# Patient Record
Sex: MALE | Race: WHITE | NOT HISPANIC OR LATINO
[De-identification: names, ages, dates, MRNs, and addresses within clinical notes are randomized per-mention and may not be internally consistent; named-entity substitution may affect disease eponyms.]

---

## 2017-01-11 ENCOUNTER — APPOINTMENT (OUTPATIENT)
Dept: BARIATRICS/WEIGHT MGMT | Facility: CLINIC | Age: 72
End: 2017-01-11

## 2017-01-11 VITALS
DIASTOLIC BLOOD PRESSURE: 70 MMHG | HEIGHT: 69 IN | SYSTOLIC BLOOD PRESSURE: 142 MMHG | BODY MASS INDEX: 33.51 KG/M2 | WEIGHT: 226.25 LBS

## 2017-01-11 VITALS — WEIGHT: 226.25 LBS | BODY MASS INDEX: 33.41 KG/M2

## 2017-01-11 RX ORDER — MECLIZINE HYDROCHLORIDE 25 MG/1
25 TABLET ORAL
Qty: 15 | Refills: 0 | Status: DISCONTINUED | COMMUNITY
Start: 2016-08-26

## 2017-01-11 RX ORDER — DEXAMETHASONE 4 MG/1
4 TABLET ORAL
Qty: 4 | Refills: 0 | Status: DISCONTINUED | COMMUNITY
Start: 2016-12-07

## 2017-01-11 RX ORDER — AMOXICILLIN 250 MG/1
250 CAPSULE ORAL
Qty: 40 | Refills: 0 | Status: DISCONTINUED | COMMUNITY
Start: 2016-12-07

## 2017-01-11 RX ORDER — CHLORHEXIDINE GLUCONATE, 0.12% ORAL RINSE 1.2 MG/ML
0.12 SOLUTION DENTAL
Qty: 473 | Refills: 0 | Status: DISCONTINUED | COMMUNITY
Start: 2016-12-07

## 2017-01-11 RX ORDER — COLCHICINE 0.6 MG/1
0.6 TABLET ORAL
Qty: 10 | Refills: 0 | Status: DISCONTINUED | COMMUNITY
Start: 2017-01-10

## 2017-01-11 RX ORDER — CLINDAMYCIN HYDROCHLORIDE 300 MG/1
300 CAPSULE ORAL
Qty: 14 | Refills: 0 | Status: DISCONTINUED | COMMUNITY
Start: 2016-12-12

## 2017-02-13 ENCOUNTER — APPOINTMENT (OUTPATIENT)
Dept: BARIATRICS/WEIGHT MGMT | Facility: CLINIC | Age: 72
End: 2017-02-13

## 2017-02-13 VITALS
HEIGHT: 69 IN | SYSTOLIC BLOOD PRESSURE: 117 MMHG | WEIGHT: 223.06 LBS | HEART RATE: 75 BPM | BODY MASS INDEX: 33.04 KG/M2 | DIASTOLIC BLOOD PRESSURE: 65 MMHG

## 2017-02-13 DIAGNOSIS — R26.9 UNSPECIFIED ABNORMALITIES OF GAIT AND MOBILITY: ICD-10-CM

## 2017-02-13 DIAGNOSIS — E55.9 VITAMIN D DEFICIENCY, UNSPECIFIED: ICD-10-CM

## 2017-04-02 LAB
25(OH)D3 SERPL-MCNC: 46.8 NG/ML
ALBUMIN SERPL ELPH-MCNC: 4.5 G/DL
ALP BLD-CCNC: 64 U/L
ALT SERPL-CCNC: 21 U/L
ANION GAP SERPL CALC-SCNC: 14 MMOL/L
AST SERPL-CCNC: 22 U/L
BASOPHILS # BLD AUTO: 0.02 K/UL
BASOPHILS NFR BLD AUTO: 0.3 %
BILIRUB SERPL-MCNC: 0.5 MG/DL
BUN SERPL-MCNC: 31 MG/DL
CALCIUM SERPL-MCNC: 9.9 MG/DL
CHLORIDE SERPL-SCNC: 104 MMOL/L
CHOLEST SERPL-MCNC: 131 MG/DL
CHOLEST/HDLC SERPL: 2.6 RATIO
CO2 SERPL-SCNC: 23 MMOL/L
CREAT SERPL-MCNC: 1.32 MG/DL
CRP SERPL HS-MCNC: 5.9 MG/L
EOSINOPHIL # BLD AUTO: 0.11 K/UL
EOSINOPHIL NFR BLD AUTO: 1.4 %
GLUCOSE SERPL-MCNC: 90 MG/DL
HBA1C MFR BLD HPLC: 5.6 %
HCT VFR BLD CALC: 39.6 %
HDLC SERPL-MCNC: 50 MG/DL
HGB BLD-MCNC: 12.8 G/DL
IMM GRANULOCYTES NFR BLD AUTO: 0.1 %
INSULIN SERPL-MCNC: 10.9 UU/ML
LDLC SERPL CALC-MCNC: 53 MG/DL
LYMPHOCYTES # BLD AUTO: 2.18 K/UL
LYMPHOCYTES NFR BLD AUTO: 28.7 %
MAN DIFF?: NORMAL
MCHC RBC-ENTMCNC: 28.8 PG
MCHC RBC-ENTMCNC: 32.3 GM/DL
MCV RBC AUTO: 89 FL
MONOCYTES # BLD AUTO: 0.71 K/UL
MONOCYTES NFR BLD AUTO: 9.4 %
NEUTROPHILS # BLD AUTO: 4.56 K/UL
NEUTROPHILS NFR BLD AUTO: 60.1 %
PLATELET # BLD AUTO: 200 K/UL
POTASSIUM SERPL-SCNC: 4.7 MMOL/L
PROT SERPL-MCNC: 7.3 G/DL
RBC # BLD: 4.45 M/UL
RBC # FLD: 13.7 %
SODIUM SERPL-SCNC: 141 MMOL/L
T3FREE SERPL-MCNC: 2.52 PG/ML
T4 FREE SERPL-MCNC: 1.1 NG/DL
TRIGL SERPL-MCNC: 140 MG/DL
TSH SERPL-ACNC: 3.39 UIU/ML
WBC # FLD AUTO: 7.59 K/UL

## 2017-04-10 ENCOUNTER — APPOINTMENT (OUTPATIENT)
Dept: BARIATRICS/WEIGHT MGMT | Facility: CLINIC | Age: 72
End: 2017-04-10

## 2017-04-10 VITALS
BODY MASS INDEX: 33.56 KG/M2 | SYSTOLIC BLOOD PRESSURE: 137 MMHG | WEIGHT: 226.6 LBS | DIASTOLIC BLOOD PRESSURE: 66 MMHG | HEIGHT: 69 IN | HEART RATE: 83 BPM

## 2017-05-22 ENCOUNTER — APPOINTMENT (OUTPATIENT)
Dept: BARIATRICS/WEIGHT MGMT | Facility: CLINIC | Age: 72
End: 2017-05-22

## 2017-05-22 VITALS
WEIGHT: 229 LBS | HEIGHT: 69 IN | HEART RATE: 92 BPM | SYSTOLIC BLOOD PRESSURE: 162 MMHG | DIASTOLIC BLOOD PRESSURE: 76 MMHG | BODY MASS INDEX: 33.92 KG/M2

## 2017-08-28 ENCOUNTER — APPOINTMENT (OUTPATIENT)
Dept: BARIATRICS/WEIGHT MGMT | Facility: CLINIC | Age: 72
End: 2017-08-28

## 2018-03-12 ENCOUNTER — APPOINTMENT (OUTPATIENT)
Dept: BARIATRICS/WEIGHT MGMT | Facility: CLINIC | Age: 73
End: 2018-03-12
Payer: MEDICARE

## 2018-03-12 VITALS — SYSTOLIC BLOOD PRESSURE: 164 MMHG | DIASTOLIC BLOOD PRESSURE: 84 MMHG

## 2018-03-12 VITALS
WEIGHT: 257 LBS | BODY MASS INDEX: 38.06 KG/M2 | OXYGEN SATURATION: 96 % | DIASTOLIC BLOOD PRESSURE: 78 MMHG | SYSTOLIC BLOOD PRESSURE: 156 MMHG | HEART RATE: 81 BPM | HEIGHT: 69 IN

## 2018-03-12 PROCEDURE — 99214 OFFICE O/P EST MOD 30 MIN: CPT

## 2018-03-12 RX ORDER — GABAPENTIN 100 MG/1
100 CAPSULE ORAL
Qty: 120 | Refills: 0 | Status: DISCONTINUED | COMMUNITY
Start: 2016-12-14 | End: 2018-03-12

## 2018-04-16 ENCOUNTER — APPOINTMENT (OUTPATIENT)
Dept: BARIATRICS/WEIGHT MGMT | Facility: CLINIC | Age: 73
End: 2018-04-16
Payer: MEDICARE

## 2018-04-16 VITALS
BODY MASS INDEX: 37.77 KG/M2 | DIASTOLIC BLOOD PRESSURE: 78 MMHG | SYSTOLIC BLOOD PRESSURE: 150 MMHG | WEIGHT: 255 LBS | HEIGHT: 69 IN

## 2018-04-16 PROCEDURE — 99214 OFFICE O/P EST MOD 30 MIN: CPT

## 2018-06-04 ENCOUNTER — APPOINTMENT (OUTPATIENT)
Dept: BARIATRICS/WEIGHT MGMT | Facility: CLINIC | Age: 73
End: 2018-06-04
Payer: MEDICARE

## 2018-06-04 VITALS
WEIGHT: 254 LBS | BODY MASS INDEX: 37.62 KG/M2 | HEIGHT: 69 IN | SYSTOLIC BLOOD PRESSURE: 146 MMHG | HEART RATE: 91 BPM | DIASTOLIC BLOOD PRESSURE: 88 MMHG | OXYGEN SATURATION: 96 %

## 2018-06-04 PROCEDURE — 99214 OFFICE O/P EST MOD 30 MIN: CPT

## 2018-07-09 ENCOUNTER — APPOINTMENT (OUTPATIENT)
Dept: BARIATRICS/WEIGHT MGMT | Facility: CLINIC | Age: 73
End: 2018-07-09
Payer: MEDICARE

## 2018-07-09 VITALS
SYSTOLIC BLOOD PRESSURE: 130 MMHG | DIASTOLIC BLOOD PRESSURE: 80 MMHG | WEIGHT: 247 LBS | BODY MASS INDEX: 36.58 KG/M2 | HEIGHT: 69 IN

## 2018-07-09 PROCEDURE — 99214 OFFICE O/P EST MOD 30 MIN: CPT

## 2018-07-09 RX ORDER — MULTIVITAMIN,THERAPEUTIC
TABLET ORAL
Refills: 0 | Status: ACTIVE | COMMUNITY

## 2018-08-20 ENCOUNTER — APPOINTMENT (OUTPATIENT)
Dept: BARIATRICS/WEIGHT MGMT | Facility: CLINIC | Age: 73
End: 2018-08-20
Payer: MEDICARE

## 2018-08-20 VITALS
DIASTOLIC BLOOD PRESSURE: 78 MMHG | BODY MASS INDEX: 37.38 KG/M2 | SYSTOLIC BLOOD PRESSURE: 148 MMHG | HEIGHT: 69 IN | WEIGHT: 252.4 LBS | HEART RATE: 95 BPM | OXYGEN SATURATION: 96 %

## 2018-08-20 PROCEDURE — 99214 OFFICE O/P EST MOD 30 MIN: CPT

## 2018-09-17 ENCOUNTER — APPOINTMENT (OUTPATIENT)
Dept: BARIATRICS/WEIGHT MGMT | Facility: CLINIC | Age: 73
End: 2018-09-17
Payer: MEDICARE

## 2018-09-17 VITALS
HEART RATE: 81 BPM | BODY MASS INDEX: 36.79 KG/M2 | WEIGHT: 248.38 LBS | OXYGEN SATURATION: 97 % | HEIGHT: 69 IN | DIASTOLIC BLOOD PRESSURE: 80 MMHG | SYSTOLIC BLOOD PRESSURE: 150 MMHG

## 2018-09-17 PROCEDURE — 99214 OFFICE O/P EST MOD 30 MIN: CPT

## 2018-10-17 ENCOUNTER — APPOINTMENT (OUTPATIENT)
Dept: BARIATRICS/WEIGHT MGMT | Facility: CLINIC | Age: 73
End: 2018-10-17

## 2018-11-19 ENCOUNTER — APPOINTMENT (OUTPATIENT)
Dept: BARIATRICS/WEIGHT MGMT | Facility: CLINIC | Age: 73
End: 2018-11-19
Payer: MEDICARE

## 2018-11-19 VITALS
HEART RATE: 102 BPM | DIASTOLIC BLOOD PRESSURE: 86 MMHG | BODY MASS INDEX: 37.89 KG/M2 | OXYGEN SATURATION: 97 % | HEIGHT: 69 IN | SYSTOLIC BLOOD PRESSURE: 156 MMHG | WEIGHT: 255.8 LBS

## 2018-11-19 PROCEDURE — 99214 OFFICE O/P EST MOD 30 MIN: CPT

## 2018-11-26 ENCOUNTER — APPOINTMENT (OUTPATIENT)
Dept: ORTHOPEDIC SURGERY | Facility: CLINIC | Age: 73
End: 2018-11-26
Payer: MEDICARE

## 2018-11-26 VITALS
DIASTOLIC BLOOD PRESSURE: 91 MMHG | HEART RATE: 76 BPM | SYSTOLIC BLOOD PRESSURE: 185 MMHG | WEIGHT: 255 LBS | BODY MASS INDEX: 37.77 KG/M2 | HEIGHT: 69 IN

## 2018-11-26 DIAGNOSIS — Z78.9 OTHER SPECIFIED HEALTH STATUS: ICD-10-CM

## 2018-11-26 DIAGNOSIS — M47.814 SPONDYLOSIS W/OUT MYELOPATHY OR RADICULOPATHY, THORACIC REGION: ICD-10-CM

## 2018-11-26 PROCEDURE — 72070 X-RAY EXAM THORAC SPINE 2VWS: CPT

## 2018-11-26 PROCEDURE — 72100 X-RAY EXAM L-S SPINE 2/3 VWS: CPT

## 2018-11-26 PROCEDURE — 99204 OFFICE O/P NEW MOD 45 MIN: CPT

## 2018-11-26 RX ORDER — CHOLECALCIFEROL (VITAMIN D3) 25 MCG
TABLET ORAL
Refills: 0 | Status: ACTIVE | COMMUNITY

## 2019-01-14 ENCOUNTER — APPOINTMENT (OUTPATIENT)
Dept: BARIATRICS/WEIGHT MGMT | Facility: CLINIC | Age: 74
End: 2019-01-14
Payer: MEDICARE

## 2019-01-14 VITALS
WEIGHT: 256 LBS | BODY MASS INDEX: 37.92 KG/M2 | DIASTOLIC BLOOD PRESSURE: 64 MMHG | SYSTOLIC BLOOD PRESSURE: 130 MMHG | HEIGHT: 69 IN

## 2019-01-14 PROCEDURE — 99214 OFFICE O/P EST MOD 30 MIN: CPT

## 2019-01-14 NOTE — ASSESSMENT
[FreeTextEntry1] : 72 yo M presents for weight management f/u.  No weight loss over numerous visits.  Pt is a candidate for pharmacotherapy.\par \par Plan:\par Initiate GLP-1 agonist pending insurance coverage\par Avoid all after dinner snacking\par Activity as tolerated having chronic pain.\par Discussed need for pt to be mindful of meal choices during upcoming vacations\par Will need to discuss other options for sleep apnea management as pt prefers to avoid CPAP at this time\par \par RTC 4wks

## 2019-01-14 NOTE — PHYSICAL EXAM
[Obese] : obese [Normal] : RRR, normal S1S2, no murmurs, rubs, gallops [de-identified] : large neck circumference [de-identified] : central adiposity

## 2019-01-14 NOTE — HISTORY OF PRESENT ILLNESS
[FreeTextEntry1] : 72 yo M presents for weight management f/u.\par \par Weight up an additional pound from his last visit, 33lb from his lowest weight.\par \par Pt w/ inconsistencies sticking w/ meal plan.  He normally has eggs w/ coffee for breakfast, skips lunch and fish w/ soup for dinner.  He's attempting to limit bread, occasional pretzels as an after dinner snack.  12 hr meal window.\par No activity due to pain.  He attempted topical CBD which provided some relief for walking.\par Pt has sleep apnea, refusing CPAP.  Gets about 8hr sleep nightly.

## 2019-02-20 ENCOUNTER — APPOINTMENT (OUTPATIENT)
Dept: BARIATRICS/WEIGHT MGMT | Facility: CLINIC | Age: 74
End: 2019-02-20
Payer: MEDICARE

## 2019-02-20 VITALS
SYSTOLIC BLOOD PRESSURE: 120 MMHG | OXYGEN SATURATION: 94 % | HEIGHT: 69 IN | WEIGHT: 259 LBS | DIASTOLIC BLOOD PRESSURE: 80 MMHG | HEART RATE: 82 BPM | BODY MASS INDEX: 38.36 KG/M2

## 2019-02-20 PROCEDURE — 99214 OFFICE O/P EST MOD 30 MIN: CPT

## 2019-02-20 RX ORDER — TRAMADOL HYDROCHLORIDE 50 MG/1
50 TABLET, COATED ORAL
Refills: 0 | Status: COMPLETED | COMMUNITY
End: 2019-02-20

## 2019-02-20 RX ORDER — PREDNISONE 10 MG/1
10 TABLET ORAL
Qty: 39 | Refills: 0 | Status: COMPLETED | COMMUNITY
Start: 2018-11-26 | End: 2019-02-20

## 2019-02-20 NOTE — REASON FOR VISIT
[Follow-Up Visit] : a follow-up visit for [Obesity] : obesity [Metabolic Syndrome] : metabolic syndrome

## 2019-02-23 ENCOUNTER — RX RENEWAL (OUTPATIENT)
Age: 74
End: 2019-02-23

## 2019-02-25 ENCOUNTER — APPOINTMENT (OUTPATIENT)
Dept: INFECTIOUS DISEASE | Facility: CLINIC | Age: 74
End: 2019-02-25

## 2019-02-26 NOTE — HISTORY OF PRESENT ILLNESS
[FreeTextEntry1] : 74 yo M presents for weight management f/u.\par \par 3lb increase since last visit, up 36lb from his lowest weight.\par Recently returned from vacation, scheduled for another vacation in 2wks.  He ate more meals, pasta and higher fat meals when away.\par Unable to contact pt to start Trulicity, will discuss today.\par No activity due to pain.  He attempted topical CBD which provided some relief for walking.\par Pt has sleep apnea, refusing CPAP.

## 2019-02-26 NOTE — ASSESSMENT
[FreeTextEntry1] : 72 yo M presents for weight management f/u.  No weight loss over numerous visits.  Pt is a candidate for pharmacotherapy.\par \par Plan:\par Will start Trulicity.  Discussed side effects and storage requirements.\par Discussed need for pt to be mindful of meal choices during upcoming vacations\par Activity as tolerated having chronic pain.\par Will need to discuss other options for sleep apnea management as pt prefers to avoid CPAP\par \par RTC 4wks

## 2019-02-26 NOTE — PHYSICAL EXAM
[Obese] : obese [Normal] : RRR, normal S1S2, no murmurs, rubs, gallops [de-identified] : large neck circumference [de-identified] : central adiposity

## 2019-03-07 ENCOUNTER — MEDICATION RENEWAL (OUTPATIENT)
Age: 74
End: 2019-03-07

## 2019-04-10 ENCOUNTER — APPOINTMENT (OUTPATIENT)
Dept: BARIATRICS/WEIGHT MGMT | Facility: CLINIC | Age: 74
End: 2019-04-10
Payer: MEDICARE

## 2019-04-10 VITALS
SYSTOLIC BLOOD PRESSURE: 170 MMHG | BODY MASS INDEX: 37.59 KG/M2 | WEIGHT: 248 LBS | DIASTOLIC BLOOD PRESSURE: 80 MMHG | HEART RATE: 90 BPM | HEIGHT: 68 IN | OXYGEN SATURATION: 95 %

## 2019-04-10 PROCEDURE — 99214 OFFICE O/P EST MOD 30 MIN: CPT

## 2019-04-19 DIAGNOSIS — Z87.09 PERSONAL HISTORY OF OTHER DISEASES OF THE RESPIRATORY SYSTEM: ICD-10-CM

## 2019-04-19 NOTE — HISTORY OF PRESENT ILLNESS
[FreeTextEntry1] : 74 yo M presents for weight management f/u.\par Pt w/ 11lb weight loss since last visit.\par \par Pt tolerating Trulicity 0.75mg denying reflux.  Slight decrease in appetite.\par Pt also attempts to eat a small snack like pretzels prior to meals to decrease appetite \par He also did more walking as tolerated when away on vacation, limited by pain.\par Pt has sleep apnea, refusing CPAP.

## 2019-04-19 NOTE — ASSESSMENT
[FreeTextEntry1] : 74 yo M presents for weight management f/u.\par Pt w/ 11lb weight loss since last visit.\par \par Plan:\par Suggest pt focus on plant based options high in fiber options for meals avoiding refined carbohydrates for snacks ie pretzels\par Encouraged low impact activities ie stationary bike, water aerobics\par Increase dose of Trulicity 0.75-1.5mg\par \par \par RTC 4wks

## 2019-04-19 NOTE — PHYSICAL EXAM
[Obese] : obese [Normal] : affect appropriate [de-identified] : central adiposity [de-identified] : walks w/ limp, chronic [de-identified] : large neck circumference

## 2019-04-29 ENCOUNTER — FORM ENCOUNTER (OUTPATIENT)
Age: 74
End: 2019-04-29

## 2019-04-30 ENCOUNTER — APPOINTMENT (OUTPATIENT)
Dept: FAMILY MEDICINE | Facility: CLINIC | Age: 74
End: 2019-04-30
Payer: MEDICARE

## 2019-04-30 ENCOUNTER — OUTPATIENT (OUTPATIENT)
Dept: OUTPATIENT SERVICES | Facility: HOSPITAL | Age: 74
LOS: 1 days | End: 2019-04-30
Payer: MEDICARE

## 2019-04-30 ENCOUNTER — APPOINTMENT (OUTPATIENT)
Dept: RADIOLOGY | Facility: HOSPITAL | Age: 74
End: 2019-04-30

## 2019-04-30 VITALS
OXYGEN SATURATION: 95 % | DIASTOLIC BLOOD PRESSURE: 78 MMHG | HEIGHT: 68 IN | RESPIRATION RATE: 14 BRPM | TEMPERATURE: 98.4 F | HEART RATE: 97 BPM | SYSTOLIC BLOOD PRESSURE: 142 MMHG | BODY MASS INDEX: 38.8 KG/M2 | WEIGHT: 256 LBS

## 2019-04-30 DIAGNOSIS — R05 COUGH: ICD-10-CM

## 2019-04-30 DIAGNOSIS — M48.00 SPINAL STENOSIS, SITE UNSPECIFIED: ICD-10-CM

## 2019-04-30 PROCEDURE — 71046 X-RAY EXAM CHEST 2 VIEWS: CPT | Mod: 26

## 2019-04-30 PROCEDURE — 71046 X-RAY EXAM CHEST 2 VIEWS: CPT

## 2019-04-30 PROCEDURE — 99203 OFFICE O/P NEW LOW 30 MIN: CPT

## 2019-04-30 NOTE — HEALTH RISK ASSESSMENT
[] : Yes [No falls in past year] : Patient reported no falls in the past year [0] : 2) Feeling down, depressed, or hopeless: Not at all (0) [de-identified] : social

## 2019-04-30 NOTE — PHYSICAL EXAM
[No Acute Distress] : no acute distress [Well Nourished] : well nourished [Normal Sclera/Conjunctiva] : normal sclera/conjunctiva [Normal Outer Ear/Nose] : the outer ears and nose were normal in appearance [Normal Oropharynx] : the oropharynx was normal [No JVD] : no jugular venous distention [Supple] : supple [No Lymphadenopathy] : no lymphadenopathy [Thyroid Normal, No Nodules] : the thyroid was normal and there were no nodules present [No Respiratory Distress] : no respiratory distress  [Clear to Auscultation] : lungs were clear to auscultation bilaterally [No Accessory Muscle Use] : no accessory muscle use [Normal Rate] : normal rate  [Regular Rhythm] : with a regular rhythm [Normal S1, S2] : normal S1 and S2 [No Murmur] : no murmur heard [No Edema] : there was no peripheral edema [Soft] : abdomen soft [Non Tender] : non-tender [Non-distended] : non-distended [No HSM] : no HSM [Normal Bowel Sounds] : normal bowel sounds [Normal Supraclavicular Nodes] : no supraclavicular lymphadenopathy [Normal Posterior Cervical Nodes] : no posterior cervical lymphadenopathy [Normal Anterior Cervical Nodes] : no anterior cervical lymphadenopathy [No CVA Tenderness] : no CVA  tenderness [de-identified] : morbid obesity

## 2019-04-30 NOTE — REVIEW OF SYSTEMS
[Fever] : no fever [Chills] : no chills [Night Sweats] : no night sweats [Shortness Of Breath] : no shortness of breath [Wheezing] : no wheezing [Cough] : cough [Dyspnea on Exertion] : not dyspnea on exertion [Joint Pain] : joint pain [Joint Stiffness] : joint stiffness [Back Pain] : back pain [Negative] : Genitourinary

## 2019-05-01 RX ORDER — CELECOXIB 50 MG/1
CAPSULE ORAL
Refills: 0 | Status: DISCONTINUED | COMMUNITY
End: 2019-05-01

## 2019-05-01 RX ORDER — DULAGLUTIDE 0.75 MG/.5ML
0.75 INJECTION, SOLUTION SUBCUTANEOUS
Qty: 4 | Refills: 3 | Status: DISCONTINUED | COMMUNITY
Start: 2019-02-20 | End: 2019-05-01

## 2019-05-01 RX ORDER — TRAMADOL HYDROCHLORIDE 50 MG/1
50 TABLET, COATED ORAL
Qty: 30 | Refills: 0 | Status: DISCONTINUED | COMMUNITY
Start: 2018-11-09 | End: 2019-05-01

## 2019-05-01 RX ORDER — POTASSIUM CHLORIDE 10 MEQ
CAPSULE, EXTENDED RELEASE ORAL
Refills: 0 | Status: DISCONTINUED | COMMUNITY
End: 2019-05-01

## 2019-05-01 RX ORDER — CELECOXIB 200 MG/1
200 CAPSULE ORAL TWICE DAILY
Qty: 60 | Refills: 2 | Status: DISCONTINUED | COMMUNITY
Start: 2018-11-26 | End: 2019-05-01

## 2019-05-22 ENCOUNTER — APPOINTMENT (OUTPATIENT)
Dept: BARIATRICS/WEIGHT MGMT | Facility: CLINIC | Age: 74
End: 2019-05-22
Payer: MEDICARE

## 2019-05-22 VITALS
OXYGEN SATURATION: 96 % | HEART RATE: 94 BPM | HEIGHT: 68 IN | DIASTOLIC BLOOD PRESSURE: 80 MMHG | WEIGHT: 246 LBS | BODY MASS INDEX: 37.28 KG/M2 | SYSTOLIC BLOOD PRESSURE: 162 MMHG

## 2019-05-22 PROCEDURE — 99214 OFFICE O/P EST MOD 30 MIN: CPT

## 2019-05-23 ENCOUNTER — EMERGENCY (EMERGENCY)
Facility: HOSPITAL | Age: 74
LOS: 0 days | Discharge: HOME | End: 2019-05-23
Attending: EMERGENCY MEDICINE | Admitting: EMERGENCY MEDICINE
Payer: MEDICARE

## 2019-05-23 VITALS
TEMPERATURE: 98 F | RESPIRATION RATE: 18 BRPM | DIASTOLIC BLOOD PRESSURE: 66 MMHG | HEART RATE: 75 BPM | SYSTOLIC BLOOD PRESSURE: 156 MMHG | OXYGEN SATURATION: 96 %

## 2019-05-23 VITALS — HEIGHT: 68 IN | WEIGHT: 246.04 LBS

## 2019-05-23 DIAGNOSIS — J02.9 ACUTE PHARYNGITIS, UNSPECIFIED: ICD-10-CM

## 2019-05-23 DIAGNOSIS — R05 COUGH: ICD-10-CM

## 2019-05-23 DIAGNOSIS — Z87.442 PERSONAL HISTORY OF URINARY CALCULI: ICD-10-CM

## 2019-05-23 DIAGNOSIS — E78.5 HYPERLIPIDEMIA, UNSPECIFIED: ICD-10-CM

## 2019-05-23 DIAGNOSIS — J98.11 ATELECTASIS: ICD-10-CM

## 2019-05-23 DIAGNOSIS — I10 ESSENTIAL (PRIMARY) HYPERTENSION: ICD-10-CM

## 2019-05-23 DIAGNOSIS — R61 GENERALIZED HYPERHIDROSIS: ICD-10-CM

## 2019-05-23 DIAGNOSIS — Z85.51 PERSONAL HISTORY OF MALIGNANT NEOPLASM OF BLADDER: ICD-10-CM

## 2019-05-23 DIAGNOSIS — R06.02 SHORTNESS OF BREATH: ICD-10-CM

## 2019-05-23 LAB
ALBUMIN SERPL ELPH-MCNC: 3.6 G/DL — SIGNIFICANT CHANGE UP (ref 3.3–5)
ALP SERPL-CCNC: 67 U/L — SIGNIFICANT CHANGE UP (ref 40–120)
ALT FLD-CCNC: 29 U/L — SIGNIFICANT CHANGE UP (ref 12–78)
ANION GAP SERPL CALC-SCNC: 7 MMOL/L — SIGNIFICANT CHANGE UP (ref 5–17)
APTT BLD: 29.1 SEC — SIGNIFICANT CHANGE UP (ref 27.5–36.3)
AST SERPL-CCNC: 21 U/L — SIGNIFICANT CHANGE UP (ref 15–37)
BASOPHILS # BLD AUTO: 0.02 K/UL — SIGNIFICANT CHANGE UP (ref 0–0.2)
BASOPHILS NFR BLD AUTO: 0.3 % — SIGNIFICANT CHANGE UP (ref 0–2)
BILIRUB SERPL-MCNC: 0.4 MG/DL — SIGNIFICANT CHANGE UP (ref 0.2–1.2)
BLD GP AB SCN SERPL QL: SIGNIFICANT CHANGE UP
BUN SERPL-MCNC: 20 MG/DL — SIGNIFICANT CHANGE UP (ref 7–23)
CALCIUM SERPL-MCNC: 9.2 MG/DL — SIGNIFICANT CHANGE UP (ref 8.5–10.1)
CHLORIDE SERPL-SCNC: 109 MMOL/L — HIGH (ref 96–108)
CK SERPL-CCNC: 158 U/L — SIGNIFICANT CHANGE UP (ref 26–308)
CO2 SERPL-SCNC: 25 MMOL/L — SIGNIFICANT CHANGE UP (ref 22–31)
CREAT SERPL-MCNC: 1.35 MG/DL — HIGH (ref 0.5–1.3)
EOSINOPHIL # BLD AUTO: 0.24 K/UL — SIGNIFICANT CHANGE UP (ref 0–0.5)
EOSINOPHIL NFR BLD AUTO: 3.6 % — SIGNIFICANT CHANGE UP (ref 0–6)
GLUCOSE SERPL-MCNC: 86 MG/DL — SIGNIFICANT CHANGE UP (ref 70–99)
HCT VFR BLD CALC: 37.3 % — LOW (ref 39–50)
HGB BLD-MCNC: 12.1 G/DL — LOW (ref 13–17)
IMM GRANULOCYTES NFR BLD AUTO: 0.3 % — SIGNIFICANT CHANGE UP (ref 0–1.5)
INR BLD: 0.99 RATIO — SIGNIFICANT CHANGE UP (ref 0.88–1.16)
LACTATE SERPL-SCNC: 1.3 MMOL/L — SIGNIFICANT CHANGE UP (ref 0.7–2)
LYMPHOCYTES # BLD AUTO: 1.59 K/UL — SIGNIFICANT CHANGE UP (ref 1–3.3)
LYMPHOCYTES # BLD AUTO: 23.9 % — SIGNIFICANT CHANGE UP (ref 13–44)
MAGNESIUM SERPL-MCNC: 2.2 MG/DL — SIGNIFICANT CHANGE UP (ref 1.6–2.6)
MCHC RBC-ENTMCNC: 29.5 PG — SIGNIFICANT CHANGE UP (ref 27–34)
MCHC RBC-ENTMCNC: 32.4 GM/DL — SIGNIFICANT CHANGE UP (ref 32–36)
MCV RBC AUTO: 91 FL — SIGNIFICANT CHANGE UP (ref 80–100)
MONOCYTES # BLD AUTO: 0.86 K/UL — SIGNIFICANT CHANGE UP (ref 0–0.9)
MONOCYTES NFR BLD AUTO: 12.9 % — SIGNIFICANT CHANGE UP (ref 2–14)
NEUTROPHILS # BLD AUTO: 3.92 K/UL — SIGNIFICANT CHANGE UP (ref 1.8–7.4)
NEUTROPHILS NFR BLD AUTO: 59 % — SIGNIFICANT CHANGE UP (ref 43–77)
NT-PROBNP SERPL-SCNC: 340 PG/ML — HIGH (ref 0–125)
PLATELET # BLD AUTO: 168 K/UL — SIGNIFICANT CHANGE UP (ref 150–400)
POTASSIUM SERPL-MCNC: 4.4 MMOL/L — SIGNIFICANT CHANGE UP (ref 3.5–5.3)
POTASSIUM SERPL-SCNC: 4.4 MMOL/L — SIGNIFICANT CHANGE UP (ref 3.5–5.3)
PROT SERPL-MCNC: 7.5 GM/DL — SIGNIFICANT CHANGE UP (ref 6–8.3)
PROTHROM AB SERPL-ACNC: 11 SEC — SIGNIFICANT CHANGE UP (ref 10–12.9)
RBC # BLD: 4.1 M/UL — LOW (ref 4.2–5.8)
RBC # FLD: 14.5 % — SIGNIFICANT CHANGE UP (ref 10.3–14.5)
SODIUM SERPL-SCNC: 141 MMOL/L — SIGNIFICANT CHANGE UP (ref 135–145)
TROPONIN I SERPL-MCNC: <0.015 NG/ML — SIGNIFICANT CHANGE UP (ref 0.01–0.04)
TYPE + AB SCN PNL BLD: SIGNIFICANT CHANGE UP
WBC # BLD: 6.65 K/UL — SIGNIFICANT CHANGE UP (ref 3.8–10.5)
WBC # FLD AUTO: 6.65 K/UL — SIGNIFICANT CHANGE UP (ref 3.8–10.5)

## 2019-05-23 PROCEDURE — 71275 CT ANGIOGRAPHY CHEST: CPT | Mod: 26

## 2019-05-23 PROCEDURE — 71045 X-RAY EXAM CHEST 1 VIEW: CPT | Mod: 26

## 2019-05-23 PROCEDURE — 99285 EMERGENCY DEPT VISIT HI MDM: CPT | Mod: 25

## 2019-05-23 PROCEDURE — 93010 ELECTROCARDIOGRAM REPORT: CPT

## 2019-05-23 NOTE — ED PROVIDER NOTE - OBJECTIVE STATEMENT
74 y/o male with a PMHx of HTN, HLD on Lipitor, kidney stones, Bladder CA in remission for the past 5-6 years, Neuropathy s/p past back surgery presents to the ED sent in by Dr. Murillo for r/o PNA c/o intermittent cough, sore throat for the past weeks. 8 weeks ago pt visit [place] and [place]. Pt states for the past few weeks he has had a cough. States when he coughs he has SOB. Cough originating from the chest. States 2 days ago his cough worsened, sometimes coughs for an hour straight. Now pt has SOB at rest. Cough nonproductive. +diaphoresis, when having a "coughing attack". Stress reportedly normal, done last week. Denies fever, chest pain, LE edema, loss of appetite. NKDA.

## 2019-05-23 NOTE — ED PROVIDER NOTE - PROGRESS NOTE DETAILS
Dr. Mazariegos:  Case d/w pt's MD friend who referred him to ED, Dr. Mariano, incl. CXR & labs results.  CTA chest pending. Dr. Mazariegos:  CTA chest negative.  Reevaluated patient at bedside.  Patient feeling well, no SOB.  /66, R/A P.O. 98%.   Discussed the results of all diagnostic testing in ED and copies of all reports given.   An opportunity to ask questions was given.  Discussed the importance of prompt, close medical follow-up.  Patient will return with any changes, concerns or persistent / worsening symptoms.  Understanding of all instructions verbalized.

## 2019-05-23 NOTE — ED PROVIDER NOTE - NSFOLLOWUPINSTRUCTIONS_ED_ALL_ED_FT
Rest.  Continue your regular medications as per routine.  Take medication as prescribed.  Follow up with your own doctor: call office tomorrow.      ED evaluation and management discussed with the patient and family (if available) in detail.  Close PMD follow up encouraged.  Strict ED return instructions discussed in detail and patient given the opportunity to ask any questions about their discharge diagnosis and instructions. Patient verbalized understanding.      Cough    Coughing is a reflex that clears your throat and your airways. Coughing helps to heal and protect your lungs. It is normal to cough occasionally, but a cough that happens with other symptoms or lasts a long time may be a sign of a condition that needs treatment. Coughing may be caused by infections, asthma or COPD, smoking, postnasal drip, gastroesophageal reflux, as well as other medical conditions. Take medicines only as instructed by your health care provider. Avoid environments or triggers that causes you to cough at work or at home.    SEEK IMMEDIATE MEDICAL CARE IF YOU HAVE ANY OF THE FOLLOWING SYMPTOMS: coughing up blood, shortness of breath, rapid heart rate, chest pain, unexplained weight loss or night sweats. Rest.  Continue your regular medications as per routine.  Take medication as prescribed.  Follow up with your own doctor: call office tomorrow.  10 deep breaths, 10 cycles per day for next 1 week.      ED evaluation and management discussed with the patient and family (if available) in detail.  Close PMD follow up encouraged.  Strict ED return instructions discussed in detail and patient given the opportunity to ask any questions about their discharge diagnosis and instructions. Patient verbalized understanding.      Cough    Coughing is a reflex that clears your throat and your airways. Coughing helps to heal and protect your lungs. It is normal to cough occasionally, but a cough that happens with other symptoms or lasts a long time may be a sign of a condition that needs treatment. Coughing may be caused by infections, asthma or COPD, smoking, postnasal drip, gastroesophageal reflux, as well as other medical conditions. Take medicines only as instructed by your health care provider. Avoid environments or triggers that causes you to cough at work or at home.    SEEK IMMEDIATE MEDICAL CARE IF YOU HAVE ANY OF THE FOLLOWING SYMPTOMS: coughing up blood, shortness of breath, rapid heart rate, chest pain, unexplained weight loss or night sweats.        Atelectasis, Adult  Image   Atelectasis is a collapse of air sacs in the lungs (alveoli). The condition causes all or part of a lung to collapse. Atelectasis is a common problem after surgery. Its severity depends on the size of lung tissue area involved and the underlying cause. When severe, it can lead to shortness of breath and heart problems.    Atelectasis can develop suddenly or over a long period of time. Atelectasis that develops over a long period of time (chronic atelectasis) often leads to infection, scarring, and other problems.    What are the causes?  This condition may be caused by:  Shallow breathing.  Medicines that make breathing more shallow.  A blockage in an airway. Blockages can result from:  A buildup of mucus.  A tumor.  An inhaled object (foreign body).  Enlarged lymph nodes.  Fluid in the lungs (pleural effusion).  A blood clot in the lungs.  Outside pressure on the lung. Pressure can be due to:  A tumor.  Fluid in the lungs (pleural effusion).  Air leaking between the lung and rib cage (pneumothorax).  Enlarged lymph nodes.  Improper expansion of the lungs. This may occur in newborns because of:  Prematurity.  Low oxygen levels.  Secretions at birth that block the airway.  Amniotic fluid that goes into the lungs (aspiration).  What increases the risk?  This condition is more likely to develop in people who:  Have an injury or health problem that makes taking deep breaths difficult or painful.  Have certain infections or diseases, such as pneumonia or cystic fibrosis.  Have had surgery on the chest or abdomen.  Have broken ribs.  Have a tight bandage around their chest.  Have a collapsed lung due to pneumothorax.  Take medicines that decrease the rate of their breathing or how deeply they breathe, like sedatives.  Lie flat for long periods of time.  What are the signs or symptoms?  Often, there are no symptoms for this condition. When symptoms do appear, they may include:  Shortness of breath.  Bluish color to the nails, lips, or mouth (cyanosis).  A cough.  How is this diagnosed?  This condition may be diagnosed based on:  Symptoms.  A physical exam.  A chest X-ray.  Sometimes specialized imaging tests are needed to diagnose the condition.    How is this treated?  Treatment for this condition depends on what caused the condition. Treatment may involve:  Coughing. Coughing helps loosen mucus in the airway.  Chest physiotherapy. This is a treatment to help loosen and clear mucus from the airways. It is done by clapping the chest.  Postural drainage techniques. This treatment involves positioning your body so your head is lower than your chest. It helps mucus drain from your airways.  An incentive spirometer. This is a device that is used to help with taking deeper breaths.  Positive pressure breathing. This is a form of breathing assistance in which air is forced into the lungs when you breathe in (inhale). You may have this treatment if your condition is severe.  Treatment of the underlying condition.  Follow these instructions at home:  Take over-the-counter and prescription medicines only as told by your health care provider.  Practice taking relaxed and deep breaths when you are sitting. A good time to practice is when you are watching TV. Take a few deep breaths during each commercial break.  Make sure to lie on your unaffected side when you are lying down. For example, if you have atelectasis in your left lung, lie on your right side. This will help mucus drain from your airway.  Cough several times a day as told by your health care provider.  Perform chest physiotherapy or postural drainage techniques as told by your health care provider. If necessary, have someone help you.  If you were given a device to help with breathing, use it as told by your health care provider.  Stay as active as possible.  Get help right away if:  Your breathing problems get worse.  You have severe chest pain.  You develop severe coughing.  You cough up blood.  You have a fever.  You have persistent symptoms for more than 2–3 days.  Your symptoms suddenly get worse.  This information is not intended to replace advice given to you by your health care provider. Make sure you discuss any questions you have with your health care provider.

## 2019-05-23 NOTE — ED STATDOCS - PROGRESS NOTE DETAILS
[place] [facility] for attending Dr. Suárez: 72 y/o male with a PMHx of presents to the ED sent in by Dr Murillo for r/o PNA. pt c/o cough x 2days, slight tightness in head. no respiratory distress or SOB. Pt finished Z-pack about 3-4 days ago as well. pt visited [place] and [place] 6wks, denies being sick there. Denies LE edema.  Dr. Murillo called ahead and would like to be called back when results are in. Will send pt to main ED for further evaluation.

## 2019-05-23 NOTE — ED ADULT NURSE NOTE - NSIMPLEMENTINTERV_GEN_ALL_ED
Implemented All Universal Safety Interventions:  [place] to call system. Call bell, personal items and telephone within reach. Instruct patient to call for assistance. Room bathroom lighting operational. Non-slip footwear when patient is off stretcher. Physically safe environment: no spills, clutter or unnecessary equipment. Stretcher in lowest position, wheels locked, appropriate side rails in place.

## 2019-05-23 NOTE — ED PROVIDER NOTE - CARE PLAN
Principal Discharge DX:	Cough Principal Discharge DX:	Cough  Secondary Diagnosis:	Atelectasis pulmonary

## 2019-05-23 NOTE — ED ADULT NURSE NOTE - OBJECTIVE STATEMENT
pt presents to ED with complaints of cough and SOB x 2 days. pt states he started to feel ill and went to PMD who sent him to r/o pneumonia. denies chest pain. 20 g placed to right AC. labs sent. EKG performed. Tele monitoring initiated.

## 2019-05-23 NOTE — ED PROVIDER NOTE - CONSTITUTIONAL, MLM
normal... obese white male, alert, frequent dry cough, no sentence shortening, not in respiratory distress, nontoxic.

## 2019-05-23 NOTE — ED PROVIDER NOTE - ENMT, MLM
Airway patent, Nasal mucosa clear. MM moist. Throat has no vesicles, no oropharyngeal exudates and uvula is midline.

## 2019-05-23 NOTE — ED ADULT TRIAGE NOTE - CHIEF COMPLAINT QUOTE
pt sent in by Dr Murillo for r/o PNA. pt c/o cough x 2days, slight tightness in head. no resp distress or sob. pt visited [place] and [place] 6wks, denies being sick there.

## 2019-05-23 NOTE — ED PROVIDER NOTE - CLINICAL SUMMARY MEDICAL DECISION MAKING FREE TEXT BOX
72 y/o male with PMHx of HTN, HLD, +obesity, 8 weeks plane trip to [place] ambulatory to ED c/o recent worsening of 2 weeks cough/SOB. No relief by PO [place]. Pt referred to ED by PMD for r/o PNA.   Plan: EKG, CXR, labs including blood cultures, serum lactate, BNP, serial troponin, coags, IV, monitor, observe reassess.  CXR GIRISH, WBC, BNP, normal. Ordering CT angio chest for r/o PE given recent plane trip.

## 2019-05-23 NOTE — ED PROVIDER NOTE - MUSCULOSKELETAL, MLM
Spine appears normal, range of motion is not limited, no muscle or joint tenderness. Chest wall nontender stable. MAEx4. no focal swelling tenderness.

## 2019-05-28 NOTE — HISTORY OF PRESENT ILLNESS
[FreeTextEntry1] : This is a 73 year old male present in office today for weight loss management \par \par Patient lost 2 pounds since last visit. He continues Trulicity 1.5mg, tolerating well. Reports improvement in appetite with medication. He typically only eats 1 meal, which is dinner. He and his wife eat out often, although he eats less in one sitting. Most of his foods are high fat, refined carbohydrates. \par He is unable to exercise due to chronic pain. His pool opens next week, and he plans on doing aerobics in the pool

## 2019-05-28 NOTE — ASSESSMENT
[FreeTextEntry1] : Plan: \par \par Discussed improving quality of food. Ideally eat earlier in the day, limit the amount of oil/fat in his food\par Continue Trulicity 1.5mg \par Start swimming \par \par RTO 3-4 weeks

## 2019-05-29 LAB
CULTURE RESULTS: SIGNIFICANT CHANGE UP
CULTURE RESULTS: SIGNIFICANT CHANGE UP
SPECIMEN SOURCE: SIGNIFICANT CHANGE UP
SPECIMEN SOURCE: SIGNIFICANT CHANGE UP

## 2019-09-04 ENCOUNTER — APPOINTMENT (OUTPATIENT)
Dept: FAMILY MEDICINE | Facility: CLINIC | Age: 74
End: 2019-09-04
Payer: MEDICARE

## 2019-09-04 VITALS
HEIGHT: 68 IN | RESPIRATION RATE: 16 BRPM | WEIGHT: 266.38 LBS | HEART RATE: 75 BPM | BODY MASS INDEX: 40.37 KG/M2 | OXYGEN SATURATION: 98 % | SYSTOLIC BLOOD PRESSURE: 132 MMHG | DIASTOLIC BLOOD PRESSURE: 70 MMHG

## 2019-09-04 PROBLEM — E78.5 HYPERLIPIDEMIA, UNSPECIFIED: Chronic | Status: ACTIVE | Noted: 2019-05-23

## 2019-09-04 PROBLEM — I10 ESSENTIAL (PRIMARY) HYPERTENSION: Chronic | Status: ACTIVE | Noted: 2019-05-23

## 2019-09-04 PROBLEM — N20.0 CALCULUS OF KIDNEY: Chronic | Status: ACTIVE | Noted: 2019-05-23

## 2019-09-04 PROCEDURE — 99213 OFFICE O/P EST LOW 20 MIN: CPT

## 2019-09-04 RX ORDER — CODEINE PHOSPHATE AND GUAIFENESIN 10; 100 MG/5ML; MG/5ML
100-10 LIQUID ORAL
Qty: 1 | Refills: 0 | Status: DISCONTINUED | COMMUNITY
Start: 2019-04-28 | End: 2019-09-04

## 2019-09-04 RX ORDER — LOSARTAN POTASSIUM 100 MG/1
TABLET, FILM COATED ORAL
Refills: 0 | Status: DISCONTINUED | COMMUNITY
End: 2019-09-04

## 2019-09-04 RX ORDER — BENZONATATE 200 MG/1
200 CAPSULE ORAL 3 TIMES DAILY
Qty: 30 | Refills: 0 | Status: DISCONTINUED | COMMUNITY
Start: 2019-05-28 | End: 2019-09-04

## 2019-09-04 NOTE — HISTORY OF PRESENT ILLNESS
[FreeTextEntry1] : hptn [de-identified] : The patient has had recent onset of the back pain which had been a severe problem in the past he has undergone several surgeries on the lumbar spine while at the orthopedist office his blood pressure was noted to be 190 systolic the PA there apparently prescribed him at that time celecoxib and tramadol no chest pain headache blurred vision shortness of breath etc.

## 2019-09-04 NOTE — PHYSICAL EXAM
[No Acute Distress] : no acute distress [Well Nourished] : well nourished [Well Developed] : well developed [Normal Outer Ear/Nose] : the outer ears and nose were normal in appearance [Normal Sclera/Conjunctiva] : normal sclera/conjunctiva [Normal Oropharynx] : the oropharynx was normal [No Lymphadenopathy] : no lymphadenopathy [No Accessory Muscle Use] : no accessory muscle use [Normal Rate] : normal rate  [Regular Rhythm] : with a regular rhythm [Soft] : abdomen soft [No Murmur] : no murmur heard [No HSM] : no HSM [de-identified] : Obese

## 2019-09-04 NOTE — ASSESSMENT
[FreeTextEntry1] : Patient currently has controlled blood pressure he is on multiple medications physical exam is unremarkable his main concern is his ongoing back pain. He will continue on his current medications we will have him keep a diary of blood pressures over the next week to 10 days he'll be sent for routine laboratory work. He has been advised that currently he may take celecoxib for a few days at a time but should his pressure become elevated he should stop it immediately followup telephonically and several days

## 2019-09-05 ENCOUNTER — APPOINTMENT (OUTPATIENT)
Dept: ULTRASOUND IMAGING | Facility: CLINIC | Age: 74
End: 2019-09-05

## 2019-09-05 LAB
25(OH)D3 SERPL-MCNC: 32 NG/ML
ALBUMIN SERPL ELPH-MCNC: 4.3 G/DL
ALP BLD-CCNC: 60 U/L
ALT SERPL-CCNC: 22 U/L
ANION GAP SERPL CALC-SCNC: 13 MMOL/L
AST SERPL-CCNC: 23 U/L
BASOPHILS # BLD AUTO: 0.03 K/UL
BASOPHILS NFR BLD AUTO: 0.4 %
BILIRUB SERPL-MCNC: 0.3 MG/DL
BUN SERPL-MCNC: 35 MG/DL
CALCIUM SERPL-MCNC: 9.4 MG/DL
CHLORIDE SERPL-SCNC: 104 MMOL/L
CO2 SERPL-SCNC: 26 MMOL/L
CREAT SERPL-MCNC: 1.6 MG/DL
EOSINOPHIL # BLD AUTO: 0.16 K/UL
EOSINOPHIL NFR BLD AUTO: 1.9 %
ERYTHROCYTE [SEDIMENTATION RATE] IN BLOOD BY WESTERGREN METHOD: 33 MM/HR
ESTIMATED AVERAGE GLUCOSE: 114 MG/DL
FERRITIN SERPL-MCNC: 202 NG/ML
GLUCOSE SERPL-MCNC: 113 MG/DL
HBA1C MFR BLD HPLC: 5.6 %
HCT VFR BLD CALC: 37.3 %
HGB BLD-MCNC: 12 G/DL
IMM GRANULOCYTES NFR BLD AUTO: 0.5 %
LYMPHOCYTES # BLD AUTO: 2.11 K/UL
LYMPHOCYTES NFR BLD AUTO: 24.8 %
MAN DIFF?: NORMAL
MCHC RBC-ENTMCNC: 29.3 PG
MCHC RBC-ENTMCNC: 32.2 GM/DL
MCV RBC AUTO: 91 FL
MONOCYTES # BLD AUTO: 0.83 K/UL
MONOCYTES NFR BLD AUTO: 9.7 %
NEUTROPHILS # BLD AUTO: 5.35 K/UL
NEUTROPHILS NFR BLD AUTO: 62.7 %
PLATELET # BLD AUTO: 186 K/UL
POTASSIUM SERPL-SCNC: 4.7 MMOL/L
PROT SERPL-MCNC: 7 G/DL
RBC # BLD: 4.1 M/UL
RBC # FLD: 14.2 %
SODIUM SERPL-SCNC: 143 MMOL/L
WBC # FLD AUTO: 8.52 K/UL

## 2019-09-09 LAB
TESTOST BND SERPL-MCNC: 3.5 PG/ML
TESTOST SERPL-MCNC: 400.3 NG/DL

## 2019-09-10 ENCOUNTER — FORM ENCOUNTER (OUTPATIENT)
Age: 74
End: 2019-09-10

## 2019-09-11 ENCOUNTER — APPOINTMENT (OUTPATIENT)
Dept: ULTRASOUND IMAGING | Facility: CLINIC | Age: 74
End: 2019-09-11
Payer: MEDICARE

## 2019-09-11 ENCOUNTER — OUTPATIENT (OUTPATIENT)
Dept: OUTPATIENT SERVICES | Facility: HOSPITAL | Age: 74
LOS: 1 days | End: 2019-09-11
Payer: MEDICARE

## 2019-09-11 DIAGNOSIS — R79.89 OTHER SPECIFIED ABNORMAL FINDINGS OF BLOOD CHEMISTRY: ICD-10-CM

## 2019-09-11 DIAGNOSIS — M10.9 GOUT, UNSPECIFIED: ICD-10-CM

## 2019-09-11 DIAGNOSIS — Z00.8 ENCOUNTER FOR OTHER GENERAL EXAMINATION: ICD-10-CM

## 2019-09-11 PROCEDURE — 76775 US EXAM ABDO BACK WALL LIM: CPT | Mod: 26

## 2019-09-11 PROCEDURE — 76775 US EXAM ABDO BACK WALL LIM: CPT

## 2019-09-23 ENCOUNTER — RX RENEWAL (OUTPATIENT)
Age: 74
End: 2019-09-23

## 2019-12-02 ENCOUNTER — NON-APPOINTMENT (OUTPATIENT)
Age: 74
End: 2019-12-02

## 2019-12-02 ENCOUNTER — INPATIENT (INPATIENT)
Facility: HOSPITAL | Age: 74
LOS: 0 days | Discharge: HOME | DRG: 313 | End: 2019-12-03
Attending: HOSPITALIST | Admitting: HOSPITALIST
Payer: MEDICARE

## 2019-12-02 ENCOUNTER — APPOINTMENT (OUTPATIENT)
Dept: FAMILY MEDICINE | Facility: CLINIC | Age: 74
End: 2019-12-02
Payer: MEDICARE

## 2019-12-02 VITALS
SYSTOLIC BLOOD PRESSURE: 150 MMHG | DIASTOLIC BLOOD PRESSURE: 72 MMHG | BODY MASS INDEX: 38.8 KG/M2 | HEART RATE: 95 BPM | RESPIRATION RATE: 17 BRPM | TEMPERATURE: 98 F | OXYGEN SATURATION: 97 % | WEIGHT: 256 LBS | HEIGHT: 68 IN

## 2019-12-02 VITALS — WEIGHT: 255.96 LBS

## 2019-12-02 DIAGNOSIS — R06.09 OTHER FORMS OF DYSPNEA: ICD-10-CM

## 2019-12-02 LAB
ALBUMIN SERPL ELPH-MCNC: 4.1 G/DL — SIGNIFICANT CHANGE UP (ref 3.3–5)
ALP SERPL-CCNC: 72 U/L — SIGNIFICANT CHANGE UP (ref 40–120)
ALT FLD-CCNC: 30 U/L — SIGNIFICANT CHANGE UP (ref 12–78)
ANION GAP SERPL CALC-SCNC: 11 MMOL/L — SIGNIFICANT CHANGE UP (ref 5–17)
APTT BLD: 31.4 SEC — SIGNIFICANT CHANGE UP (ref 27.5–36.3)
AST SERPL-CCNC: 20 U/L — SIGNIFICANT CHANGE UP (ref 15–37)
BASOPHILS # BLD AUTO: 0.05 K/UL — SIGNIFICANT CHANGE UP (ref 0–0.2)
BASOPHILS NFR BLD AUTO: 0.5 % — SIGNIFICANT CHANGE UP (ref 0–2)
BILIRUB SERPL-MCNC: 0.5 MG/DL — SIGNIFICANT CHANGE UP (ref 0.2–1.2)
BUN SERPL-MCNC: 25 MG/DL — HIGH (ref 7–23)
CALCIUM SERPL-MCNC: 10.3 MG/DL — HIGH (ref 8.5–10.1)
CHLORIDE SERPL-SCNC: 108 MMOL/L — SIGNIFICANT CHANGE UP (ref 96–108)
CO2 SERPL-SCNC: 22 MMOL/L — SIGNIFICANT CHANGE UP (ref 22–31)
CREAT SERPL-MCNC: 1.44 MG/DL — HIGH (ref 0.5–1.3)
EOSINOPHIL # BLD AUTO: 0.19 K/UL — SIGNIFICANT CHANGE UP (ref 0–0.5)
EOSINOPHIL NFR BLD AUTO: 1.9 % — SIGNIFICANT CHANGE UP (ref 0–6)
GLUCOSE SERPL-MCNC: 89 MG/DL — SIGNIFICANT CHANGE UP (ref 70–99)
HCT VFR BLD CALC: 35.5 % — LOW (ref 39–50)
HGB BLD-MCNC: 11.3 G/DL — LOW (ref 13–17)
IMM GRANULOCYTES NFR BLD AUTO: 0.6 % — SIGNIFICANT CHANGE UP (ref 0–1.5)
INR BLD: 1.06 RATIO — SIGNIFICANT CHANGE UP (ref 0.88–1.16)
LYMPHOCYTES # BLD AUTO: 2.23 K/UL — SIGNIFICANT CHANGE UP (ref 1–3.3)
LYMPHOCYTES # BLD AUTO: 22.6 % — SIGNIFICANT CHANGE UP (ref 13–44)
MCHC RBC-ENTMCNC: 28.9 PG — SIGNIFICANT CHANGE UP (ref 27–34)
MCHC RBC-ENTMCNC: 31.8 GM/DL — LOW (ref 32–36)
MCV RBC AUTO: 90.8 FL — SIGNIFICANT CHANGE UP (ref 80–100)
MONOCYTES # BLD AUTO: 0.84 K/UL — SIGNIFICANT CHANGE UP (ref 0–0.9)
MONOCYTES NFR BLD AUTO: 8.5 % — SIGNIFICANT CHANGE UP (ref 2–14)
NEUTROPHILS # BLD AUTO: 6.5 K/UL — SIGNIFICANT CHANGE UP (ref 1.8–7.4)
NEUTROPHILS NFR BLD AUTO: 65.9 % — SIGNIFICANT CHANGE UP (ref 43–77)
NT-PROBNP SERPL-SCNC: 247 PG/ML — HIGH (ref 0–125)
PLATELET # BLD AUTO: 214 K/UL — SIGNIFICANT CHANGE UP (ref 150–400)
POTASSIUM SERPL-MCNC: 4.6 MMOL/L — SIGNIFICANT CHANGE UP (ref 3.5–5.3)
POTASSIUM SERPL-SCNC: 4.6 MMOL/L — SIGNIFICANT CHANGE UP (ref 3.5–5.3)
PROT SERPL-MCNC: 8.2 GM/DL — SIGNIFICANT CHANGE UP (ref 6–8.3)
PROTHROM AB SERPL-ACNC: 11.8 SEC — SIGNIFICANT CHANGE UP (ref 10–12.9)
RBC # BLD: 3.91 M/UL — LOW (ref 4.2–5.8)
RBC # FLD: 14.8 % — HIGH (ref 10.3–14.5)
SODIUM SERPL-SCNC: 141 MMOL/L — SIGNIFICANT CHANGE UP (ref 135–145)
TROPONIN I SERPL-MCNC: <0.015 NG/ML — SIGNIFICANT CHANGE UP (ref 0.01–0.04)
TROPONIN I SERPL-MCNC: <0.015 NG/ML — SIGNIFICANT CHANGE UP (ref 0.01–0.04)
WBC # BLD: 9.87 K/UL — SIGNIFICANT CHANGE UP (ref 3.8–10.5)
WBC # FLD AUTO: 9.87 K/UL — SIGNIFICANT CHANGE UP (ref 3.8–10.5)

## 2019-12-02 PROCEDURE — 83735 ASSAY OF MAGNESIUM: CPT

## 2019-12-02 PROCEDURE — 80061 LIPID PANEL: CPT

## 2019-12-02 PROCEDURE — 93306 TTE W/DOPPLER COMPLETE: CPT

## 2019-12-02 PROCEDURE — 84443 ASSAY THYROID STIM HORMONE: CPT

## 2019-12-02 PROCEDURE — 93010 ELECTROCARDIOGRAM REPORT: CPT

## 2019-12-02 PROCEDURE — 84100 ASSAY OF PHOSPHORUS: CPT

## 2019-12-02 PROCEDURE — 71046 X-RAY EXAM CHEST 2 VIEWS: CPT | Mod: 26

## 2019-12-02 PROCEDURE — 36415 COLL VENOUS BLD VENIPUNCTURE: CPT

## 2019-12-02 PROCEDURE — 85730 THROMBOPLASTIN TIME PARTIAL: CPT

## 2019-12-02 PROCEDURE — 80053 COMPREHEN METABOLIC PANEL: CPT

## 2019-12-02 PROCEDURE — 99214 OFFICE O/P EST MOD 30 MIN: CPT | Mod: 25

## 2019-12-02 PROCEDURE — 85610 PROTHROMBIN TIME: CPT

## 2019-12-02 PROCEDURE — 85025 COMPLETE CBC W/AUTO DIFF WBC: CPT

## 2019-12-02 PROCEDURE — 86803 HEPATITIS C AB TEST: CPT

## 2019-12-02 PROCEDURE — 84484 ASSAY OF TROPONIN QUANT: CPT

## 2019-12-02 PROCEDURE — 93000 ELECTROCARDIOGRAM COMPLETE: CPT

## 2019-12-02 RX ORDER — FAMOTIDINE 10 MG/ML
20 INJECTION INTRAVENOUS ONCE
Refills: 0 | Status: COMPLETED | OUTPATIENT
Start: 2019-12-02 | End: 2019-12-02

## 2019-12-02 RX ORDER — ASPIRIN/CALCIUM CARB/MAGNESIUM 324 MG
325 TABLET ORAL ONCE
Refills: 0 | Status: COMPLETED | OUTPATIENT
Start: 2019-12-02 | End: 2019-12-02

## 2019-12-02 RX ADMIN — Medication 325 MILLIGRAM(S): at 16:51

## 2019-12-02 RX ADMIN — FAMOTIDINE 20 MILLIGRAM(S): 10 INJECTION INTRAVENOUS at 16:51

## 2019-12-02 NOTE — ED STATDOCS - OBJECTIVE STATEMENT
73 y/o m with PMHx of bladder CA, HLD, HTN presenting to the ED c/o CP x2 days. Pt states he has chronic SOB for years but 2 days ago began feeling burning sensation throughout chest and epigastric area. Also reports shooting pains going down legs s/p injection received for back pain last week. Hx of multiple cardiac stress tests in the past, last was x2 years ago, found to be normal. Former smoker, last smoked 30 years ago. Denies fever, chills, any other acute c/o. PCP: Dr. Sunny Mariano.

## 2019-12-02 NOTE — ED STATDOCS - PROGRESS NOTE DETAILS
73 y/o M with PMH of bladder CA, HLD, HTN presents with 2 days of substernal CP. Describes CP as burning sensation which he's never had before. Not relieved with Tums. Reports chronic SOB, worse over past 2 days as well. Admits "walking from the triage room to have my blood drawn made me short of breath". Pt's wife also reports pt is SOB with use of 2 stairs at home. Pt reports last stress test was 2 years ago which was negative. Former smoker. Denies fever, chills, vomiting, palpitations. PE: Well appearing. Cardiac: s1s2, RRR> Lungs: CTAB. Abdomen: NBS x4, soft, nontender. PV: No LE swelling or calf tenderness. A/P: R/o ACS. HEART score: 6. Plan for EKG, CXR, labs, likely admission. - Steffen Smith PA-C

## 2019-12-02 NOTE — PHYSICAL EXAM
[No Acute Distress] : no acute distress [Well Nourished] : well nourished [Normal Sclera/Conjunctiva] : normal sclera/conjunctiva [PERRL] : pupils equal round and reactive to light [Normal Outer Ear/Nose] : the outer ears and nose were normal in appearance [Normal Oropharynx] : the oropharynx was normal [No JVD] : no jugular venous distention [No Lymphadenopathy] : no lymphadenopathy [No Accessory Muscle Use] : no accessory muscle use [No Respiratory Distress] : no respiratory distress  [Regular Rhythm] : with a regular rhythm [Normal Rate] : normal rate  [No Murmur] : no murmur heard [No Edema] : there was no peripheral edema [No Palpable Aorta] : no palpable aorta [Soft] : abdomen soft [Non Tender] : non-tender [Non-distended] : non-distended [No HSM] : no HSM [Normal Bowel Sounds] : normal bowel sounds [Normal Supraclavicular Nodes] : no supraclavicular lymphadenopathy [Normal Posterior Cervical Nodes] : no posterior cervical lymphadenopathy [Normal Anterior Cervical Nodes] : no anterior cervical lymphadenopathy [No CVA Tenderness] : no CVA  tenderness [No Spinal Tenderness] : no spinal tenderness [No Joint Swelling] : no joint swelling [No Rash] : no rash [de-identified] : Morbid obesity

## 2019-12-02 NOTE — ED ADULT NURSE NOTE - NSIMPLEMENTINTERV_GEN_ALL_ED
Implemented All Universal Safety Interventions:  [place] to call system. Call bell, personal items and telephone within reach. Instruct patient to call for assistance. Room bathroom lighting operational. Non-slip footwear when patient is off stretcher. Physically safe environment: no spills, clutter or unnecessary equipment. Stretcher in lowest position, wheels locked, appropriate side rails in place.

## 2019-12-02 NOTE — ASSESSMENT
[FreeTextEntry1] : The patient will be sent to the [facility] emergency room for further evaluation he is stable at this point differential diagnosis includes ischemic heart disease anxiety and side effects to that she was sitting or other new pathology

## 2019-12-02 NOTE — HISTORY OF PRESENT ILLNESS
[FreeTextEntry8] : He is here today due to the increasing exertional dyspnea over the last several days this has gotten to the point where he finds it difficult to climb the stairs going into his office this is associated with a second sensation sensation of the what he calls "aggita" attending for a nonspecific dyspepsia type symptoms he points to his sternum when he has this sensation the patient is recently on Unasyn he had COPD one antagonist is known of course to cause GI side effects including pancreatitis he also has felt anxious of late and was started on Zoloft approximately one week ago at a low dose. He has a past medical history including obesity hypertension and the cancer of the bladder along with the sleep apnea. He had a stress test approximately a year ago but he said he states was normal

## 2019-12-02 NOTE — ED STATDOCS - CARE PLAN
Principal Discharge DX:	Dyspnea on exertion Principal Discharge DX:	Dyspnea on exertion  Secondary Diagnosis:	Chest pain

## 2019-12-02 NOTE — ED STATDOCS - CLINICAL SUMMARY MEDICAL DECISION MAKING FREE TEXT BOX
Patient presents with worsening SOB on exertion. HEART score 6. ED evaluation unremarkable. Plan for admission.

## 2019-12-02 NOTE — REVIEW OF SYSTEMS
[Night Sweats] : no night sweats [Chills] : no chills [Fever] : no fever [Vision Problems] : no vision problems [Sore Throat] : no sore throat [Chest Pain] : no chest pain [Lower Ext Edema] : no lower extremity edema [Palpitations] : no palpitations [Orthopena] : no orthopnea [Paroxysmal Nocturnal Dyspnea] : no paroxysmal nocturnal dyspnea [Wheezing] : no wheezing [Shortness Of Breath] : shortness of breath [Cough] : no cough [Dyspnea on Exertion] : dyspnea on exertion [Abdominal Pain] : abdominal pain [Nausea] : nausea [Constipation] : no constipation [Diarrhea] : no diarrhea [Vomiting] : no vomiting [Heartburn] : no heartburn [Melena] : no melena [Dysuria] : no dysuria [Incontinence] : no incontinence [Joint Stiffness] : no joint stiffness [Hematuria] : no hematuria [Skin Rash] : no skin rash [Back Pain] : no back pain [Headache] : no headache [Dizziness] : no dizziness [Unsteady Walk] : no ataxia [Anxiety] : anxiety

## 2019-12-03 ENCOUNTER — TRANSCRIPTION ENCOUNTER (OUTPATIENT)
Age: 74
End: 2019-12-03

## 2019-12-03 VITALS
TEMPERATURE: 98 F | DIASTOLIC BLOOD PRESSURE: 57 MMHG | HEART RATE: 78 BPM | OXYGEN SATURATION: 96 % | RESPIRATION RATE: 17 BRPM | SYSTOLIC BLOOD PRESSURE: 125 MMHG

## 2019-12-03 LAB
ALBUMIN SERPL ELPH-MCNC: 3.5 G/DL — SIGNIFICANT CHANGE UP (ref 3.3–5)
ALP SERPL-CCNC: 64 U/L — SIGNIFICANT CHANGE UP (ref 40–120)
ALT FLD-CCNC: 27 U/L — SIGNIFICANT CHANGE UP (ref 12–78)
ANION GAP SERPL CALC-SCNC: 8 MMOL/L — SIGNIFICANT CHANGE UP (ref 5–17)
APTT BLD: 29.5 SEC — SIGNIFICANT CHANGE UP (ref 27.5–36.3)
AST SERPL-CCNC: 21 U/L — SIGNIFICANT CHANGE UP (ref 15–37)
BASOPHILS # BLD AUTO: 0.04 K/UL — SIGNIFICANT CHANGE UP (ref 0–0.2)
BASOPHILS NFR BLD AUTO: 0.5 % — SIGNIFICANT CHANGE UP (ref 0–2)
BILIRUB SERPL-MCNC: 0.6 MG/DL — SIGNIFICANT CHANGE UP (ref 0.2–1.2)
BUN SERPL-MCNC: 24 MG/DL — HIGH (ref 7–23)
CALCIUM SERPL-MCNC: 9.5 MG/DL — SIGNIFICANT CHANGE UP (ref 8.5–10.1)
CHLORIDE SERPL-SCNC: 110 MMOL/L — HIGH (ref 96–108)
CHOLEST SERPL-MCNC: 176 MG/DL — SIGNIFICANT CHANGE UP (ref 10–199)
CO2 SERPL-SCNC: 24 MMOL/L — SIGNIFICANT CHANGE UP (ref 22–31)
CREAT SERPL-MCNC: 1.33 MG/DL — HIGH (ref 0.5–1.3)
EOSINOPHIL # BLD AUTO: 0.24 K/UL — SIGNIFICANT CHANGE UP (ref 0–0.5)
EOSINOPHIL NFR BLD AUTO: 3.2 % — SIGNIFICANT CHANGE UP (ref 0–6)
GLUCOSE SERPL-MCNC: 90 MG/DL — SIGNIFICANT CHANGE UP (ref 70–99)
HCT VFR BLD CALC: 31.3 % — LOW (ref 39–50)
HCV AB S/CO SERPL IA: 0.16 S/CO — SIGNIFICANT CHANGE UP (ref 0–0.99)
HCV AB SERPL-IMP: SIGNIFICANT CHANGE UP
HDLC SERPL-MCNC: 43 MG/DL — SIGNIFICANT CHANGE UP
HGB BLD-MCNC: 10.5 G/DL — LOW (ref 13–17)
IMM GRANULOCYTES NFR BLD AUTO: 0.5 % — SIGNIFICANT CHANGE UP (ref 0–1.5)
INR BLD: 1.1 RATIO — SIGNIFICANT CHANGE UP (ref 0.88–1.16)
LIPID PNL WITH DIRECT LDL SERPL: 92 MG/DL — SIGNIFICANT CHANGE UP
LYMPHOCYTES # BLD AUTO: 1.41 K/UL — SIGNIFICANT CHANGE UP (ref 1–3.3)
LYMPHOCYTES # BLD AUTO: 18.8 % — SIGNIFICANT CHANGE UP (ref 13–44)
MAGNESIUM SERPL-MCNC: 2.1 MG/DL — SIGNIFICANT CHANGE UP (ref 1.6–2.6)
MCHC RBC-ENTMCNC: 30 PG — SIGNIFICANT CHANGE UP (ref 27–34)
MCHC RBC-ENTMCNC: 33.5 GM/DL — SIGNIFICANT CHANGE UP (ref 32–36)
MCV RBC AUTO: 89.4 FL — SIGNIFICANT CHANGE UP (ref 80–100)
MONOCYTES # BLD AUTO: 0.75 K/UL — SIGNIFICANT CHANGE UP (ref 0–0.9)
MONOCYTES NFR BLD AUTO: 10 % — SIGNIFICANT CHANGE UP (ref 2–14)
NEUTROPHILS # BLD AUTO: 5.04 K/UL — SIGNIFICANT CHANGE UP (ref 1.8–7.4)
NEUTROPHILS NFR BLD AUTO: 67 % — SIGNIFICANT CHANGE UP (ref 43–77)
PHOSPHATE SERPL-MCNC: 4.3 MG/DL — SIGNIFICANT CHANGE UP (ref 2.5–4.5)
PLATELET # BLD AUTO: 189 K/UL — SIGNIFICANT CHANGE UP (ref 150–400)
POTASSIUM SERPL-MCNC: 4.5 MMOL/L — SIGNIFICANT CHANGE UP (ref 3.5–5.3)
POTASSIUM SERPL-SCNC: 4.5 MMOL/L — SIGNIFICANT CHANGE UP (ref 3.5–5.3)
PROT SERPL-MCNC: 7.2 GM/DL — SIGNIFICANT CHANGE UP (ref 6–8.3)
PROTHROM AB SERPL-ACNC: 12.3 SEC — SIGNIFICANT CHANGE UP (ref 10–12.9)
RBC # BLD: 3.5 M/UL — LOW (ref 4.2–5.8)
RBC # FLD: 14.8 % — HIGH (ref 10.3–14.5)
SODIUM SERPL-SCNC: 142 MMOL/L — SIGNIFICANT CHANGE UP (ref 135–145)
TOTAL CHOLESTEROL/HDL RATIO MEASUREMENT: 4.1 RATIO — SIGNIFICANT CHANGE UP (ref 3.4–9.6)
TRIGL SERPL-MCNC: 203 MG/DL — HIGH (ref 10–149)
TROPONIN I SERPL-MCNC: <0.015 NG/ML — SIGNIFICANT CHANGE UP (ref 0.01–0.04)
TSH SERPL-MCNC: 2.71 UU/ML — SIGNIFICANT CHANGE UP (ref 0.34–4.82)
WBC # BLD: 7.52 K/UL — SIGNIFICANT CHANGE UP (ref 3.8–10.5)
WBC # FLD AUTO: 7.52 K/UL — SIGNIFICANT CHANGE UP (ref 3.8–10.5)

## 2019-12-03 PROCEDURE — 93306 TTE W/DOPPLER COMPLETE: CPT | Mod: 26

## 2019-12-03 RX ORDER — PANTOPRAZOLE SODIUM 20 MG/1
40 TABLET, DELAYED RELEASE ORAL
Refills: 0 | Status: DISCONTINUED | OUTPATIENT
Start: 2019-12-03 | End: 2019-12-03

## 2019-12-03 RX ORDER — PANTOPRAZOLE SODIUM 20 MG/1
1 TABLET, DELAYED RELEASE ORAL
Qty: 30 | Refills: 0
Start: 2019-12-03 | End: 2020-01-01

## 2019-12-03 RX ORDER — ACETAMINOPHEN 500 MG
650 TABLET ORAL EVERY 6 HOURS
Refills: 0 | Status: DISCONTINUED | OUTPATIENT
Start: 2019-12-03 | End: 2019-12-03

## 2019-12-03 RX ORDER — METOPROLOL TARTRATE 50 MG
50 TABLET ORAL DAILY
Refills: 0 | Status: DISCONTINUED | OUTPATIENT
Start: 2019-12-03 | End: 2019-12-03

## 2019-12-03 RX ORDER — SERTRALINE 25 MG/1
25 TABLET, FILM COATED ORAL DAILY
Refills: 0 | Status: DISCONTINUED | OUTPATIENT
Start: 2019-12-03 | End: 2019-12-03

## 2019-12-03 RX ORDER — ATORVASTATIN CALCIUM 80 MG/1
40 TABLET, FILM COATED ORAL AT BEDTIME
Refills: 0 | Status: DISCONTINUED | OUTPATIENT
Start: 2019-12-03 | End: 2019-12-03

## 2019-12-03 RX ORDER — HEPARIN SODIUM 5000 [USP'U]/ML
5000 INJECTION INTRAVENOUS; SUBCUTANEOUS EVERY 8 HOURS
Refills: 0 | Status: DISCONTINUED | OUTPATIENT
Start: 2019-12-03 | End: 2019-12-03

## 2019-12-03 RX ORDER — AMLODIPINE BESYLATE 2.5 MG/1
10 TABLET ORAL DAILY
Refills: 0 | Status: DISCONTINUED | OUTPATIENT
Start: 2019-12-03 | End: 2019-12-03

## 2019-12-03 RX ORDER — NITROGLYCERIN 6.5 MG
0.4 CAPSULE, EXTENDED RELEASE ORAL
Refills: 0 | Status: DISCONTINUED | OUTPATIENT
Start: 2019-12-03 | End: 2019-12-03

## 2019-12-03 RX ORDER — LOSARTAN POTASSIUM 100 MG/1
100 TABLET, FILM COATED ORAL DAILY
Refills: 0 | Status: DISCONTINUED | OUTPATIENT
Start: 2019-12-03 | End: 2019-12-03

## 2019-12-03 RX ORDER — ASPIRIN/CALCIUM CARB/MAGNESIUM 324 MG
81 TABLET ORAL DAILY
Refills: 0 | Status: DISCONTINUED | OUTPATIENT
Start: 2019-12-03 | End: 2019-12-03

## 2019-12-03 RX ORDER — TAMSULOSIN HYDROCHLORIDE 0.4 MG/1
0.4 CAPSULE ORAL AT BEDTIME
Refills: 0 | Status: DISCONTINUED | OUTPATIENT
Start: 2019-12-03 | End: 2019-12-03

## 2019-12-03 RX ORDER — ASPIRIN/CALCIUM CARB/MAGNESIUM 324 MG
1 TABLET ORAL
Qty: 0 | Refills: 0 | DISCHARGE
Start: 2019-12-03

## 2019-12-03 RX ADMIN — PANTOPRAZOLE SODIUM 40 MILLIGRAM(S): 20 TABLET, DELAYED RELEASE ORAL at 05:28

## 2019-12-03 RX ADMIN — LOSARTAN POTASSIUM 100 MILLIGRAM(S): 100 TABLET, FILM COATED ORAL at 05:28

## 2019-12-03 RX ADMIN — Medication 50 MILLIGRAM(S): at 05:27

## 2019-12-03 RX ADMIN — AMLODIPINE BESYLATE 10 MILLIGRAM(S): 2.5 TABLET ORAL at 05:27

## 2019-12-03 RX ADMIN — Medication 81 MILLIGRAM(S): at 11:56

## 2019-12-03 RX ADMIN — TAMSULOSIN HYDROCHLORIDE 0.4 MILLIGRAM(S): 0.4 CAPSULE ORAL at 06:24

## 2019-12-03 RX ADMIN — SERTRALINE 25 MILLIGRAM(S): 25 TABLET, FILM COATED ORAL at 11:56

## 2019-12-03 NOTE — PATIENT PROFILE ADULT - STATED REASON FOR ADMISSION
Burning sensation in esophagus. Also SOB, normal for pt but has gotten worse over few weeks. Went to PC who told pt to come to ED

## 2019-12-03 NOTE — DISCHARGE NOTE PROVIDER - HOSPITAL COURSE
Pt is a 75 yo obese male with a pmh/o YO, chronic back pain with neuropathy, bladder CA, HLD, HTN, who presents after being refered from PMD office due to senstation of burning in his substernal area that had no a/a factors and radiates from epigastrium upward towards chest and neck. Pt states he has had the pain for two days and it has not been alleviated by antacids in ED or at home. Pt states he follows with a cardiologist named Dr. PATRICIA and has had multiple 'normal stress tests and heart caths before'. Pt currently is chest pain free and denies any complaints.        1) Atypical chest pain concerning for ACS    EKG reviewed, no STT elevation    -cont metoprolol    -cont losartan    -cont atorvastatin/ezetimibe    - serial troponin, neg x 1    - echo pending     - lipid panel pending     - heparin sq for DVT prophylaxis    - cardio cx - recc outpatient ischemic eval. will f/u w/ MD Brand        2) Neuropathy, chronic back pain, due to spinal stenosis and thoracic spondylosis    -off gabapentin    -follows with pain management, recently started on lyrica however has not taken first dose, if pain recurrs/worsens consider starting lyrica while inpatient    -tylenol prn    -consider flexeril prn while inpatient if indicated    -cont to f/u with outpt provider for pain mngmt- takes oxycodone prn sparingly every few weeks        3) HLD    -cont statin    -lipid profile    -DASH/TLC/low fat diet        4) HTN    -cont amlodipine        5) Obesity    -on Trulicity not for pre/DMII but rather for weight loss    -pt follows with outpt [facility] obesity clinic    -chronic SOB likely due to component of obesity hypoventilation syndrome    -low fat diet        6) YO    -CPAP 4 ordered qhs and prn         Dispo: discharge to home in stable condition        Final diagnosis, treatment plan, and follow-up recommendations were discussed and explained to the patient. The patient was given an opportunity to ask questions concerning the diagnosis and treatment plan. The patient acknowledged understanding of the diagnosis, treatment, and follow-up recommendations. The patient was advised to seek urgent care upon discharge if worsening symptoms develop prior to scheduled follow-up. Time spent on discharge included time with the patient, and also coordinating discharge care as outlined below.        Total time spent: 50 min Pt is a 73 yo obese male with a pmh/o YO, chronic back pain with neuropathy, bladder CA, HLD, HTN, who presents after being refered from PMD office due to senstation of burning in his substernal area that had no a/a factors and radiates from epigastrium upward towards chest and neck. Pt states he has had the pain for two days and it has not been alleviated by antacids in ED or at home. Pt states he follows with a cardiologist named Dr. PATRICIA and has had multiple 'normal stress tests and heart caths before'. Pt currently is chest pain free and denies any complaints.   See my chart note from today.         PHYSICAL EXAM:        General: NAD, comfortable    Neuro: AAOx3, no focal deficits    HEENT: NCAT    Neck: Soft and supple    Respiratory: CTA b/l, no w/r/r    Cardiovascular: S1 and S2, RRR, no m/g/r    Gastrointestinal: +BS, soft, NTND, no rebound/guarding    Extremities: No c/c/e    Vascular: 2+ peripheral pulses        A&P        1) Atypical chest pain - resolved     unlikely ACS .  Trops negative and ekg no ischemia     -cont metoprolol    -cont losartan    -cont atorvastatin/ezetimibe    - serial troponin, neg x 1    - echo pending - will f/u in office    - lipid panel pending - will f/u in office     - heparin sq for DVT prophylaxis    - cardio cx - recc outpatient ischemic eval. will f/u w/ MD Brand    - continue aspirin        2) Neuropathy, chronic back pain, due to spinal stenosis and thoracic spondylosis    -off gabapentin    -follows with pain management, recently started on lyrica however has not taken first dose, if pain recurrs/worsens consider starting lyrica while inpatient    -tylenol prn    -consider flexeril prn while inpatient if indicated    -cont to f/u with outpt provider for pain mngmt- takes oxycodone prn sparingly every few weeks        3) HLD    -cont statin    -lipid profile    -DASH/TLC/low fat diet        4) HTN    -cont amlodipine        5) Obesity    -on Trulicity not for pre/DMII but rather for weight loss    -pt follows with outpt [facility] obesity clinic    -chronic SOB likely due to component of obesity hypoventilation syndrome    -low fat diet        6) YO    -CPAP 4 ordered qhs and prn         Dispo: discharge to home in stable condition        Final diagnosis, treatment plan, and follow-up recommendations were discussed and explained to the patient. The patient was given an opportunity to ask questions concerning the diagnosis and treatment plan. The patient acknowledged understanding of the diagnosis, treatment, and follow-up recommendations. The patient was advised to seek urgent care upon discharge if worsening symptoms develop prior to scheduled follow-up. Time spent on discharge included time with the patient, and also coordinating discharge care as outlined below.        Total time spent: 50 min        Stable for d/c.  Reviewed w/ pt, team, NP [place].

## 2019-12-03 NOTE — H&P ADULT - NSICDXPASTMEDICALHX_GEN_ALL_CORE_FT
PAST MEDICAL HISTORY:  Bladder cancer     HLD (hyperlipidemia)     HTN (hypertension)     Kidney stones     Neuropathy

## 2019-12-03 NOTE — H&P ADULT - ASSESSMENT
75 yo obese male with a pmh/o YO, chronic back pain with neuropathy, bladder CA, HLD, HTN, admitted for:    1) Atypical chest pain concerning for ACS  admit to telemetry  EKG reviewed, no STT elevation  EKG prn chest pain  NTG prn chest pain  -cont metoprolol  -cont losartan  -cont atorvastatin/ezetimibe  serial troponin, neg x 1  TTE ordered  HbA1c ordered  lipid panel ordered  cardiology consulted  fall precautions  K>4, Mg>2  f/u AM cbc, bmp, mg, phos, coags  intermittent pneumatic compression and heparin sq for DVT prophylaxis    2) Neuropathy, chronic, due to disc herniations  -off gabapentin  -follows with pain management, recently started on lyrica however has not taken first dose, if pain recurrs/worsens consider starting lyrica while inpatient  -tylenol prn  -consider flexeril prn while inpatient if indicated  -cont to f/u with outpt provider for pain mngmt- takes oxycodone prn sparingly every few weeks    3) HLD  -cont statin  -lipid profile  -DASH/TLC/low fat diet when made PO (in case of no intervention)    4) HTN  -cont amlodipine    5) Obesity  -on trulicity not for pre/DMII but rather for weight loss  -pt follows with outpt [facility] obesity clinic  -chronic SOB likely due to component of obesity hypoventilation syndrome  -low fat diet    6) YO  -CPAP 4 ordered qhs and prn 73 yo obese male with a pmh/o YO, chronic back pain with neuropathy, bladder CA, HLD, HTN, admitted for:    1) Atypical chest pain concerning for ACS  admit to telemetry  EKG reviewed, no STT elevation  EKG prn chest pain  NTG prn chest pain  -cont metoprolol  -cont losartan  -cont atorvastatin/ezetimibe  serial troponin, neg x 1  TTE ordered  HbA1c ordered  lipid panel ordered  cardiology consulted  fall precautions  K>4, Mg>2  f/u AM cbc, bmp, mg, phos, coags  intermittent pneumatic compression and heparin sq for DVT prophylaxis    2) Neuropathy, chronic back pain, due to spinal stenosis and thoracic spondylosis  -off gabapentin  -follows with pain management, recently started on lyrica however has not taken first dose, if pain recurrs/worsens consider starting lyrica while inpatient  -tylenol prn  -consider flexeril prn while inpatient if indicated  -cont to f/u with outpt provider for pain mngmt- takes oxycodone prn sparingly every few weeks    3) HLD  -cont statin  -lipid profile  -DASH/TLC/low fat diet when made PO (in case of no intervention)    4) HTN  -cont amlodipine    5) Obesity  -on trulicity not for pre/DMII but rather for weight loss  -pt follows with outpt [facility] obesity clinic  -chronic SOB likely due to component of obesity hypoventilation syndrome  -low fat diet    6) YO  -CPAP 4 ordered qhs and prn

## 2019-12-03 NOTE — H&P ADULT - HISTORY OF PRESENT ILLNESS
Pt is a 73 yo obese male with a pmh/o YO, chronic back pain with neuropathy, bladder CA, HLD, HTN, who presents after being refered from PMD office due to senstation of burning in his substernal area that had no a/a factors and radiates from epigastrium upward towards chest and neck. Pt states he has had the pain for two days and it has not been alleviated by antacids in ED or at home. Pt states he follows with a cardiologist named Dr. PATRICIA and has had multiple 'normal stress tests and heart caths before'. Pt currently is chest pain free and denies any complaints.    Denies n/v/d, HA, palpitations, diaphoresis, jaw pain, recent strenuous activity, dysuria, constipation, abdominal pain.

## 2019-12-03 NOTE — CHART NOTE - NSCHARTNOTEFT_GEN_A_CORE
cc: cp  hpi: 74y male admitted early this morning w/ chest pain.  Pt  ros-  Vital Signs Last 24 Hrs  T(C): 36.5 (03 Dec 2019 05:21), Max: 37.2 (02 Dec 2019 16:07)  T(F): 97.7 (03 Dec 2019 05:21), Max: 98.9 (02 Dec 2019 16:07)  HR: 92 (03 Dec 2019 05:21) (78 - 94)  BP: 179/74 (03 Dec 2019 05:21) (147/64 - 179/74)  BP(mean): --  RR: 18 (03 Dec 2019 05:21) (18 - 18)  SpO2: 95% (03 Dec 2019 05:21) (95% - 98%)      LABS: All Labs Reviewed:                        10.5   7.52  )-----------( 189      ( 03 Dec 2019 07:43 )             31.3     12-02    141  |  108  |  25<H>  ----------------------------<  89  4.6   |  22  |  1.44<H>    Ca    10.3<H>      02 Dec 2019 16:41    TPro  8.2  /  Alb  4.1  /  TBili  0.5  /  DBili  x   /  AST  20  /  ALT  30  /  AlkPhos  72  12-02    PT/INR - ( 02 Dec 2019 16:41 )   PT: 11.8 sec;   INR: 1.06 ratio         PTT - ( 02 Dec 2019 16:41 )  PTT:31.4 sec  CARDIAC MARKERS ( 02 Dec 2019 20:05 )  <0.015 ng/mL / x     / x     / x     / x      CARDIAC MARKERS ( 02 Dec 2019 16:41 )  <0.015 ng/mL / x     / x     / x     / x        MEDICATIONS  (STANDING):  amLODIPine   Tablet 10 milliGRAM(s) Oral daily  aspirin enteric coated 81 milliGRAM(s) Oral daily  atorvastatin 40 milliGRAM(s) Oral at bedtime  heparin  Injectable 5000 Unit(s) SubCutaneous every 8 hours  losartan 100 milliGRAM(s) Oral daily  metoprolol succinate ER 50 milliGRAM(s) Oral daily  pantoprazole    Tablet 40 milliGRAM(s) Oral before breakfast  sertraline 25 milliGRAM(s) Oral daily  tamsulosin 0.4 milliGRAM(s) Oral at bedtime    MEDICATIONS  (PRN):  acetaminophen   Tablet .. 650 milliGRAM(s) Oral every 6 hours PRN Temp greater or equal to 38C (100.4F), Mild Pain (1 - 3)  nitroglycerin     SubLingual 0.4 milliGRAM(s) SubLingual every 5 minutes PRN Chest Pain          Assessment and Plan:   74y male w/     1. chest pain  - trops neg x 2   - ekg-  - a1c and lipids pending  - aspirin, statin, BB cc: cp  hpi: 74y male admitted early this morning w/ chest pain.  Describes it as burning sensation in upper abd and radiated up- states it felt like heartburn, however he's never had heart burn in past.  His PMD advised him to come to ED.   No recurrent episodes here.  Also complains of sob worse w/ exertion , can't ambulate more than a few feet w/o symptoms and worsening over past month.  Last stress test > 1.5yrs ago normal.     ros- as per hpi above, other 10 point ros neg     Vital Signs Last 24 Hrs  T(C): 36.5 (03 Dec 2019 05:21), Max: 37.2 (02 Dec 2019 16:07)  T(F): 97.7 (03 Dec 2019 05:21), Max: 98.9 (02 Dec 2019 16:07)  HR: 92 (03 Dec 2019 05:21) (78 - 94)  BP: 179/74 (03 Dec 2019 05:21) (147/64 - 179/74)  BP(mean): --  RR: 18 (03 Dec 2019 05:21) (18 - 18)  SpO2: 95% (03 Dec 2019 05:21) (95% - 98%)      LABS: All Labs Reviewed:                        10.5   7.52  )-----------( 189      ( 03 Dec 2019 07:43 )             31.3     12-02    141  |  108  |  25<H>  ----------------------------<  89  4.6   |  22  |  1.44<H>    Ca    10.3<H>      02 Dec 2019 16:41    TPro  8.2  /  Alb  4.1  /  TBili  0.5  /  DBili  x   /  AST  20  /  ALT  30  /  AlkPhos  72  12-02    PT/INR - ( 02 Dec 2019 16:41 )   PT: 11.8 sec;   INR: 1.06 ratio         PTT - ( 02 Dec 2019 16:41 )  PTT:31.4 sec  CARDIAC MARKERS ( 02 Dec 2019 20:05 )  <0.015 ng/mL / x     / x     / x     / x      CARDIAC MARKERS ( 02 Dec 2019 16:41 )  <0.015 ng/mL / x     / x     / x     / x        MEDICATIONS  (STANDING):  amLODIPine   Tablet 10 milliGRAM(s) Oral daily  aspirin enteric coated 81 milliGRAM(s) Oral daily  atorvastatin 40 milliGRAM(s) Oral at bedtime  heparin  Injectable 5000 Unit(s) SubCutaneous every 8 hours  losartan 100 milliGRAM(s) Oral daily  metoprolol succinate ER 50 milliGRAM(s) Oral daily  pantoprazole    Tablet 40 milliGRAM(s) Oral before breakfast  sertraline 25 milliGRAM(s) Oral daily  tamsulosin 0.4 milliGRAM(s) Oral at bedtime    MEDICATIONS  (PRN):  acetaminophen   Tablet .. 650 milliGRAM(s) Oral every 6 hours PRN Temp greater or equal to 38C (100.4F), Mild Pain (1 - 3)  nitroglycerin     SubLingual 0.4 milliGRAM(s) SubLingual every 5 minutes PRN Chest Pain          Assessment and Plan:   74y male w/     1. chest pain  - trops neg x 2   - ekg -  - a1c and lipids pending  - aspirin, statin, BB  - tele  - Cardio eval  - possible stress test tomorrow    2. dvt px

## 2019-12-03 NOTE — CONSULT NOTE ADULT - SUBJECTIVE AND OBJECTIVE BOX
Patient is a 74y old  Male who presents with a chief complaint of atypical chest pain concerning for ACS.      HPI:  Pt is a 75 yo obese male with a pmh/o YO, chronic back pain with neuropathy, bladder CA, HLD, HTN, who presents after being refered from PMD office due to senstation of burning in his substernal area that had no a/a factors and radiates from epigastrium upward towards chest and neck. Pt states he has had the pain for two days and it has not been alleviated by antacids in ED or at home. Pt states he follows with a cardiologist named Dr. PATRICIA and has had stress test about 1.5yr ago that was reportedly normal in [place].  he had a left heart cath many yrs ago.    he states that the burning sensation resolved and did not recur with ambulation to rest room.  pt states that lately with his severe back pain he does not move much and he mostly gets from chair to car and back.  pt denies any dizziness or palpitations.           PAST MEDICAL & SURGICAL HISTORY:  Neuropathy  Kidney stones  Bladder cancer  HLD (hyperlipidemia)  HTN (hypertension)  No significant past surgical history      MEDICATIONS  (STANDING):  amLODIPine   Tablet 10 milliGRAM(s) Oral daily  aspirin enteric coated 81 milliGRAM(s) Oral daily  atorvastatin 40 milliGRAM(s) Oral at bedtime  heparin  Injectable 5000 Unit(s) SubCutaneous every 8 hours  losartan 100 milliGRAM(s) Oral daily  metoprolol succinate ER 50 milliGRAM(s) Oral daily  pantoprazole    Tablet 40 milliGRAM(s) Oral before breakfast  sertraline 25 milliGRAM(s) Oral daily  tamsulosin 0.4 milliGRAM(s) Oral at bedtime    MEDICATIONS  (PRN):  acetaminophen   Tablet .. 650 milliGRAM(s) Oral every 6 hours PRN Temp greater or equal to 38C (100.4F), Mild Pain (1 - 3)  nitroglycerin     SubLingual 0.4 milliGRAM(s) SubLingual every 5 minutes PRN Chest Pain      FAMILY HISTORY:  No pertinent family history in first degree relatives      SOCIAL HISTORY:  ex smoker     REVIEW OF SYSTEMS:  CONSTITUTIONAL:    No fatigue, malaise, lethargy.  No fever or chills.  RESPIRATORY:  No cough.  No wheeze.  No hemoptysis.  c/o shortness of breath.  CARDIOVASCULAR:  c/o chest pains.  No palpitations. c/o shortness of breath, No orthopnea or PND.  GASTROINTESTINAL:  No abdominal pain.  No nausea or vomiting.    GENITOURINARY:    No hematuria.    MUSCULOSKELETAL:  severe back and thigh pain   NEUROLOGICAL:  No tingling or numbness or weakness.  PSYCHIATRIC:  No confusion          Vital Signs Last 24 Hrs  T(C): 36.6 (03 Dec 2019 11:42), Max: 37.2 (02 Dec 2019 16:07)  T(F): 97.9 (03 Dec 2019 11:42), Max: 98.9 (02 Dec 2019 16:07)  HR: 78 (03 Dec 2019 11:42) (78 - 94)  BP: 125/57 (03 Dec 2019 11:42) (125/57 - 179/74)  BP(mean): --  RR: 17 (03 Dec 2019 11:42) (17 - 18)  SpO2: 96% (03 Dec 2019 11:42) (95% - 98%)    PHYSICAL EXAM-    Constitutional: obese male in no acute distress     Head: Head is normocephalic and atraumatic.      Neck:  No JVD.     Cardiovascular: Regular rate and rhythm without S3, S4. No murmurs or rubs are appreciated.      Respiratory: Breathsounds are normal. No rales. No wheezing.    Abdomen: Soft, nontender, nondistended with positive bowel sounds.      Extremity: No tenderness. No  pitting edema     Neurologic: The patient is alert and oriented.      Skin: No rash, no obvious lesions noted.      Psychiatric: The patient appears to be emotionally stable.      INTERPRETATION OF TELEMETRY: sinus rythm, pause of 2 sec    ECG: Sinus rythm ,L axis, no st t changes.     I&O's Detail      LABS:                        10.5   7.52  )-----------( 189      ( 03 Dec 2019 07:43 )             31.3     12-03    142  |  110<H>  |  24<H>  ----------------------------<  90  4.5   |  24  |  1.33<H>    Ca    9.5      03 Dec 2019 07:43  Phos  4.3     12-03  Mg     2.1     12-03    TPro  7.2  /  Alb  3.5  /  TBili  0.6  /  DBili  x   /  AST  21  /  ALT  27  /  AlkPhos  64  12-03    CARDIAC MARKERS ( 03 Dec 2019 07:43 )  <0.015 ng/mL / x     / x     / x     / x      CARDIAC MARKERS ( 02 Dec 2019 20:05 )  <0.015 ng/mL / x     / x     / x     / x      CARDIAC MARKERS ( 02 Dec 2019 16:41 )  <0.015 ng/mL / x     / x     / x     / x          PT/INR - ( 03 Dec 2019 07:43 )   PT: 12.3 sec;   INR: 1.10 ratio         PTT - ( 03 Dec 2019 07:43 )  PTT:29.5 sec    I&O's Summary    BNPSerum Pro-Brain Natriuretic Peptide: 247 pg/mL (12-02 @ 16:41)    RADIOLOGY & ADDITIONAL STUDIES:  < from: Xray Chest 2 Views PA/Lat (12.02.19 @ 17:02) >    EXAM:  XR CHEST PA LAT 2V                            PROCEDURE DATE:  12/02/2019          INTERPRETATION:  AP and lateral chest on December 2, 2019 at 4:55 PM.   Patient has been short of breath for several days.    Heart size is within normal limits.    There is no sense of active lung or pleural finding.    Prominent epicardial fat pads noted and can be seen on CAT scan of May 23.    The chest is similar to May 23 of this year.    IMPRESSION: No acute finding or change.                TRINITY PRUITT   This document has been electronically signed. Dec  2 2019  5:06PM        < end of copied text >

## 2019-12-03 NOTE — DISCHARGE NOTE NURSING/CASE MANAGEMENT/SOCIAL WORK - PATIENT PORTAL LINK FT
You can access the FollowMyHealth Patient Portal offered by [facility] by registering at the following website: [email or web address]. By joining [facility]’s FollowMyHealth portal, you will also be able to view your health information using other applications (apps) compatible with our system.

## 2019-12-03 NOTE — H&P ADULT - NSICDXNOPASTSURGICALHX_GEN_ALL_CORE
Subjective:  Thelma singer a pleasant 37 year old female was seen in follow-up for right shoulder a.c. separation in her low back pain. She reports that she is doing very well. She has excellent range of motion but does have some pain right at the a.c. joint with certain maneuvers in physical therapy. She notes that the pain is worse with activity and it is better with rest. She is not had any rapid progression of pain, numbness, tingling or weakness the right upper extremity.    She reports her low back pain has improved. She slept funny last night and has a little low back pain but no other radiation of pain. She's had no paresthesias or dysesthesias. She reports that her pain is very mild. She denies any fevers, chills or malaise. She denies any shortness of breath.    Objective:  Thelma willams pleasant 37-year-old female is alert and oriented ×3. Exam of the right shoulder reveals negative sulcus sign. There is no anterior posterior instability. She has no pain palpation of the SC joint proximal mid or distal clavicle. There is no pain palpation a.c. joint or acromion. There is no pain palpation of soft tissue or bony landmarks of the right shoulder. She has 180° for flexion and abduction which compares with the left. She has good strength with abduction adduction internal next rotation both shoulders. She has no pain with Neer and Vega her crossarm impingement. She has good movement, sensation and circulation us right upper extremity.    She is able to flex at the waist to 90° and extended 20. She is able to walk on her toes or heels. She has good strength in resisted leg raise, abduction and adduction or hips, flexion extensor needs, dorsal flexion plantar flexion of her feet. She has sensation of soft touch intact. She has 2+ patella and Achilles tendon reflexes. She has no clonus.    I have reviewed the patient's medications and allergies, past medical, surgical, social and family history, updating these  as appropriate. See Histories section of the EMR for a display of this information.    Imaging:  Prior x-rays right shoulder and low back are reviewed with patient today.    Assessment:  #1 right shoulder a.c. separation stable. #2 low back pain improved.    Plan:  The plan is to have her continue her activity as tolerated. She is going to return to work that any restrictions. If she would have pain at work she should discontinue offending activity. She may then discontinue that offending activity and return to clinic otherwise she does not need follow-up. She understands this and is agreeable to this plan.    Orville Lang MD serves as my supervising physician and was available for questions regarding formulation of diagnosis and plan and review of patient care. If Orville Lang is unavailable, an alternate supervising physician will cover his responsibilities based on established yearly written agreement and daily availability of said physicians.     <-- Click to add NO significant Past Surgical History

## 2019-12-03 NOTE — DISCHARGE NOTE PROVIDER - NSDCMRMEDTOKEN_GEN_ALL_CORE_FT
amLODIPine 10 mg oral tablet: 1 tab(s) orally once a day  aspirin 81 mg oral delayed release tablet: 1 tab(s) orally once a day  atorvastatin 40 mg oral tablet: 1 tab(s) orally once a day  ezetimibe 10 mg oral tablet: 1 tab(s) orally once a day  losartan 100 mg oral tablet: 1 tab(s) orally once a day  Metoprolol Succinate ER 50 mg oral tablet, extended release: 1 tab(s) orally once a day  pantoprazole 40 mg oral delayed release tablet: 1 tab(s) orally once a day (before a meal)  potassium citrate 15 mEq oral tablet, extended release: 1 tab(s) orally 2 times a day  pregabalin 75 mg oral capsule: 1 cap(s) orally 2 times a day  ***New prescription, mercedes has not started taking yet***  sertraline 25 mg oral tablet: 1 tab(s) orally once a day  tamsulosin 0.4 mg oral capsule: 1 cap(s) orally once a day  Trulicity Pen 1.5 mg/0.5 mL subcutaneous solution: 1.5 milligram(s) subcutaneous once a week on Thursdays

## 2019-12-03 NOTE — DISCHARGE NOTE PROVIDER - NSDCCPCAREPLAN_GEN_ALL_CORE_FT
PRINCIPAL DISCHARGE DIAGNOSIS  Diagnosis: Dyspnea on exertion  Assessment and Plan of Treatment: Follow up with Dr. Brand for stress test and for echo/lab results.  Continue current meds.  In addition, take aspirin 81mg daily.

## 2019-12-03 NOTE — CONSULT NOTE ADULT - ASSESSMENT
1. Chest pain- Chest pain is atypical in nature. So far cardiac enzymes and EKG did not reveal any ischemia.   Outpt ischemic heart disease evaluation recommended.  f/u with cardiologist as outpt.  he states he moved to [place] and would like to followup with me as  outpt.    2. HTN- this am BP reading was before meds.  Now the BP was 123/85.  Continue home BP meds.    3. Hyperlipidemia- continue atorvastatin.  LFTS normal.    4. SOB and GARCIA- His cardiac BNP low and I reviewed his 2 D echo which did not reveal any volume overload.  Will perform ischemic heart disease evaluation as outpt.  He could have severe physical deconditioning as well secondary to poor mobility and musculoskeletal issues.    Other medical issues- Management per primary team.    Thank you for allowing me to participate in the care of this patient. Please feel free to contact me with any questions.

## 2019-12-03 NOTE — DISCHARGE NOTE PROVIDER - CARE PROVIDER_API CALL
Chantell Altman)  Cardiovascular Disease; Internal Medicine  [address] [place] [address]  Phone: [phone]  Fax: [phone]  Follow Up Time:     Sunny Mariano)  Family Medicine; Geriatric Medicine  [address] [place] [address]  Phone: [phone]  Fax: [phone]  Follow Up Time:

## 2019-12-03 NOTE — H&P ADULT - ATTENDING COMMENTS
Patient emergency contact is wife Latoya, who would make medical decisions on his behalf should he become unable as per patient. Pt at this time is full code. Patient emergency contact is wife Latoya, who would make medical decisions on his behalf should he become unable as per patient. Pt at this time is full code. 17 min spent discussing advanced care planning

## 2019-12-10 DIAGNOSIS — R06.02 SHORTNESS OF BREATH: ICD-10-CM

## 2019-12-10 DIAGNOSIS — G47.33 OBSTRUCTIVE SLEEP APNEA (ADULT) (PEDIATRIC): ICD-10-CM

## 2019-12-10 DIAGNOSIS — Z85.51 PERSONAL HISTORY OF MALIGNANT NEOPLASM OF BLADDER: ICD-10-CM

## 2019-12-10 DIAGNOSIS — M47.814 SPONDYLOSIS WITHOUT MYELOPATHY OR RADICULOPATHY, THORACIC REGION: ICD-10-CM

## 2019-12-10 DIAGNOSIS — E78.5 HYPERLIPIDEMIA, UNSPECIFIED: ICD-10-CM

## 2019-12-10 DIAGNOSIS — G62.9 POLYNEUROPATHY, UNSPECIFIED: ICD-10-CM

## 2019-12-10 DIAGNOSIS — M48.04 SPINAL STENOSIS, THORACIC REGION: ICD-10-CM

## 2019-12-10 DIAGNOSIS — R07.89 OTHER CHEST PAIN: ICD-10-CM

## 2019-12-10 DIAGNOSIS — E66.2 MORBID (SEVERE) OBESITY WITH ALVEOLAR HYPOVENTILATION: ICD-10-CM

## 2019-12-10 DIAGNOSIS — I10 ESSENTIAL (PRIMARY) HYPERTENSION: ICD-10-CM

## 2020-01-30 ENCOUNTER — FORM ENCOUNTER (OUTPATIENT)
Age: 75
End: 2020-01-30

## 2020-01-31 ENCOUNTER — APPOINTMENT (OUTPATIENT)
Dept: RADIOLOGY | Facility: CLINIC | Age: 75
End: 2020-01-31
Payer: MEDICARE

## 2020-01-31 ENCOUNTER — APPOINTMENT (OUTPATIENT)
Dept: FAMILY MEDICINE | Facility: CLINIC | Age: 75
End: 2020-01-31
Payer: MEDICARE

## 2020-01-31 ENCOUNTER — OUTPATIENT (OUTPATIENT)
Dept: OUTPATIENT SERVICES | Facility: HOSPITAL | Age: 75
LOS: 1 days | End: 2020-01-31
Payer: MEDICARE

## 2020-01-31 VITALS
WEIGHT: 270.25 LBS | DIASTOLIC BLOOD PRESSURE: 68 MMHG | SYSTOLIC BLOOD PRESSURE: 138 MMHG | BODY MASS INDEX: 40.96 KG/M2 | HEIGHT: 68 IN | HEART RATE: 73 BPM | OXYGEN SATURATION: 95 % | RESPIRATION RATE: 16 BRPM

## 2020-01-31 DIAGNOSIS — Z00.8 ENCOUNTER FOR OTHER GENERAL EXAMINATION: ICD-10-CM

## 2020-01-31 PROCEDURE — 71046 X-RAY EXAM CHEST 2 VIEWS: CPT

## 2020-01-31 PROCEDURE — 99214 OFFICE O/P EST MOD 30 MIN: CPT

## 2020-01-31 PROCEDURE — 71046 X-RAY EXAM CHEST 2 VIEWS: CPT | Mod: 26

## 2020-01-31 RX ORDER — CELECOXIB 200 MG/1
200 CAPSULE ORAL TWICE DAILY
Qty: 60 | Refills: 1 | Status: DISCONTINUED | COMMUNITY
Start: 2019-03-07 | End: 2020-01-31

## 2020-01-31 NOTE — ASSESSMENT
[FreeTextEntry1] : Source of the peripheral edema it is likely multifactorial patient is a morbidly obese he also is on amlodipine 10 mg daily and has been on vacation with them consuming essentially only restaurant food likely increasing his salt intake his blood pressure is under good control he is headache cardiac workup recently that has been normal including a nuclear stress test and echocardiogram. He has not had a pulmonary evaluation in any event for the time being we will discontinue his amlodipine 10 mg and hydrochlorothiazide 25 mg perform a chest x-ray and obtain new blood work followup in several days cardiology will be contacted

## 2020-01-31 NOTE — PHYSICAL EXAM
[No Acute Distress] : no acute distress [Well Developed] : well developed [Normal Sclera/Conjunctiva] : normal sclera/conjunctiva [PERRL] : pupils equal round and reactive to light [Normal Outer Ear/Nose] : the outer ears and nose were normal in appearance [Normal Oropharynx] : the oropharynx was normal [No JVD] : no jugular venous distention [No Lymphadenopathy] : no lymphadenopathy [No Respiratory Distress] : no respiratory distress  [Normal Rate] : normal rate  [Regular Rhythm] : with a regular rhythm [No Murmur] : no murmur heard [Soft] : abdomen soft [Non Tender] : non-tender [Non-distended] : non-distended [No Masses] : no abdominal mass palpated [No HSM] : no HSM [Normal Supraclavicular Nodes] : no supraclavicular lymphadenopathy [Normal Anterior Cervical Nodes] : no anterior cervical lymphadenopathy [No CVA Tenderness] : no CVA  tenderness [No Joint Swelling] : no joint swelling [Grossly Normal Strength/Tone] : grossly normal strength/tone [No Rash] : no rash [Coordination Grossly Intact] : coordination grossly intact [No Focal Deficits] : no focal deficits [Normal Gait] : normal gait [Deep Tendon Reflexes (DTR)] : deep tendon reflexes were 2+ and symmetric [de-identified] : Morbid obesity [de-identified] : Rales bilaterally at the bases [de-identified] : Pitting edema bilaterally at the ankles

## 2020-01-31 NOTE — HISTORY OF PRESENT ILLNESS
[FreeTextEntry8] : Patient here complaining of the swelling in his ankles and wrists. Patient is morbidly obese and has gone through a major cardiac evaluation with negative results recently and complains consistently of the increasing exertional dyspnea. While on vacation over the past several weeks he noted some wrist and typically ankle swelling that seemed to increase his weight he states is also increasing despite no change in his diet he is treated for hypertension multiple medications including amlodipine there has been no chest pain associated with this and he underwent a normal nuclear stress test not long ago

## 2020-01-31 NOTE — REVIEW OF SYSTEMS
[Fever] : no fever [Chills] : no chills [Night Sweats] : no night sweats [Vision Problems] : no vision problems [Sore Throat] : no sore throat [Chest Pain] : no chest pain [Palpitations] : no palpitations [Orthopena] : no orthopnea [Paroxysmal Nocturnal Dyspnea] : no paroxysmal nocturnal dyspnea [Shortness Of Breath] : shortness of breath [Wheezing] : no wheezing [Cough] : no cough [Dyspnea on Exertion] : dyspnea on exertion [Abdominal Pain] : no abdominal pain [Nausea] : no nausea [Constipation] : no constipation [Vomiting] : no vomiting [Heartburn] : no heartburn [Dysuria] : no dysuria [Incontinence] : no incontinence [Hematuria] : no hematuria [Frequency] : no frequency [Joint Pain] : no joint pain [Joint Stiffness] : no joint stiffness [Back Pain] : no back pain [Joint Swelling] : no joint swelling [Headache] : no headache [Dizziness] : no dizziness [Confusion] : confusion [Unsteady Walk] : no ataxia [Memory Loss] : no memory loss

## 2020-02-03 ENCOUNTER — APPOINTMENT (OUTPATIENT)
Dept: FAMILY MEDICINE | Facility: CLINIC | Age: 75
End: 2020-02-03
Payer: MEDICARE

## 2020-02-03 VITALS
HEIGHT: 68 IN | SYSTOLIC BLOOD PRESSURE: 140 MMHG | HEART RATE: 75 BPM | RESPIRATION RATE: 16 BRPM | OXYGEN SATURATION: 95 % | BODY MASS INDEX: 40.92 KG/M2 | WEIGHT: 270 LBS | DIASTOLIC BLOOD PRESSURE: 70 MMHG

## 2020-02-03 LAB
ALBUMIN SERPL ELPH-MCNC: 4.5 G/DL
ALP BLD-CCNC: 76 U/L
ALT SERPL-CCNC: 23 U/L
ANION GAP SERPL CALC-SCNC: 14 MMOL/L
APPEARANCE: CLEAR
AST SERPL-CCNC: 23 U/L
BASOPHILS # BLD AUTO: 0.03 K/UL
BASOPHILS NFR BLD AUTO: 0.4 %
BILIRUB SERPL-MCNC: 0.2 MG/DL
BILIRUBIN URINE: NEGATIVE
BLOOD URINE: NEGATIVE
BUN SERPL-MCNC: 34 MG/DL
CALCIUM SERPL-MCNC: 9.7 MG/DL
CHLORIDE SERPL-SCNC: 112 MMOL/L
CHOLEST SERPL-MCNC: 186 MG/DL
CHOLEST/HDLC SERPL: 3.7 RATIO
CO2 SERPL-SCNC: 19 MMOL/L
COLOR: YELLOW
CREAT SERPL-MCNC: 1.46 MG/DL
EOSINOPHIL # BLD AUTO: 0.14 K/UL
EOSINOPHIL NFR BLD AUTO: 1.8 %
ESTIMATED AVERAGE GLUCOSE: 103 MG/DL
GLUCOSE QUALITATIVE U: NEGATIVE
GLUCOSE SERPL-MCNC: 94 MG/DL
HBA1C MFR BLD HPLC: 5.2 %
HCT VFR BLD CALC: 35.9 %
HDLC SERPL-MCNC: 51 MG/DL
HGB BLD-MCNC: 11 G/DL
IMM GRANULOCYTES NFR BLD AUTO: 0.6 %
KETONES URINE: NEGATIVE
LDLC SERPL CALC-MCNC: 93 MG/DL
LEUKOCYTE ESTERASE URINE: NEGATIVE
LYMPHOCYTES # BLD AUTO: 1.94 K/UL
LYMPHOCYTES NFR BLD AUTO: 24.6 %
MAN DIFF?: NORMAL
MCHC RBC-ENTMCNC: 29.3 PG
MCHC RBC-ENTMCNC: 30.6 GM/DL
MCV RBC AUTO: 95.5 FL
MONOCYTES # BLD AUTO: 0.77 K/UL
MONOCYTES NFR BLD AUTO: 9.8 %
NEUTROPHILS # BLD AUTO: 4.96 K/UL
NEUTROPHILS NFR BLD AUTO: 62.8 %
NITRITE URINE: NEGATIVE
NT-PROBNP SERPL-MCNC: 210 PG/ML
PH URINE: 5.5
PLATELET # BLD AUTO: 196 K/UL
POTASSIUM SERPL-SCNC: 5.5 MMOL/L
PROT SERPL-MCNC: 6.8 G/DL
PROTEIN URINE: NORMAL
RBC # BLD: 3.76 M/UL
RBC # FLD: 14.5 %
SODIUM SERPL-SCNC: 146 MMOL/L
SPECIFIC GRAVITY URINE: 1.02
TRIGL SERPL-MCNC: 216 MG/DL
TSH SERPL-ACNC: 3.31 UIU/ML
URATE SERPL-MCNC: 8.6 MG/DL
UROBILINOGEN URINE: NORMAL
WBC # FLD AUTO: 7.89 K/UL

## 2020-02-03 PROCEDURE — 99214 OFFICE O/P EST MOD 30 MIN: CPT

## 2020-02-03 NOTE — ASSESSMENT
[FreeTextEntry1] : The patient has morbid obesity chronic kidney disease stage III and hypertension and impaired fasting glucose he has so far had no adverse affect from discontinuing amlodipine as his blood pressure was 140/80 today his lungs are actually clear . We have spoken with Dr. Thang Barbour regarding a nephrology evaluation and he will be seeing the patient on an expedited basis medical records hadn't prepared and transmitted to Dr. Barbour follow up here after visit with nephrology we had a long conversation with the patient regarding the nature of chronic kidney disease diabetes impaired fasting glucose hypertension and dyspnea of note is that he had a fairly thorough cardiac evaluation recently that revealed no ischemic or myopathic issues

## 2020-02-03 NOTE — HISTORY OF PRESENT ILLNESS
[FreeTextEntry1] : Exertional dyspnea , CKD [de-identified] : The patient is here for followup on his exertional dyspnea and recent laboratory work which showed her creatinine to be elevated and for his GFR to be in the last 3 a chronic kidney disease category on his last visit he was found to have edema of the ankles and also some rales at the bases of his lungs his amlodipine 10 mg was stopped and hydrochlorothiazide prescribed but then subsequently discontinued once his CK-MB was evident on his laboratory tests since that time he clinically feels unchanged chest x-ray was normal laboratory also revealed a anemia with a hemoglobin of 11 and a potassium of 5.5 we have contacted nephrology for consultation

## 2020-02-03 NOTE — PHYSICAL EXAM
[No Acute Distress] : no acute distress [Well Nourished] : well nourished [Well Developed] : well developed [Well-Appearing] : well-appearing [PERRL] : pupils equal round and reactive to light [Normal Sclera/Conjunctiva] : normal sclera/conjunctiva [Normal Outer Ear/Nose] : the outer ears and nose were normal in appearance [Normal Oropharynx] : the oropharynx was normal [No JVD] : no jugular venous distention [No Respiratory Distress] : no respiratory distress  [No Accessory Muscle Use] : no accessory muscle use [No Lymphadenopathy] : no lymphadenopathy [Normal Rate] : normal rate  [Clear to Auscultation] : lungs were clear to auscultation bilaterally [Regular Rhythm] : with a regular rhythm [No Murmur] : no murmur heard [Soft] : abdomen soft [No HSM] : no HSM [Non Tender] : non-tender [Normal Bowel Sounds] : normal bowel sounds [Normal Supraclavicular Nodes] : no supraclavicular lymphadenopathy [No Spinal Tenderness] : no spinal tenderness [de-identified] : Morbid obesity [de-identified] : 1+ edema about the ankles

## 2020-02-03 NOTE — REVIEW OF SYSTEMS
[Chest Pain] : no chest pain [Palpitations] : no palpitations [Lower Ext Edema] : lower extremity edema [Paroxysmal Nocturnal Dyspnea] : no paroxysmal nocturnal dyspnea [Orthopena] : no orthopnea [Shortness Of Breath] : shortness of breath [Dyspnea on Exertion] : dyspnea on exertion [Wheezing] : no wheezing [Cough] : no cough [Negative] : Musculoskeletal

## 2020-02-07 ENCOUNTER — APPOINTMENT (OUTPATIENT)
Dept: ULTRASOUND IMAGING | Facility: CLINIC | Age: 75
End: 2020-02-07
Payer: MEDICARE

## 2020-02-07 ENCOUNTER — APPOINTMENT (OUTPATIENT)
Dept: PULMONOLOGY | Facility: CLINIC | Age: 75
End: 2020-02-07
Payer: MEDICARE

## 2020-02-07 ENCOUNTER — OUTPATIENT (OUTPATIENT)
Dept: OUTPATIENT SERVICES | Facility: HOSPITAL | Age: 75
LOS: 1 days | End: 2020-02-07
Payer: MEDICARE

## 2020-02-07 VITALS
HEART RATE: 86 BPM | OXYGEN SATURATION: 95 % | RESPIRATION RATE: 18 BRPM | DIASTOLIC BLOOD PRESSURE: 70 MMHG | SYSTOLIC BLOOD PRESSURE: 128 MMHG | TEMPERATURE: 98.7 F | WEIGHT: 264 LBS | HEIGHT: 68 IN | BODY MASS INDEX: 40.01 KG/M2

## 2020-02-07 DIAGNOSIS — Z87.891 PERSONAL HISTORY OF NICOTINE DEPENDENCE: ICD-10-CM

## 2020-02-07 DIAGNOSIS — N18.3 CHRONIC KIDNEY DISEASE, STAGE 3 (MODERATE): ICD-10-CM

## 2020-02-07 LAB — DEPRECATED D DIMER PPP IA-ACNC: 266 NG/ML DDU

## 2020-02-07 PROCEDURE — 99213 OFFICE O/P EST LOW 20 MIN: CPT

## 2020-02-07 PROCEDURE — 76770 US EXAM ABDO BACK WALL COMP: CPT

## 2020-02-07 PROCEDURE — 76770 US EXAM ABDO BACK WALL COMP: CPT | Mod: 26

## 2020-02-07 NOTE — REVIEW OF SYSTEMS
[Dyspnea] : dyspnea [Chronic Pain] : chronic pain [TextBox_44] : hypertension [Negative] : Endocrine [TextBox_91] : chronic back pain

## 2020-02-07 NOTE — PHYSICAL EXAM
[Normal Oropharynx] : normal oropharynx [No Acute Distress] : no acute distress [No Neck Mass] : no neck mass [Normal Appearance] : normal appearance [Normal Rate/Rhythm] : normal rate/rhythm [No Murmurs] : no murmurs [Normal S1, S2] : normal s1, s2 [No Resp Distress] : no resp distress [Clear to Auscultation Bilaterally] : clear to auscultation bilaterally [No Abnormalities] : no abnormalities [Normal Gait] : normal gait [Benign] : benign [No Clubbing] : no clubbing [No Cyanosis] : no cyanosis [No Edema] : no edema [FROM] : FROM [Normal Color/ Pigmentation] : normal color/ pigmentation [No Focal Deficits] : no focal deficits [Oriented x3] : oriented x3 [Normal Affect] : normal affect

## 2020-02-07 NOTE — REASON FOR VISIT
[Initial] : an initial visit [Shortness of Breath] : shortness of breath [Sleep Apnea] : sleep apnea

## 2020-02-21 ENCOUNTER — OUTPATIENT (OUTPATIENT)
Dept: OUTPATIENT SERVICES | Facility: HOSPITAL | Age: 75
LOS: 1 days | End: 2020-02-21
Payer: MEDICARE

## 2020-02-21 DIAGNOSIS — R06.09 OTHER FORMS OF DYSPNEA: ICD-10-CM

## 2020-02-21 PROCEDURE — 94060 EVALUATION OF WHEEZING: CPT | Mod: 26

## 2020-02-21 PROCEDURE — 94726 PLETHYSMOGRAPHY LUNG VOLUMES: CPT

## 2020-02-21 PROCEDURE — 94729 DIFFUSING CAPACITY: CPT

## 2020-02-21 PROCEDURE — 94060 EVALUATION OF WHEEZING: CPT

## 2020-02-21 PROCEDURE — 94640 AIRWAY INHALATION TREATMENT: CPT

## 2020-02-21 PROCEDURE — 94726 PLETHYSMOGRAPHY LUNG VOLUMES: CPT | Mod: 26

## 2020-02-21 PROCEDURE — 94729 DIFFUSING CAPACITY: CPT | Mod: 26

## 2020-02-21 RX ORDER — ALBUTEROL 90 UG/1
2.5 AEROSOL, METERED ORAL ONCE
Refills: 0 | Status: COMPLETED | OUTPATIENT
Start: 2020-02-21 | End: 2020-02-21

## 2020-02-21 RX ADMIN — ALBUTEROL 2.5 MILLIGRAM(S): 90 AEROSOL, METERED ORAL at 09:04

## 2020-02-23 ENCOUNTER — RX RENEWAL (OUTPATIENT)
Age: 75
End: 2020-02-23

## 2020-02-25 ENCOUNTER — RX CHANGE (OUTPATIENT)
Age: 75
End: 2020-02-25

## 2020-02-26 LAB
BUN SERPL-MCNC: 27
BUN SERPL-MCNC: 29
CHLORIDE ?TM UR-SCNC: 105
CHLORIDE ?TM UR-SCNC: 108
CO2 SERPL-SCNC: 20
CREAT SPEC-SCNC: 1.54
CREAT UR-MCNC: 1.58
POTASSIUM FLD-SCNC: 4.7
SODIUM SERPL-SCNC: 139
URATE AMN-MCNC: NORMAL

## 2020-03-02 ENCOUNTER — APPOINTMENT (OUTPATIENT)
Age: 75
End: 2020-03-02
Payer: MEDICARE

## 2020-03-02 VITALS
HEIGHT: 68 IN | TEMPERATURE: 98.5 F | SYSTOLIC BLOOD PRESSURE: 117 MMHG | HEART RATE: 92 BPM | BODY MASS INDEX: 38.49 KG/M2 | DIASTOLIC BLOOD PRESSURE: 64 MMHG | WEIGHT: 254 LBS

## 2020-03-02 DIAGNOSIS — M16.11 UNILATERAL PRIMARY OSTEOARTHRITIS, RIGHT HIP: ICD-10-CM

## 2020-03-02 DIAGNOSIS — Z87.39 PERSONAL HISTORY OF OTHER DISEASES OF THE MUSCULOSKELETAL SYSTEM AND CONNECTIVE TISSUE: ICD-10-CM

## 2020-03-02 PROCEDURE — 73522 X-RAY EXAM HIPS BI 3-4 VIEWS: CPT

## 2020-03-02 PROCEDURE — 99214 OFFICE O/P EST MOD 30 MIN: CPT | Mod: 25

## 2020-03-02 PROCEDURE — 20610 DRAIN/INJ JOINT/BURSA W/O US: CPT | Mod: LT

## 2020-03-05 NOTE — ADDENDUM
[FreeTextEntry1] : This note was written by Andreas Cavanaugh on 03/03/2020, acting as a scribe for Sunny Carballo III, MD

## 2020-03-05 NOTE — PROCEDURE
[de-identified] : Consent: \par At this time, I have recommended an injection to the left hip.  The risks and benefits of the procedure were discussed with the patient in detail.  Upon verbal consent of the patient, we proceeded with the injection as noted below.  \par \par Procedure:  \par After a sterile prep, the patient underwent an injection of 9 cc of 1% Lidocaine without epinephrine and 1 cc of Kenalog into the left hip.  The patient tolerated the procedure well.  There were no complications.  \par \par

## 2020-03-05 NOTE — PHYSICAL EXAM
[de-identified] : Right Hip: Range of Motion in Degrees:\par 	                                  Claimant:	Normal:	\par Flexion (Active) 	                  120 	                120-degrees	\par Flexion (Passive)	                  120	                120-degrees	\par Extension (Active)	                  -30	                -30-degrees	\par Extension (Passive)	  -30	                -30-degrees	\par Abduction (Active)	                  45-50	                33-94-deeochj	\par Abduction (Passive)	  45-50	                66-96-zizdasu	\par Adduction (Active)	                  20-30	                34-68-frmirbh	\par Adduction (Passive)	  20-30	                43-27-vtrzxqc	\par Internal Rotation (Active) 	 10	                35-degrees	\par Internal Rotation (Passive)	 10	                35-degrees	\par External Rotation (Active)	 45	                45-degrees	\par External Rotation (Passive)	 45	                45-degrees	\par \par Tenderness into the groin with internal and external rotation and axial load.  Point tenderness over the greater trochanter with pain with resisted abduction.  Negative Trendelenburg.  No tenderness with resisted abduction.  No weakness to flexion, extension, abduction or adduction.  No evidence of instability.  No motor or sensory deficits.  2+ DP and PT pulses.  Skin is intact.  No scars, rashes or lesions.\par  \par Left Hip: Range of Motion in Degrees:\par 	                                  Claimant:	Normal:	\par Flexion (Active) 	                  120 	                120-degrees	\par Flexion (Passive)	                  120	                120-degrees	\par Extension (Active)	                  -30	                -30-degrees	\par Extension (Passive)	  -30	                -30-degrees	\par Abduction (Active)	                  45-50	                54-54-hmpubmv	\par Abduction (Passive)	  45-50	                42-23-nsblenb	\par Adduction (Active)	                  20-30	                48-78-ozdelcb	\par Adduction (Passive)	  20-30	                19-30-kvlgdqf	\par Internal Rotation (Active) 	 10	                35-degrees	\par Internal Rotation (Passive)	 10	                35-degrees	\par External Rotation (Active)	 45	                45-degrees	\par External Rotation (Passive)	 45	                45-degrees	\par \par Tenderness into the groin with internal and external rotation and axial load.  Point tenderness over the greater trochanter with pain with resisted abduction.  Negative Trendelenburg.  No tenderness with resisted abduction.  No weakness to flexion, extension, abduction or adduction.  No evidence of instability.  No motor or sensory deficits.  2+ DP and PT pulses.  Skin is intact.  No scars, rashes or lesions.\par   [de-identified] : Gait and Station:  Ambulating with a slightly antalgic to antalgic gait.  Normal Station.  [de-identified] : Appearance:  Well developed, well-nourished male in no acute distress.\par   [de-identified] : Radiographs, two to three views of the right hip and pelvis, show moderate degenerative changes.\par \par Radiographs, two to three views of the left hip and pelvis, show moderate degenerative changes.\par

## 2020-03-05 NOTE — HISTORY OF PRESENT ILLNESS
Patient will need to schedule an appointment to establish with me. Then I can be her doctor. I have not met her yet. [6] : a current pain level of 6/10 [Walking] : worsened by walking [Rest] : relieved by rest [de-identified] : The patient comes in today with complaints of pain to his bilateral hips.  He points to the lateral aspect as the source of the pain.  This injury is not work related or due to an automobile accident.  The patient states the pain is sharp.

## 2020-03-10 ENCOUNTER — APPOINTMENT (OUTPATIENT)
Dept: ORTHOPEDIC SURGERY | Facility: CLINIC | Age: 75
End: 2020-03-10
Payer: MEDICARE

## 2020-03-10 PROCEDURE — 20610 DRAIN/INJ JOINT/BURSA W/O US: CPT | Mod: RT

## 2020-03-10 PROCEDURE — 99213 OFFICE O/P EST LOW 20 MIN: CPT | Mod: 25

## 2020-03-11 ENCOUNTER — FORM ENCOUNTER (OUTPATIENT)
Age: 75
End: 2020-03-11

## 2020-03-11 ENCOUNTER — APPOINTMENT (OUTPATIENT)
Dept: PULMONOLOGY | Facility: CLINIC | Age: 75
End: 2020-03-11
Payer: MEDICARE

## 2020-03-11 VITALS
OXYGEN SATURATION: 96 % | DIASTOLIC BLOOD PRESSURE: 60 MMHG | BODY MASS INDEX: 38.95 KG/M2 | TEMPERATURE: 99 F | WEIGHT: 257 LBS | RESPIRATION RATE: 18 BRPM | HEART RATE: 81 BPM | HEIGHT: 68 IN | SYSTOLIC BLOOD PRESSURE: 160 MMHG

## 2020-03-11 PROCEDURE — 99213 OFFICE O/P EST LOW 20 MIN: CPT

## 2020-03-11 NOTE — CURRENT MEDS
[Takes medication as prescribed] : does not take [Lack of understanding] : lack of understanding [FreeTextEntry1] : reviewed with patient. He understands he now has to take it one puff once daily

## 2020-03-11 NOTE — REVIEW OF SYSTEMS
[Dyspnea] : dyspnea [Chronic Pain] : chronic pain [Negative] : Endocrine [TextBox_44] : hypertension [TextBox_91] : chronic back pain

## 2020-03-11 NOTE — DISCUSSION/SUMMARY
[de-identified] : The patient was given a cortisone injection for the right hip trochanteric bursitis, as noted above.  He was advised to ice it and return to the office in two weeks.

## 2020-03-11 NOTE — PROCEDURE
[de-identified] : Consent: \par At this time, I have recommended an injection to the right hip.  The risks and benefits of the procedure were discussed with the patient in detail.  Upon verbal consent of the patient, we proceeded with the injection as noted below.  \par \par Procedure:  \par After a sterile prep, the patient underwent an injection of 9 cc of 1% Lidocaine without epinephrine and 1 cc of Kenalog into the right hip.  The patient tolerated the procedure well.  There were no complications.

## 2020-03-11 NOTE — ADDENDUM
[FreeTextEntry1] : This note was written by Laurie Page on 03/11/2020 acting as scribe for Erlinda Mena, DAVID/L, PA

## 2020-03-11 NOTE — PHYSICAL EXAM
[de-identified] : Right Hip:\par Range of Motion in Degrees:\par 	                                 Claimant:	          Normal:	\par Flexion (Active) 	                 120 	          120-degrees	\par Flexion (Passive)	                 120	          120-degrees	\par Extension (Active)	                 -30	          -30-degrees	\par Extension (Passive)	 -30	          -30-degrees	\par Abduction (Active)	                45-50	          82-31-kpybfuz	\par Abduction (Passive)	45-50	          91-70-kbzhnvm	\par Adduction (Active)                	20-30	          82-80-bvzpgsl	\par Adduction (Passive)	20-30	          38-69-cfebspx	\par Internal Rotation (Active) 	35	          35-degrees	\par Internal Rotation (Passive)	35	          35-degrees	\par External Rotation (Active)	45	          45-degrees	\par External Rotation (Passive)	45	          45-degrees	\par \par No tenderness with internal or external rotation or axial load.  Point tenderness over the greater trochanter with pain with resisted abduction.  Negative Trendelenburg.  No weakness to flexion, extension, abduction or adduction.  No evidence of instability.  No motor or sensory deficits.  2+ DP and PT pulses.  Skin is intact.  No scars, rashes or lesions.  \par   [de-identified] : Ambulating with a slightly antalgic to antalgic gait.  Station:  Normal.  [de-identified] : General Appearance:  Well-developed, well-nourished male in no acute distress.

## 2020-03-12 ENCOUNTER — APPOINTMENT (OUTPATIENT)
Dept: CT IMAGING | Facility: CLINIC | Age: 75
End: 2020-03-12
Payer: MEDICARE

## 2020-03-12 ENCOUNTER — OUTPATIENT (OUTPATIENT)
Dept: OUTPATIENT SERVICES | Facility: HOSPITAL | Age: 75
LOS: 1 days | End: 2020-03-12
Payer: MEDICARE

## 2020-03-12 DIAGNOSIS — Z00.8 ENCOUNTER FOR OTHER GENERAL EXAMINATION: ICD-10-CM

## 2020-03-12 PROCEDURE — 71250 CT THORAX DX C-: CPT

## 2020-03-12 PROCEDURE — 71250 CT THORAX DX C-: CPT | Mod: 26

## 2020-04-15 ENCOUNTER — APPOINTMENT (OUTPATIENT)
Dept: PULMONOLOGY | Facility: CLINIC | Age: 75
End: 2020-04-15

## 2020-06-17 ENCOUNTER — APPOINTMENT (OUTPATIENT)
Dept: ORTHOPEDIC SURGERY | Facility: CLINIC | Age: 75
End: 2020-06-17
Payer: MEDICARE

## 2020-06-17 VITALS
HEIGHT: 68 IN | WEIGHT: 250 LBS | BODY MASS INDEX: 37.89 KG/M2 | SYSTOLIC BLOOD PRESSURE: 165 MMHG | DIASTOLIC BLOOD PRESSURE: 78 MMHG | TEMPERATURE: 99.1 F | HEART RATE: 79 BPM

## 2020-06-17 DIAGNOSIS — M16.12 UNILATERAL PRIMARY OSTEOARTHRITIS, LEFT HIP: ICD-10-CM

## 2020-06-17 PROCEDURE — 20610 DRAIN/INJ JOINT/BURSA W/O US: CPT | Mod: LT

## 2020-06-17 PROCEDURE — 73502 X-RAY EXAM HIP UNI 2-3 VIEWS: CPT | Mod: LT

## 2020-06-17 PROCEDURE — 99214 OFFICE O/P EST MOD 30 MIN: CPT | Mod: 25

## 2020-06-22 ENCOUNTER — RESULT REVIEW (OUTPATIENT)
Age: 75
End: 2020-06-22

## 2020-06-22 ENCOUNTER — APPOINTMENT (OUTPATIENT)
Dept: RADIOLOGY | Facility: CLINIC | Age: 75
End: 2020-06-22
Payer: MEDICARE

## 2020-06-22 ENCOUNTER — OUTPATIENT (OUTPATIENT)
Dept: OUTPATIENT SERVICES | Facility: HOSPITAL | Age: 75
LOS: 1 days | End: 2020-06-22
Payer: MEDICARE

## 2020-06-22 DIAGNOSIS — M16.12 UNILATERAL PRIMARY OSTEOARTHRITIS, LEFT HIP: ICD-10-CM

## 2020-06-22 PROCEDURE — 27093 INJECTION FOR HIP X-RAY: CPT | Mod: LT

## 2020-06-22 PROCEDURE — 27093 INJECTION FOR HIP X-RAY: CPT

## 2020-06-22 PROCEDURE — 73525 CONTRAST X-RAY OF HIP: CPT

## 2020-06-22 PROCEDURE — 73525 CONTRAST X-RAY OF HIP: CPT | Mod: 26,LT

## 2020-06-22 NOTE — HISTORY OF PRESENT ILLNESS
[de-identified] : The patient comes in today with some persistent complaints, more so to the outside of the left hip than into the groin.  \par \par

## 2020-06-22 NOTE — ADDENDUM
[FreeTextEntry1] : This note was written by Andreas Cavanaugh on 06/22/2020, acting as a scribe for Sunny Carballo III, MD

## 2020-06-22 NOTE — DISCUSSION/SUMMARY
[de-identified] : At this time, I recommended ice and elevation.  He will be reassessed in 3-4 weeks for the left hip arthritis and trochanteric bursitis.

## 2020-06-22 NOTE — PHYSICAL EXAM
[de-identified] : Left Hip: Range of Motion in Degrees:\par 	                                  Claimant:	Normal:	\par Flexion (Active) 	                  120 	                120-degrees	\par Flexion (Passive)	                  120	                120-degrees	\par Extension (Active)	                  -30	                -30-degrees	\par Extension (Passive)	  -30	                -30-degrees	\par Abduction (Active)	                  45-50	                13-74-vsdseto	\par Abduction (Passive)	  45-50	                16-97-wfktrpf	\par Adduction (Active)	                  20-30	                21-60-vvbenkr	\par Adduction (Passive)	  20-30	                29-57-xaunhpr	\par Internal Rotation (Active) 	 35	                35-degrees	\par Internal Rotation (Passive)	 35	                35-degrees	\par External Rotation (Active)	 45	                45-degrees	\par External Rotation (Passive)	 45	                45-degrees	\par \par Tenderness into the groin with internal and external rotation and axial load.  Point tenderness over the greater trochanter with pain with resisted abduction.  Negative Trendelenburg.  No tenderness with resisted abduction.  No weakness to flexion, extension, abduction or adduction.  No evidence of instability.  No motor or sensory deficits.  2+ DP and PT pulses.  Skin is intact.  No scars, rashes or lesions.\par   [de-identified] : Gait and Station:  Ambulating with a slightly antalgic to antalgic gait.  Normal Station.  [de-identified] : Appearance:  Well developed, well-nourished male in no acute distress.\par   [de-identified] : Radiographs, two to three views of the left hip and pelvis, show mild degenerative changes.\par

## 2020-06-22 NOTE — PROCEDURE
[de-identified] : Consent: \par At this time, I have recommended an injection to the left hip.  The risks and benefits of the procedure were discussed with the patient in detail.  Upon verbal consent of the patient, we proceeded with the injection as noted below.  \par \par Procedure:  \par After a sterile prep, the patient underwent an injection of 9 cc of 1% Lidocaine without epinephrine and 1 cc of Kenalog into the left hip.  The patient tolerated the procedure well.  There were no complications.  \par \par

## 2020-06-29 ENCOUNTER — APPOINTMENT (OUTPATIENT)
Dept: FAMILY MEDICINE | Facility: CLINIC | Age: 75
End: 2020-06-29
Payer: MEDICARE

## 2020-06-29 VITALS
TEMPERATURE: 98.4 F | SYSTOLIC BLOOD PRESSURE: 150 MMHG | BODY MASS INDEX: 40.62 KG/M2 | HEIGHT: 68 IN | OXYGEN SATURATION: 100 % | HEART RATE: 84 BPM | DIASTOLIC BLOOD PRESSURE: 60 MMHG | RESPIRATION RATE: 16 BRPM | WEIGHT: 268 LBS

## 2020-06-29 DIAGNOSIS — Z00.00 ENCOUNTER FOR GENERAL ADULT MEDICAL EXAMINATION W/OUT ABNORMAL FINDINGS: ICD-10-CM

## 2020-06-29 DIAGNOSIS — R60.9 EDEMA, UNSPECIFIED: ICD-10-CM

## 2020-06-29 PROCEDURE — G0439: CPT

## 2020-06-29 PROCEDURE — 99213 OFFICE O/P EST LOW 20 MIN: CPT

## 2020-06-29 RX ORDER — HYDROCHLOROTHIAZIDE 25 MG/1
25 TABLET ORAL DAILY
Qty: 30 | Refills: 0 | Status: DISCONTINUED | COMMUNITY
Start: 2020-01-31 | End: 2020-06-29

## 2020-06-29 RX ORDER — DULAGLUTIDE 1.5 MG/.5ML
1.5 INJECTION, SOLUTION SUBCUTANEOUS
Qty: 4 | Refills: 5 | Status: DISCONTINUED | COMMUNITY
Start: 2019-04-10 | End: 2020-06-29

## 2020-06-29 RX ORDER — FLUTICASONE FUROATE AND VILANTEROL TRIFENATATE 100; 25 UG/1; UG/1
100-25 POWDER RESPIRATORY (INHALATION)
Qty: 1 | Refills: 3 | Status: DISCONTINUED | COMMUNITY
Start: 2020-02-07 | End: 2020-06-29

## 2020-06-29 NOTE — HEALTH RISK ASSESSMENT
[Good] : ~his/her~  mood as  good [Yes] : Yes [1 or 2 (0 pts)] : 1 or 2 (0 points) [2 - 4 times a month (2 pts)] : 2-4 times a month (2 points) [No] : In the past 12 months have you used drugs other than those required for medical reasons? No [No falls in past year] : Patient reported no falls in the past year [0] : 1) Little interest or pleasure doing things: Not at all (0) [HIV test declined] : HIV test declined [Hepatitis C test declined] : Hepatitis C test declined [None] : None [With Significant Other] : lives with significant other [# of Members in Household ___] :  household currently consist of [unfilled] member(s) [Employed] : employed [Graduate School] : graduate school [] :  [# Of Children ___] : has [unfilled] children [Feels Safe at Home] : Feels safe at home [Sexually Active] : sexually active [Fully functional (bathing, dressing, toileting, transferring, walking, feeding)] : Fully functional (bathing, dressing, toileting, transferring, walking, feeding) [Fully functional (using the telephone, shopping, preparing meals, housekeeping, doing laundry, using] : Fully functional and needs no help or supervision to perform IADLs (using the telephone, shopping, preparing meals, housekeeping, doing laundry, using transportation, managing medications and managing finances) [Smoke Detector] : smoke detector [Carbon Monoxide Detector] : carbon monoxide detector [Seat Belt] :  uses seat belt [Guns at Home] : guns at home [With Patient/Caregiver] : With Patient/Caregiver [Name: ___] : Health Care Proxy's Name: [unfilled]  [Designated Healthcare Proxy] : Designated healthcare proxy [Relationship: ___] : Relationship: [unfilled] [I will adhere to the patient's wishes as expressed in the advance directive except as noted below.] : I will adhere to the patient's wishes as expressed in the advance directive except as noted below [] : No [FreeTextEntry1] : dyspnea, pain [de-identified] : nephrology, optalmology, cardiology, urology, pulmonary [de-identified] : limited [[id number]] : 0 [de-identified] : varied [Change in mental status noted] : No change in mental status noted [Language] : denies difficulty with language [Behavior] : denies difficulty with behavior [Handling Complex Tasks] : denies difficulty handling complex tasks [High Risk Behavior] : no high risk behavior [Reasoning] : denies difficulty with reasoning [Reports changes in hearing] : Reports no changes in hearing [Sunscreen] : does not use sunscreen [Reports changes in dental health] : Reports no changes in dental health [ColonoscopyDate] : 2018 [ColonoscopyComments] : polyp [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

## 2020-06-29 NOTE — PLAN
[FreeTextEntry1] : Cells noted that the patient had been complaining severely of exertional dyspnea and underwent a thorough cardiac and pulmonary evaluation which failed to reveal any meaningful abnormalities other than his sleep apnea this included stress testing in light function testing etc. much of this may also be due to his morbid obesity and deconditioning from his spinal issues

## 2020-06-29 NOTE — ASSESSMENT
[FreeTextEntry1] : Major health issues revolve around morbid obesity and multiple spinal operations along with metabolic syndrome and cancer of the bladder which is under active surveillance and treatment. Patient also has obstructive sleep apnea. He has seen the bariatric specialist and periodically lost small amounts of weight but no sustained weight loss. He did have an abnormal colonoscopy several years ago with the finding of polyps he had cataracts done last year he has been immunized for flu and pneumonia but he is not sure of the dates. He has chronic kidney disease for which she does see a nephrologist mental status is excellent will be encouraged to follow a diet to exercise as much as possible with his orthopedic limitations and to continue preventive care measures we'll obtain the dates of his immunizations and remind him to check with his endoscopist with regard to date for his next colonoscopy

## 2020-06-29 NOTE — REASON FOR VISIT
[Annual Wellness Visit] : an annual wellness visit [Medical Office: ([facility])___] : at the medical office located in  [Home] : at home, [unfilled] , at the time of the visit.

## 2020-06-29 NOTE — REVIEW OF SYSTEMS
[Fatigue] : fatigue [Shortness Of Breath] : shortness of breath [Dyspnea on Exertion] : dyspnea on exertion [Nocturia] : nocturia [Joint Pain] : joint pain [Joint Stiffness] : joint stiffness [Joint Swelling] : joint swelling [Fever] : no fever [Chills] : no chills [Night Sweats] : no night sweats [Vision Problems] : no vision problems [Earache] : no earache [Hearing Loss] : no hearing loss [Sore Throat] : no sore throat [Chest Pain] : no chest pain [Palpitations] : no palpitations [Claudication] : no  leg claudication [Lower Ext Edema] : no lower extremity edema [Orthopena] : no orthopnea [Paroxysmal Nocturnal Dyspnea] : no paroxysmal nocturnal dyspnea [Wheezing] : no wheezing [Cough] : no cough [Abdominal Pain] : no abdominal pain [Nausea] : no nausea [Constipation] : no constipation [Diarrhea] : no diarrhea [Vomiting] : no vomiting [Melena] : no melena [Dysuria] : no dysuria [Hesitancy] : no hesitancy [Hematuria] : no hematuria [Frequency] : no frequency [Dizziness] : no dizziness [Headache] : no headache [Confusion] : no confusion [Fainting] : no fainting [Unsteady Walk] : no ataxia [Insomnia] : no insomnia [Memory Loss] : no memory loss [Anxiety] : no anxiety [Easy Bleeding] : no easy bleeding [Depression] : no depression

## 2020-06-29 NOTE — PHYSICAL EXAM
[Normal Sclera/Conjunctiva] : normal sclera/conjunctiva [No Edema] : there was no peripheral edema [Soft] : abdomen soft [Non Tender] : non-tender [Normal Bowel Sounds] : normal bowel sounds [No Joint Swelling] : no joint swelling [Normal Gait] : normal gait [Normal] : affect was normal and insight and judgment were intact [Normal Mood] : the mood was normal

## 2020-07-08 LAB
ALBUMIN SERPL ELPH-MCNC: 4.5 G/DL
ALP BLD-CCNC: 58 U/L
ALT SERPL-CCNC: 21 U/L
ANION GAP SERPL CALC-SCNC: 15 MMOL/L
APPEARANCE: CLEAR
AST SERPL-CCNC: 20 U/L
BASOPHILS # BLD AUTO: 0.04 K/UL
BASOPHILS NFR BLD AUTO: 0.4 %
BILIRUB SERPL-MCNC: 0.3 MG/DL
BILIRUBIN URINE: NEGATIVE
BLOOD URINE: NEGATIVE
BUN SERPL-MCNC: 33 MG/DL
CALCIUM SERPL-MCNC: 9.4 MG/DL
CHLORIDE SERPL-SCNC: 104 MMOL/L
CHOLEST SERPL-MCNC: 206 MG/DL
CHOLEST/HDLC SERPL: 3.5 RATIO
CO2 SERPL-SCNC: 25 MMOL/L
COLOR: YELLOW
CREAT SERPL-MCNC: 1.5 MG/DL
EOSINOPHIL # BLD AUTO: 0.13 K/UL
EOSINOPHIL NFR BLD AUTO: 1.5 %
GLUCOSE QUALITATIVE U: NEGATIVE
GLUCOSE SERPL-MCNC: 99 MG/DL
HCT VFR BLD CALC: 38.7 %
HDLC SERPL-MCNC: 59 MG/DL
HGB BLD-MCNC: 11.7 G/DL
IMM GRANULOCYTES NFR BLD AUTO: 0.4 %
KETONES URINE: NEGATIVE
LDLC SERPL CALC-MCNC: 99 MG/DL
LEUKOCYTE ESTERASE URINE: NEGATIVE
LYMPHOCYTES # BLD AUTO: 2.13 K/UL
LYMPHOCYTES NFR BLD AUTO: 23.9 %
MAN DIFF?: NORMAL
MCHC RBC-ENTMCNC: 29.4 PG
MCHC RBC-ENTMCNC: 30.2 GM/DL
MCV RBC AUTO: 97.2 FL
MONOCYTES # BLD AUTO: 0.73 K/UL
MONOCYTES NFR BLD AUTO: 8.2 %
NEUTROPHILS # BLD AUTO: 5.84 K/UL
NEUTROPHILS NFR BLD AUTO: 65.6 %
NITRITE URINE: NEGATIVE
PH URINE: 5.5
PLATELET # BLD AUTO: 188 K/UL
POTASSIUM SERPL-SCNC: 5.2 MMOL/L
PROT SERPL-MCNC: 6.9 G/DL
PROTEIN URINE: NORMAL
PSA SERPL-MCNC: 0.63 NG/ML
RBC # BLD: 3.98 M/UL
RBC # FLD: 13.9 %
SODIUM SERPL-SCNC: 143 MMOL/L
SPECIFIC GRAVITY URINE: 1.02
TRIGL SERPL-MCNC: 240 MG/DL
URATE SERPL-MCNC: 8 MG/DL
UROBILINOGEN URINE: NORMAL
WBC # FLD AUTO: 8.91 K/UL

## 2020-07-09 ENCOUNTER — RX RENEWAL (OUTPATIENT)
Age: 75
End: 2020-07-09

## 2020-07-10 ENCOUNTER — RX RENEWAL (OUTPATIENT)
Age: 75
End: 2020-07-10

## 2020-07-15 LAB
ERYTHROCYTE [SEDIMENTATION RATE] IN BLOOD BY WESTERGREN METHOD: 70 MM/HR
ESTIMATED AVERAGE GLUCOSE: 111 MG/DL
HBA1C MFR BLD HPLC: 5.5 %

## 2020-08-18 ENCOUNTER — RX RENEWAL (OUTPATIENT)
Age: 75
End: 2020-08-18

## 2020-09-21 ENCOUNTER — RX RENEWAL (OUTPATIENT)
Age: 75
End: 2020-09-21

## 2020-09-24 ENCOUNTER — APPOINTMENT (OUTPATIENT)
Dept: FAMILY MEDICINE | Facility: CLINIC | Age: 75
End: 2020-09-24
Payer: MEDICARE

## 2020-09-24 PROCEDURE — G0008: CPT

## 2020-09-24 PROCEDURE — 90662 IIV NO PRSV INCREASED AG IM: CPT

## 2020-10-24 ENCOUNTER — RX RENEWAL (OUTPATIENT)
Age: 75
End: 2020-10-24

## 2020-11-18 ENCOUNTER — RX RENEWAL (OUTPATIENT)
Age: 75
End: 2020-11-18

## 2020-11-30 ENCOUNTER — RX RENEWAL (OUTPATIENT)
Age: 75
End: 2020-11-30

## 2020-12-21 PROBLEM — Z87.09 HISTORY OF ACUTE BRONCHITIS: Status: RESOLVED | Noted: 2019-04-28 | Resolved: 2020-12-21

## 2021-01-05 ENCOUNTER — RX RENEWAL (OUTPATIENT)
Age: 76
End: 2021-01-05

## 2021-01-08 ENCOUNTER — APPOINTMENT (OUTPATIENT)
Dept: FAMILY MEDICINE | Facility: CLINIC | Age: 76
End: 2021-01-08
Payer: MEDICARE

## 2021-01-08 ENCOUNTER — NON-APPOINTMENT (OUTPATIENT)
Age: 76
End: 2021-01-08

## 2021-01-08 VITALS
DIASTOLIC BLOOD PRESSURE: 68 MMHG | WEIGHT: 258 LBS | TEMPERATURE: 98.2 F | SYSTOLIC BLOOD PRESSURE: 120 MMHG | RESPIRATION RATE: 16 BRPM | HEIGHT: 68 IN | OXYGEN SATURATION: 98 % | BODY MASS INDEX: 39.1 KG/M2 | HEART RATE: 85 BPM

## 2021-01-08 PROCEDURE — 99214 OFFICE O/P EST MOD 30 MIN: CPT | Mod: 25

## 2021-01-08 PROCEDURE — 93000 ELECTROCARDIOGRAM COMPLETE: CPT

## 2021-01-08 NOTE — PHYSICAL EXAM
[No Acute Distress] : no acute distress [Well Developed] : well developed [Well-Appearing] : well-appearing [Normal Sclera/Conjunctiva] : normal sclera/conjunctiva [PERRL] : pupils equal round and reactive to light [Normal Outer Ear/Nose] : the outer ears and nose were normal in appearance [Normal Oropharynx] : the oropharynx was normal [No JVD] : no jugular venous distention [No Lymphadenopathy] : no lymphadenopathy [Thyroid Normal, No Nodules] : the thyroid was normal and there were no nodules present [No Respiratory Distress] : no respiratory distress  [No Accessory Muscle Use] : no accessory muscle use [Clear to Auscultation] : lungs were clear to auscultation bilaterally [Normal Rate] : normal rate  [Regular Rhythm] : with a regular rhythm [No Murmur] : no murmur heard [No Edema] : there was no peripheral edema [Soft] : abdomen soft [Non Tender] : non-tender [No Masses] : no abdominal mass palpated [No HSM] : no HSM [Normal Bowel Sounds] : normal bowel sounds [Normal Supraclavicular Nodes] : no supraclavicular lymphadenopathy [Normal Anterior Cervical Nodes] : no anterior cervical lymphadenopathy [No CVA Tenderness] : no CVA  tenderness [No Spinal Tenderness] : no spinal tenderness [No Joint Swelling] : no joint swelling [No Rash] : no rash [Coordination Grossly Intact] : coordination grossly intact [No Focal Deficits] : no focal deficits [de-identified] : Morbid obesity

## 2021-01-08 NOTE — HISTORY OF PRESENT ILLNESS
[FreeTextEntry1] : Recurrent gout attacks [de-identified] : Is here because of recurrent gout attacks over the past several months which have been responsive to steroid medications but then seemed to return his last 1 involve his right ankle and did respond to prednisone 10 mg several times a day over the course of a week or 10 days he currently feels well other issues include hypertension for which she is medicated with losartan and chlorthalidone and hyperlipidemia and morbid obesity severe low back issues also review of systems is unremarkable for cardiovascular symptomatology no systemic symptoms

## 2021-01-08 NOTE — ASSESSMENT
[FreeTextEntry1] : EKG today is unchanged from prior tracings with regard to the gout we have ordered the laboratory work once we are sure that his renal function is good we will prescribe colchicine prophylactically and allopurinol 100 mg daily also prophylactically we will discontinue his chlorthalidone and monitor his blood pressure carefully note is the patient had a normal renal sonogram last year no history of nephrolithiasis

## 2021-01-11 LAB
ALBUMIN SERPL ELPH-MCNC: 4.3 G/DL
ALP BLD-CCNC: 68 U/L
ALT SERPL-CCNC: 20 U/L
ANION GAP SERPL CALC-SCNC: 16 MMOL/L
AST SERPL-CCNC: 19 U/L
BASOPHILS # BLD AUTO: 0.04 K/UL
BASOPHILS NFR BLD AUTO: 0.4 %
BILIRUB SERPL-MCNC: 0.3 MG/DL
BUN SERPL-MCNC: 40 MG/DL
CALCIUM SERPL-MCNC: 10.1 MG/DL
CHLORIDE SERPL-SCNC: 108 MMOL/L
CHOLEST SERPL-MCNC: 206 MG/DL
CO2 SERPL-SCNC: 20 MMOL/L
CREAT SERPL-MCNC: 1.61 MG/DL
EOSINOPHIL # BLD AUTO: 0.17 K/UL
EOSINOPHIL NFR BLD AUTO: 1.9 %
ERYTHROCYTE [SEDIMENTATION RATE] IN BLOOD BY WESTERGREN METHOD: 47 MM/HR
ESTIMATED AVERAGE GLUCOSE: 117 MG/DL
FERRITIN SERPL-MCNC: 251 NG/ML
GLUCOSE SERPL-MCNC: 104 MG/DL
HBA1C MFR BLD HPLC: 5.7 %
HCT VFR BLD CALC: 38 %
HDLC SERPL-MCNC: 57 MG/DL
HGB BLD-MCNC: 11.8 G/DL
IMM GRANULOCYTES NFR BLD AUTO: 0.7 %
IRON SATN MFR SERPL: 21 %
IRON SERPL-MCNC: 77 UG/DL
LDLC SERPL CALC-MCNC: 112 MG/DL
LYMPHOCYTES # BLD AUTO: 1.75 K/UL
LYMPHOCYTES NFR BLD AUTO: 19.3 %
MAN DIFF?: NORMAL
MCHC RBC-ENTMCNC: 29.1 PG
MCHC RBC-ENTMCNC: 31.1 GM/DL
MCV RBC AUTO: 93.6 FL
MONOCYTES # BLD AUTO: 0.82 K/UL
MONOCYTES NFR BLD AUTO: 9.1 %
NEUTROPHILS # BLD AUTO: 6.21 K/UL
NEUTROPHILS NFR BLD AUTO: 68.6 %
NONHDLC SERPL-MCNC: 150 MG/DL
PLATELET # BLD AUTO: 208 K/UL
POTASSIUM SERPL-SCNC: 5.2 MMOL/L
PROT SERPL-MCNC: 7 G/DL
PSA SERPL-MCNC: 0.64 NG/ML
RBC # BLD: 4.06 M/UL
RBC # FLD: 14.1 %
SODIUM SERPL-SCNC: 144 MMOL/L
TIBC SERPL-MCNC: 363 UG/DL
TRIGL SERPL-MCNC: 188 MG/DL
UIBC SERPL-MCNC: 287 UG/DL
URATE SERPL-MCNC: 10.2 MG/DL
WBC # FLD AUTO: 9.05 K/UL

## 2021-02-24 ENCOUNTER — RX RENEWAL (OUTPATIENT)
Age: 76
End: 2021-02-24

## 2021-03-16 LAB
ALBUMIN SERPL ELPH-MCNC: 4.3 G/DL
ANION GAP SERPL CALC-SCNC: 11 MMOL/L
BASOPHILS # BLD AUTO: 0.04 K/UL
BASOPHILS NFR BLD AUTO: 0.6 %
BUN SERPL-MCNC: 38 MG/DL
CALCIUM SERPL-MCNC: 9.8 MG/DL
CHLORIDE SERPL-SCNC: 108 MMOL/L
CO2 SERPL-SCNC: 24 MMOL/L
CREAT SERPL-MCNC: 1.56 MG/DL
EOSINOPHIL # BLD AUTO: 0.22 K/UL
EOSINOPHIL NFR BLD AUTO: 3.3 %
ERYTHROCYTE [SEDIMENTATION RATE] IN BLOOD BY WESTERGREN METHOD: 34 MM/HR
GLUCOSE SERPL-MCNC: 109 MG/DL
HCT VFR BLD CALC: 36 %
HGB BLD-MCNC: 11.7 G/DL
IMM GRANULOCYTES NFR BLD AUTO: 0.5 %
LYMPHOCYTES # BLD AUTO: 1.69 K/UL
LYMPHOCYTES NFR BLD AUTO: 25.6 %
MAN DIFF?: NORMAL
MCHC RBC-ENTMCNC: 29.3 PG
MCHC RBC-ENTMCNC: 32.5 GM/DL
MCV RBC AUTO: 90.2 FL
MONOCYTES # BLD AUTO: 0.82 K/UL
MONOCYTES NFR BLD AUTO: 12.4 %
NEUTROPHILS # BLD AUTO: 3.79 K/UL
NEUTROPHILS NFR BLD AUTO: 57.6 %
PHOSPHATE SERPL-MCNC: 4.6 MG/DL
PLATELET # BLD AUTO: 195 K/UL
POTASSIUM SERPL-SCNC: 5 MMOL/L
RBC # BLD: 3.99 M/UL
RBC # FLD: 14.2 %
SODIUM SERPL-SCNC: 143 MMOL/L
URATE SERPL-MCNC: 9.3 MG/DL
WBC # FLD AUTO: 6.59 K/UL

## 2021-03-29 ENCOUNTER — APPOINTMENT (OUTPATIENT)
Dept: FAMILY MEDICINE | Facility: CLINIC | Age: 76
End: 2021-03-29
Payer: MEDICARE

## 2021-03-29 VITALS
BODY MASS INDEX: 39.6 KG/M2 | TEMPERATURE: 97.3 F | DIASTOLIC BLOOD PRESSURE: 70 MMHG | SYSTOLIC BLOOD PRESSURE: 162 MMHG | RESPIRATION RATE: 16 BRPM | HEIGHT: 68 IN | OXYGEN SATURATION: 96 % | HEART RATE: 77 BPM | WEIGHT: 261.31 LBS

## 2021-03-29 DIAGNOSIS — F41.9 ANXIETY DISORDER, UNSPECIFIED: ICD-10-CM

## 2021-03-29 DIAGNOSIS — R79.89 OTHER SPECIFIED ABNORMAL FINDINGS OF BLOOD CHEMISTRY: ICD-10-CM

## 2021-03-29 PROCEDURE — 99214 OFFICE O/P EST MOD 30 MIN: CPT

## 2021-03-29 RX ORDER — VITAMIN B COMPLEX
TABLET ORAL
Refills: 0 | Status: DISCONTINUED | COMMUNITY
End: 2021-03-29

## 2021-03-29 RX ORDER — MULTIVIT-MIN/IRON/FOLIC ACID/K 18-600-40
CAPSULE ORAL
Refills: 0 | Status: DISCONTINUED | COMMUNITY
End: 2021-03-29

## 2021-03-29 RX ORDER — PREDNISONE 10 MG/1
10 TABLET ORAL TWICE DAILY
Qty: 40 | Refills: 0 | Status: DISCONTINUED | COMMUNITY
Start: 2020-09-20 | End: 2021-03-29

## 2021-03-29 NOTE — PHYSICAL EXAM
[No Acute Distress] : no acute distress [Well Nourished] : well nourished [Normal Sclera/Conjunctiva] : normal sclera/conjunctiva [Normal Outer Ear/Nose] : the outer ears and nose were normal in appearance [Normal Oropharynx] : the oropharynx was normal [No JVD] : no jugular venous distention [No Lymphadenopathy] : no lymphadenopathy [Supple] : supple [No Respiratory Distress] : no respiratory distress  [No Accessory Muscle Use] : no accessory muscle use [Normal Rate] : normal rate  [Regular Rhythm] : with a regular rhythm [No Murmur] : no murmur heard [Soft] : abdomen soft [Non Tender] : non-tender [No HSM] : no HSM [Normal Bowel Sounds] : normal bowel sounds [Normal Supraclavicular Nodes] : no supraclavicular lymphadenopathy [Normal Posterior Cervical Nodes] : no posterior cervical lymphadenopathy [Normal Anterior Cervical Nodes] : no anterior cervical lymphadenopathy [No Rash] : no rash [Normal Gait] : normal gait [de-identified] : Morbid obesity

## 2021-03-29 NOTE — HISTORY OF PRESENT ILLNESS
[FreeTextEntry1] : Hypertension [de-identified] : Patient is seen here for follow-up on his hypertension he has been getting readings at home consistently in the 170s to 180s on systolic he also has been treated for elevated uric acid gout status post bladder CA morbid obesity.  Review of systems is otherwise unremarkable avoid does suffer from multiple arthritic complaints has had many back surgeries and takes occasional NSAIDs no fever no chills no chest pain he does have exertional dyspnea

## 2021-03-29 NOTE — REVIEW OF SYSTEMS
[Fever] : no fever [Chills] : no chills [Fatigue] : fatigue [Vision Problems] : no vision problems [Chest Pain] : no chest pain [Paroxysmal Nocturnal Dyspnea] : no paroxysmal nocturnal dyspnea [Shortness Of Breath] : no shortness of breath [Wheezing] : no wheezing [Cough] : no cough [Dyspnea on Exertion] : dyspnea on exertion [Abdominal Pain] : no abdominal pain [Nausea] : no nausea [Vomiting] : no vomiting [Heartburn] : no heartburn [Dysuria] : no dysuria [Incontinence] : no incontinence [Hematuria] : no hematuria [Frequency] : no frequency [Joint Pain] : no joint pain [Back Pain] : no back pain [Headache] : no headache [Dizziness] : no dizziness [Fainting] : no fainting [Unsteady Walk] : no ataxia [Memory Loss] : no memory loss

## 2021-03-29 NOTE — ASSESSMENT
[FreeTextEntry1] : Here with multiple problems including morbid obesity hypertension gout chronic kidney disease he has taken his medications but somewhat intermittently and has not been clear on what he may or may not be taking we have reviews his medication list with him extensively we have increased his losartan from 25 to 50 mg we have increased his allopurinol from half a pill to 1 pill a day he will continue on metoprolol and continue on chlorthalidone although we may consider altering this if his uric acid does not decrease.  He also takes tamsulosin for BHP and the bladder control he will check his blood pressure twice daily and record the results it is important that he take it resting today his blood pressure was 180/80 when first taken but after sitting with his feet on the ground and back supported for 5 minutes it came down to 150/70.  He will take his blood pressure only after resting in the seated position we will follow him up next week we will also follow his uric acid and renal profile at a early date he has seen nephrology and remains under the care of urology for his bladder issues time of visit 35 minutes

## 2021-04-05 ENCOUNTER — APPOINTMENT (OUTPATIENT)
Dept: FAMILY MEDICINE | Facility: CLINIC | Age: 76
End: 2021-04-05
Payer: MEDICARE

## 2021-04-05 VITALS
OXYGEN SATURATION: 95 % | DIASTOLIC BLOOD PRESSURE: 70 MMHG | SYSTOLIC BLOOD PRESSURE: 178 MMHG | TEMPERATURE: 97.8 F | BODY MASS INDEX: 39.56 KG/M2 | HEART RATE: 89 BPM | WEIGHT: 261 LBS | RESPIRATION RATE: 16 BRPM | HEIGHT: 68 IN

## 2021-04-05 PROCEDURE — 99213 OFFICE O/P EST LOW 20 MIN: CPT

## 2021-04-05 NOTE — ASSESSMENT
[FreeTextEntry1] : Doing well pressure today after the patient was seated in a quiet environment for about 5 minutes with his feet on the floor was 144/70 this is a significant improvement over last week we will continue him on the current dosage consider titrating of Lopressor a bit further after revisit in a week or 2

## 2021-04-05 NOTE — HISTORY OF PRESENT ILLNESS
[FreeTextEntry1] : Old hypertension obesity gout [de-identified] : Patient is here in follow-up on his hypertension at the time of last visit we rearrange his medications giving him losartan 50 mg a day HCTZ [place] rather in the morning also increased his allopurinol to 100 mg daily and that he is now on sertraline 25 mg soon to be 50 mg daily he feels well he has been checking his blood pressure at home and receiving systolics in the 1 50-1 40 range diastolics in the 70s and 80s review of systems is unremarkable

## 2021-04-05 NOTE — PHYSICAL EXAM
[No Acute Distress] : no acute distress [Well Nourished] : well nourished [Well Developed] : well developed [Well-Appearing] : well-appearing [Normal Sclera/Conjunctiva] : normal sclera/conjunctiva [PERRL] : pupils equal round and reactive to light [EOMI] : extraocular movements intact [Normal Outer Ear/Nose] : the outer ears and nose were normal in appearance [Normal Oropharynx] : the oropharynx was normal [No JVD] : no jugular venous distention [No Lymphadenopathy] : no lymphadenopathy [Normal Rate] : normal rate  [Regular Rhythm] : with a regular rhythm [No Murmur] : no murmur heard [No Edema] : there was no peripheral edema

## 2021-04-13 ENCOUNTER — RX RENEWAL (OUTPATIENT)
Age: 76
End: 2021-04-13

## 2021-04-25 ENCOUNTER — RX RENEWAL (OUTPATIENT)
Age: 76
End: 2021-04-25

## 2021-05-07 ENCOUNTER — APPOINTMENT (OUTPATIENT)
Dept: ORTHOPEDIC SURGERY | Facility: CLINIC | Age: 76
End: 2021-05-07
Payer: MEDICARE

## 2021-05-07 ENCOUNTER — NON-APPOINTMENT (OUTPATIENT)
Age: 76
End: 2021-05-07

## 2021-05-07 VITALS
WEIGHT: 260 LBS | SYSTOLIC BLOOD PRESSURE: 139 MMHG | BODY MASS INDEX: 39.4 KG/M2 | HEIGHT: 68 IN | DIASTOLIC BLOOD PRESSURE: 68 MMHG | TEMPERATURE: 97.7 F | HEART RATE: 80 BPM

## 2021-05-07 PROCEDURE — 99214 OFFICE O/P EST MOD 30 MIN: CPT | Mod: 25

## 2021-05-07 PROCEDURE — 20610 DRAIN/INJ JOINT/BURSA W/O US: CPT | Mod: LT

## 2021-05-07 PROCEDURE — 73560 X-RAY EXAM OF KNEE 1 OR 2: CPT | Mod: LT

## 2021-05-10 ENCOUNTER — RX RENEWAL (OUTPATIENT)
Age: 76
End: 2021-05-10

## 2021-05-17 ENCOUNTER — RX RENEWAL (OUTPATIENT)
Age: 76
End: 2021-05-17

## 2021-05-19 ENCOUNTER — INPATIENT (INPATIENT)
Facility: HOSPITAL | Age: 76
LOS: 1 days | Discharge: HOME | DRG: 641 | End: 2021-05-21
Attending: STUDENT IN AN ORGANIZED HEALTH CARE EDUCATION/TRAINING PROGRAM | Admitting: FAMILY MEDICINE
Payer: MEDICARE

## 2021-05-19 VITALS — WEIGHT: 259.93 LBS | HEIGHT: 68 IN

## 2021-05-19 DIAGNOSIS — Z98.890 OTHER SPECIFIED POSTPROCEDURAL STATES: Chronic | ICD-10-CM

## 2021-05-19 DIAGNOSIS — R42 DIZZINESS AND GIDDINESS: ICD-10-CM

## 2021-05-19 LAB
ALBUMIN SERPL ELPH-MCNC: 3.6 G/DL — SIGNIFICANT CHANGE UP (ref 3.3–5)
ALBUMIN SERPL ELPH-MCNC: 4.3 G/DL
ALP SERPL-CCNC: 72 U/L — SIGNIFICANT CHANGE UP (ref 40–120)
ALT FLD-CCNC: 44 U/L — SIGNIFICANT CHANGE UP (ref 12–78)
ANION GAP SERPL CALC-SCNC: 13 MMOL/L
ANION GAP SERPL CALC-SCNC: 5 MMOL/L — SIGNIFICANT CHANGE UP (ref 5–17)
APTT BLD: 30.2 SEC — SIGNIFICANT CHANGE UP (ref 27.5–35.5)
AST SERPL-CCNC: 24 U/L — SIGNIFICANT CHANGE UP (ref 15–37)
BASOPHILS # BLD AUTO: 0.02 K/UL — SIGNIFICANT CHANGE UP (ref 0–0.2)
BASOPHILS # BLD AUTO: 0.03 K/UL
BASOPHILS NFR BLD AUTO: 0.2 % — SIGNIFICANT CHANGE UP (ref 0–2)
BASOPHILS NFR BLD AUTO: 0.3 %
BILIRUB SERPL-MCNC: 0.3 MG/DL — SIGNIFICANT CHANGE UP (ref 0.2–1.2)
BUN SERPL-MCNC: 38 MG/DL — HIGH (ref 7–23)
BUN SERPL-MCNC: 42 MG/DL
CALCIUM SERPL-MCNC: 9.2 MG/DL — SIGNIFICANT CHANGE UP (ref 8.5–10.1)
CALCIUM SERPL-MCNC: 9.3 MG/DL
CHLORIDE SERPL-SCNC: 108 MMOL/L
CHLORIDE SERPL-SCNC: 115 MMOL/L — HIGH (ref 96–108)
CO2 SERPL-SCNC: 18 MMOL/L
CO2 SERPL-SCNC: 24 MMOL/L — SIGNIFICANT CHANGE UP (ref 22–31)
CREAT SERPL-MCNC: 1.73 MG/DL
CREAT SERPL-MCNC: 1.74 MG/DL — HIGH (ref 0.5–1.3)
EOSINOPHIL # BLD AUTO: 0.13 K/UL
EOSINOPHIL # BLD AUTO: 0.13 K/UL — SIGNIFICANT CHANGE UP (ref 0–0.5)
EOSINOPHIL NFR BLD AUTO: 1.3 %
EOSINOPHIL NFR BLD AUTO: 1.4 % — SIGNIFICANT CHANGE UP (ref 0–6)
ESTIMATED AVERAGE GLUCOSE: 123 MG/DL
GLUCOSE SERPL-MCNC: 119 MG/DL — HIGH (ref 70–99)
GLUCOSE SERPL-MCNC: 96 MG/DL
HBA1C MFR BLD HPLC: 5.9 %
HCT VFR BLD CALC: 35.1 % — LOW (ref 39–50)
HCT VFR BLD CALC: 36.7 %
HGB BLD-MCNC: 11 G/DL — LOW (ref 13–17)
HGB BLD-MCNC: 11.2 G/DL
IMM GRANULOCYTES NFR BLD AUTO: 0.7 %
IMM GRANULOCYTES NFR BLD AUTO: 0.7 % — SIGNIFICANT CHANGE UP (ref 0–1.5)
INR BLD: 0.96 RATIO — SIGNIFICANT CHANGE UP (ref 0.88–1.16)
LYMPHOCYTES # BLD AUTO: 1.54 K/UL — SIGNIFICANT CHANGE UP (ref 1–3.3)
LYMPHOCYTES # BLD AUTO: 1.83 K/UL
LYMPHOCYTES # BLD AUTO: 16.7 % — SIGNIFICANT CHANGE UP (ref 13–44)
LYMPHOCYTES NFR BLD AUTO: 18.4 %
MAGNESIUM SERPL-MCNC: 2.4 MG/DL — SIGNIFICANT CHANGE UP (ref 1.6–2.6)
MAN DIFF?: NORMAL
MCHC RBC-ENTMCNC: 29.2 PG — SIGNIFICANT CHANGE UP (ref 27–34)
MCHC RBC-ENTMCNC: 29.4 PG
MCHC RBC-ENTMCNC: 30.5 GM/DL
MCHC RBC-ENTMCNC: 31.3 GM/DL — LOW (ref 32–36)
MCV RBC AUTO: 93.1 FL — SIGNIFICANT CHANGE UP (ref 80–100)
MCV RBC AUTO: 96.3 FL
MONOCYTES # BLD AUTO: 0.78 K/UL — SIGNIFICANT CHANGE UP (ref 0–0.9)
MONOCYTES # BLD AUTO: 0.81 K/UL
MONOCYTES NFR BLD AUTO: 8.1 %
MONOCYTES NFR BLD AUTO: 8.5 % — SIGNIFICANT CHANGE UP (ref 2–14)
NEUTROPHILS # BLD AUTO: 6.68 K/UL — SIGNIFICANT CHANGE UP (ref 1.8–7.4)
NEUTROPHILS # BLD AUTO: 7.1 K/UL
NEUTROPHILS NFR BLD AUTO: 71.2 %
NEUTROPHILS NFR BLD AUTO: 72.5 % — SIGNIFICANT CHANGE UP (ref 43–77)
PHOSPHATE SERPL-MCNC: 4.6 MG/DL
PLATELET # BLD AUTO: 179 K/UL — SIGNIFICANT CHANGE UP (ref 150–400)
PLATELET # BLD AUTO: 198 K/UL
POTASSIUM SERPL-MCNC: 4.6 MMOL/L — SIGNIFICANT CHANGE UP (ref 3.5–5.3)
POTASSIUM SERPL-SCNC: 4.5 MMOL/L
POTASSIUM SERPL-SCNC: 4.6 MMOL/L — SIGNIFICANT CHANGE UP (ref 3.5–5.3)
PROT SERPL-MCNC: 7.1 GM/DL — SIGNIFICANT CHANGE UP (ref 6–8.3)
PROTHROM AB SERPL-ACNC: 11.3 SEC — SIGNIFICANT CHANGE UP (ref 10.6–13.6)
RBC # BLD: 3.77 M/UL — LOW (ref 4.2–5.8)
RBC # BLD: 3.81 M/UL
RBC # FLD: 13.7 % — SIGNIFICANT CHANGE UP (ref 10.3–14.5)
RBC # FLD: 14.4 %
SARS-COV-2 RNA SPEC QL NAA+PROBE: SIGNIFICANT CHANGE UP
SODIUM SERPL-SCNC: 139 MMOL/L
SODIUM SERPL-SCNC: 144 MMOL/L — SIGNIFICANT CHANGE UP (ref 135–145)
TROPONIN I SERPL-MCNC: <0.015 NG/ML — SIGNIFICANT CHANGE UP (ref 0.01–0.04)
TROPONIN I SERPL-MCNC: <0.015 NG/ML — SIGNIFICANT CHANGE UP (ref 0.01–0.04)
URATE SERPL-MCNC: 8.8 MG/DL
WBC # BLD: 9.21 K/UL — SIGNIFICANT CHANGE UP (ref 3.8–10.5)
WBC # FLD AUTO: 9.21 K/UL — SIGNIFICANT CHANGE UP (ref 3.8–10.5)
WBC # FLD AUTO: 9.97 K/UL

## 2021-05-19 PROCEDURE — 85730 THROMBOPLASTIN TIME PARTIAL: CPT

## 2021-05-19 PROCEDURE — 97116 GAIT TRAINING THERAPY: CPT | Mod: GP

## 2021-05-19 PROCEDURE — 80048 BASIC METABOLIC PNL TOTAL CA: CPT

## 2021-05-19 PROCEDURE — G1004: CPT

## 2021-05-19 PROCEDURE — 80061 LIPID PANEL: CPT

## 2021-05-19 PROCEDURE — 94640 AIRWAY INHALATION TREATMENT: CPT

## 2021-05-19 PROCEDURE — 83036 HEMOGLOBIN GLYCOSYLATED A1C: CPT

## 2021-05-19 PROCEDURE — 84484 ASSAY OF TROPONIN QUANT: CPT

## 2021-05-19 PROCEDURE — 71045 X-RAY EXAM CHEST 1 VIEW: CPT | Mod: 26

## 2021-05-19 PROCEDURE — 93306 TTE W/DOPPLER COMPLETE: CPT

## 2021-05-19 PROCEDURE — 36415 COLL VENOUS BLD VENIPUNCTURE: CPT

## 2021-05-19 PROCEDURE — 86769 SARS-COV-2 COVID-19 ANTIBODY: CPT

## 2021-05-19 PROCEDURE — 93010 ELECTROCARDIOGRAM REPORT: CPT

## 2021-05-19 PROCEDURE — 99497 ADVNCD CARE PLAN 30 MIN: CPT | Mod: 25

## 2021-05-19 PROCEDURE — 70551 MRI BRAIN STEM W/O DYE: CPT

## 2021-05-19 PROCEDURE — 70450 CT HEAD/BRAIN W/O DYE: CPT | Mod: 26,MG

## 2021-05-19 PROCEDURE — 99285 EMERGENCY DEPT VISIT HI MDM: CPT | Mod: CS

## 2021-05-19 PROCEDURE — 97163 PT EVAL HIGH COMPLEX 45 MIN: CPT | Mod: GP

## 2021-05-19 PROCEDURE — 99223 1ST HOSP IP/OBS HIGH 75: CPT

## 2021-05-19 PROCEDURE — 70547 MR ANGIOGRAPHY NECK W/O DYE: CPT

## 2021-05-19 PROCEDURE — 85610 PROTHROMBIN TIME: CPT

## 2021-05-19 RX ORDER — SERTRALINE 25 MG/1
25 TABLET, FILM COATED ORAL DAILY
Refills: 0 | Status: DISCONTINUED | OUTPATIENT
Start: 2021-05-19 | End: 2021-05-21

## 2021-05-19 RX ORDER — ATORVASTATIN CALCIUM 80 MG/1
40 TABLET, FILM COATED ORAL AT BEDTIME
Refills: 0 | Status: DISCONTINUED | OUTPATIENT
Start: 2021-05-19 | End: 2021-05-21

## 2021-05-19 RX ORDER — SODIUM CHLORIDE 9 MG/ML
1000 INJECTION INTRAMUSCULAR; INTRAVENOUS; SUBCUTANEOUS
Refills: 0 | Status: DISCONTINUED | OUTPATIENT
Start: 2021-05-19 | End: 2021-05-21

## 2021-05-19 RX ORDER — ALLOPURINOL 300 MG
100 TABLET ORAL DAILY
Refills: 0 | Status: DISCONTINUED | OUTPATIENT
Start: 2021-05-19 | End: 2021-05-21

## 2021-05-19 RX ORDER — METOPROLOL TARTRATE 50 MG
50 TABLET ORAL DAILY
Refills: 0 | Status: DISCONTINUED | OUTPATIENT
Start: 2021-05-19 | End: 2021-05-21

## 2021-05-19 RX ORDER — COLCHICINE 0.6 MG
0.6 TABLET ORAL DAILY
Refills: 0 | Status: DISCONTINUED | OUTPATIENT
Start: 2021-05-19 | End: 2021-05-21

## 2021-05-19 RX ORDER — CHOLECALCIFEROL (VITAMIN D3) 125 MCG
2000 CAPSULE ORAL DAILY
Refills: 0 | Status: DISCONTINUED | OUTPATIENT
Start: 2021-05-19 | End: 2021-05-21

## 2021-05-19 RX ORDER — FLUTICASONE PROPIONATE 50 MCG
2 SPRAY, SUSPENSION NASAL
Refills: 0 | Status: DISCONTINUED | OUTPATIENT
Start: 2021-05-19 | End: 2021-05-21

## 2021-05-19 RX ORDER — SERTRALINE 25 MG/1
1 TABLET, FILM COATED ORAL
Qty: 0 | Refills: 0 | DISCHARGE

## 2021-05-19 RX ORDER — ONDANSETRON 8 MG/1
4 TABLET, FILM COATED ORAL EVERY 4 HOURS
Refills: 0 | Status: DISCONTINUED | OUTPATIENT
Start: 2021-05-19 | End: 2021-05-21

## 2021-05-19 RX ORDER — ASCORBIC ACID 60 MG
500 TABLET,CHEWABLE ORAL DAILY
Refills: 0 | Status: DISCONTINUED | OUTPATIENT
Start: 2021-05-19 | End: 2021-05-21

## 2021-05-19 RX ORDER — AMLODIPINE BESYLATE 2.5 MG/1
10 TABLET ORAL DAILY
Refills: 0 | Status: DISCONTINUED | OUTPATIENT
Start: 2021-05-19 | End: 2021-05-21

## 2021-05-19 RX ORDER — HEPARIN SODIUM 5000 [USP'U]/ML
5000 INJECTION INTRAVENOUS; SUBCUTANEOUS EVERY 8 HOURS
Refills: 0 | Status: DISCONTINUED | OUTPATIENT
Start: 2021-05-19 | End: 2021-05-21

## 2021-05-19 RX ORDER — METOCLOPRAMIDE HCL 10 MG
10 TABLET ORAL ONCE
Refills: 0 | Status: COMPLETED | OUTPATIENT
Start: 2021-05-19 | End: 2021-05-19

## 2021-05-19 RX ORDER — SODIUM CHLORIDE 9 MG/ML
1000 INJECTION INTRAMUSCULAR; INTRAVENOUS; SUBCUTANEOUS ONCE
Refills: 0 | Status: COMPLETED | OUTPATIENT
Start: 2021-05-19 | End: 2021-05-19

## 2021-05-19 RX ORDER — LOSARTAN POTASSIUM 100 MG/1
1 TABLET, FILM COATED ORAL
Qty: 0 | Refills: 0 | DISCHARGE

## 2021-05-19 RX ORDER — ACETAMINOPHEN 500 MG
650 TABLET ORAL EVERY 6 HOURS
Refills: 0 | Status: DISCONTINUED | OUTPATIENT
Start: 2021-05-19 | End: 2021-05-21

## 2021-05-19 RX ORDER — TAMSULOSIN HYDROCHLORIDE 0.4 MG/1
1 CAPSULE ORAL
Qty: 0 | Refills: 0 | DISCHARGE

## 2021-05-19 RX ORDER — POTASSIUM CITRATE MONOHYDRATE 100 %
1 POWDER (GRAM) MISCELLANEOUS
Qty: 0 | Refills: 0 | DISCHARGE

## 2021-05-19 RX ORDER — PREGABALIN 225 MG/1
1000 CAPSULE ORAL DAILY
Refills: 0 | Status: DISCONTINUED | OUTPATIENT
Start: 2021-05-19 | End: 2021-05-21

## 2021-05-19 RX ORDER — ASPIRIN/CALCIUM CARB/MAGNESIUM 324 MG
81 TABLET ORAL DAILY
Refills: 0 | Status: DISCONTINUED | OUTPATIENT
Start: 2021-05-19 | End: 2021-05-21

## 2021-05-19 RX ORDER — DULAGLUTIDE 4.5 MG/.5ML
1.5 INJECTION, SOLUTION SUBCUTANEOUS
Qty: 0 | Refills: 0 | DISCHARGE

## 2021-05-19 RX ADMIN — Medication 50 MILLIGRAM(S): at 20:50

## 2021-05-19 RX ADMIN — SERTRALINE 25 MILLIGRAM(S): 25 TABLET, FILM COATED ORAL at 20:50

## 2021-05-19 RX ADMIN — Medication 0.6 MILLIGRAM(S): at 22:19

## 2021-05-19 RX ADMIN — Medication 10 MILLIGRAM(S): at 16:31

## 2021-05-19 RX ADMIN — Medication 2 SPRAY(S): at 22:20

## 2021-05-19 RX ADMIN — SODIUM CHLORIDE 1000 MILLILITER(S): 9 INJECTION INTRAMUSCULAR; INTRAVENOUS; SUBCUTANEOUS at 14:44

## 2021-05-19 RX ADMIN — Medication 100 MILLIGRAM(S): at 20:50

## 2021-05-19 RX ADMIN — HEPARIN SODIUM 5000 UNIT(S): 5000 INJECTION INTRAVENOUS; SUBCUTANEOUS at 22:20

## 2021-05-19 RX ADMIN — SODIUM CHLORIDE 100 MILLILITER(S): 9 INJECTION INTRAMUSCULAR; INTRAVENOUS; SUBCUTANEOUS at 22:20

## 2021-05-19 RX ADMIN — ATORVASTATIN CALCIUM 40 MILLIGRAM(S): 80 TABLET, FILM COATED ORAL at 22:20

## 2021-05-19 RX ADMIN — AMLODIPINE BESYLATE 10 MILLIGRAM(S): 2.5 TABLET ORAL at 20:50

## 2021-05-19 RX ADMIN — SODIUM CHLORIDE 1000 MILLILITER(S): 9 INJECTION INTRAMUSCULAR; INTRAVENOUS; SUBCUTANEOUS at 13:44

## 2021-05-19 NOTE — ED PROVIDER NOTE - IV ALTEPASE ADMIN HIDDEN
"-- Message is from the [facility]--    Reason for Call: Patient's wife is calling she and patient are both coughing, patient's wife is scheduled today @ 4:15PM and wants to know if patient could be seen same time as her please call them to let them know     Caller Information       Type Contact Phone    04/01/2019 09:26 AM Phone (Incoming) Rodney Colindrse (Self) [phone] (H)          Alternative phone number: [phone]    Turnaround time given to caller: ""This message will be sent to [facility] Provider's name]. The clinical team will fulfill your request as soon as they review your message. \""    " show

## 2021-05-19 NOTE — H&P ADULT - HISTORY OF PRESENT ILLNESS
Pt is a 76 yo male with a pmh/o Chronic back pain, gout, YO, HTN, HLD, obesity, Anemia, bladder cancer, RLL granuloma, who is a former smoker, who presents to ED today due to approx 1.5 weeks of dizziness. Pt states that he is having intermittent dizziness where he feels as if he was "drunk" or "on a boat rocking back and forth". Pt states it occurs both at rest and with activity and is not related to positional change. Pt states it has become worse and yesterday began to be associated with gait instability, requiring him to have two peson assistance to home from car. Once home, pt fell to floor due to "weak legs" and with wife's assistance was able to stand again. Pt injured arm but did not lose consciousness hit head. Pt has no previous episodes. Denies paresthesias numbness, changes in vision/hearing/speech, loss of motor function (states legs feel strong but uncoordinated), cp, palpitations nausea, diaphoresis. Pt does endorse mild headache for which he took full dose aspirin prior to arrival.

## 2021-05-19 NOTE — H&P ADULT - ADDITIONAL PE
pt observed by admitting MD ambulating without difficulty throughout ED hallway while on cell phone.

## 2021-05-19 NOTE — ED PROVIDER NOTE - CARE PLAN
Principal Discharge DX:	Dizzy   Principal Discharge DX:	Dizzy  Secondary Diagnosis:	ALBERT (acute kidney injury)

## 2021-05-19 NOTE — ED PROVIDER NOTE - PROGRESS NOTE DETAILS
ED evaluation and management discussed with the patient and family (if available) in detail.  Close PMD follow up encouraged.  Strict ED return instructions discussed in detail and patient given the opportunity to ask any questions about their discharge diagnosis and instructions. Patient verbalized understanding. Helping Dr. Swain with dispositions.  Discussed all findings with patient.  Pt ambulated, still feels dizzy, described as "feeling drunk".  Nonpositional vertigo.  Discussed with patient options admission vs d/c home with neuro follow up.  However as patient still symptomatic will admit to r/o central cause. DIscussed with Dr. Mercer for admission. s.ig

## 2021-05-19 NOTE — H&P ADULT - CONVERSATION DETAILS
advanced care planning discussed with patient who is a full code at this time.  Pt accepts cpr and intubation if required with wife to act as surrogate.

## 2021-05-19 NOTE — H&P ADULT - ASSESSMENT
Pt is a 76 yo male with a pmh/o HTN, HLD, who presents to ED after approx. 1.5 weeks of progressive dizziness associated with unsteady gait, admitted due to:    #Dizziness, gait instability, concerning for neurological etiology, TIA, Vertigo  Due to duration of symptoms and neg. CT head, unlikely CVA however cannot rule out multiple TIA at this time.  S/p full dose aspirin prior to arrival  STAT statin ordered, pt already on at home  Orthostatics ordered, given ALBERT due to prerenal etiology may contribute  admit to telemetry  MR/MRA neck/ brain pending  TTE ordered- last in EMR 2019 with preserved EF and reduced left ventricular fxn, trace MVR  HbA1c and lipid panel  fall and aspiration precautions  bedside swallow eval passed  DASH/TLC diet  PT evaluation given report of fall  Neurochecks q 4 hrs  cbc, cmp, coags for AM  VCD boots, heparin for DVT prophylaxis  EKG HR 74, PVC's present  Troponin wnl x 2  Neurology consult: Dr. Spring    #Fall  PT eval  fall precautions  ambulate with assistance  tylenol prn pain    #Anemia  at baseline h/h  monitor plt on heparin for dvt ppx    #ALBERT, baseline Cr 1.4  s/p 1L bolus in ED  cont gentle hydration x 1L  PO hydration promoted  monitor renal function-hold ARB, Chlorthalidone for now  renally dose and avoid nephrotoxic meds    #Hyperglycemia  f/u HbA1c  consider carb restriction, AISS pending trend  consider POC qAC/HS pending trend    #HTN/HLD  DASH/TLC diet  cont metoprolol  hold antiHTN as above  cont atorvastatin, hold ezetimibe as non formulary    #Gout  cont allopurinol  cont colchicine  monitor renal function and cont hydration    #Chronic back pain  tylenol prn  cont sertraline

## 2021-05-19 NOTE — ED ADULT NURSE NOTE - PMH
Arthritis    Bladder cancer    HLD (hyperlipidemia)    HTN (hypertension)    Kidney stones    Neuropathy

## 2021-05-19 NOTE — ED PROVIDER NOTE - NS ED ROS FT
Constitutional: No fever or chills, +unsteady on his feet  Eyes: No visual changes  HEENT: No throat pain  CV: No chest pain  Resp: No SOB no cough  GI: No abd pain, nausea or vomiting  : No dysuria  MSK: No musculoskeletal pain  Skin: No rash  Neuro: No headache, +dizziness

## 2021-05-19 NOTE — ED STATDOCS - PROGRESS NOTE DETAILS
Berlin Foley for attending Dr. Reed: 76 y/o male with a PMHx of bladder cancer, HLD, HTN, kidney stones, neuropathy, vertigo presents to the ED c/o intermittent dizziness x1 week. Pt fell last night. No head trauma. Reports he feels unsteady on his feet. Denies weakness, CP. No other complaints at this time. Will send pt to main ED for further evaluation.

## 2021-05-19 NOTE — ED ADULT TRIAGE NOTE - CHIEF COMPLAINT QUOTE
pt A/Ox3. pt states "I have been experiencing a headache and dizziness. I fell last night at home but did not hit my head." Pt denies vision changes, numbness/tingling, weakness, and slurred speech. Pt reports hx of vertigo.

## 2021-05-19 NOTE — ED ADULT NURSE REASSESSMENT NOTE - NS ED NURSE REASSESS COMMENT FT1
Patient evaluated by MD Reed.  Patient status changed to Admission.  IV flushed and patent.  Patient placed on TX as per MD request.  Dinner Tray ordered will continue to monitor.

## 2021-05-19 NOTE — ED PROVIDER NOTE - CLINICAL SUMMARY MEDICAL DECISION MAKING FREE TEXT BOX
74 y/o male with unsteady gait, and dizziness which was intermittent for a week but constant since last night.  No chest pain, vertiginous, recent uri sx. Will obtain CT, blood work, will try ambulation trial and will reassess.

## 2021-05-19 NOTE — ED PROVIDER NOTE - PATIENT PORTAL LINK FT
You can access the FollowMyHealth Patient Portal offered by [facility] by registering at the following website: [email or web address]. By joining [employer]’s FollowMyHealth portal, you will also be able to view your health information using other applications (apps) compatible with our system.

## 2021-05-19 NOTE — ED PROVIDER NOTE - CARE PROVIDER_API CALL
Te Spring  INTERNAL MEDICINE  [address], [address]  [place] [address]  Phone: [phone]  Fax: [phone]  Follow Up Time: 1-3 Days

## 2021-05-19 NOTE — H&P ADULT - NSICDXPASTMEDICALHX_GEN_ALL_CORE_FT
PAST MEDICAL HISTORY:  Arthritis     Bladder cancer     HLD (hyperlipidemia)     HTN (hypertension)     Kidney stones     Neuropathy

## 2021-05-19 NOTE — ED ADULT NURSE NOTE - OBJECTIVE STATEMENT
Pt comes in complaining of dizziness for 1 week and unsteady gait. States he fell yesterday no head injury or injury. Denies any pain currently or angina. +light sensitivity and states he gets dizzy when moving his head. aox3.

## 2021-05-19 NOTE — ED ADULT NURSE REASSESSMENT NOTE - NS ED NURSE REASSESS COMMENT FT1
Patient was to be discharged home, Patient complained of headache, Reglan ordered by MD Herzog for patient and given with no changes.  Patient continues to have a headache 4/10 at this time and dizziness is unresolved.  MD Swain aware of patient's current complaints and condition.

## 2021-05-19 NOTE — ED PROVIDER NOTE - OBJECTIVE STATEMENT
62 y/o female with PMHx of arthritis, bladder cancer, HLD, HTN, kidney stones, neuropathy presents to the ED with intermittent dizziness x 1 week but constant since last night. Denies trouble talking, or swallowing, abdominal pain. Pt feels he is unsteady on feet when walking. Pt had a fall yesterday. Denies head strike. Non-smoker. Occasional Drinker.

## 2021-05-19 NOTE — ED PROVIDER NOTE - PHYSICAL EXAMINATION
Constitutional: Elderly male laying in bed in NAD AAOx3  Eyes: PERRLA EOMI  Head: Normocephalic atraumatic, bilateral TM are clear   Mouth: MMM  Cardiac: regular rate   Resp: Lungs CTAB  GI: Abd s/nt/nd, no rebound or guarding.  Neuro: awake, alert, moving all extremities, cranial nerves 2-12 intact, sensation intact, no dysmetria.  Skin: No rashes

## 2021-05-19 NOTE — H&P ADULT - NEGATIVE NEUROLOGICAL SYMPTOMS
no transient paralysis/no weakness/no paresthesias/no generalized seizures/no focal seizures/no syncope/no tremors/no vertigo/no loss of consciousness/no hemiparesis/no confusion/no facial palsy

## 2021-05-19 NOTE — H&P ADULT - NSICDXFAMILYHX_GEN_ALL_CORE_FT
FAMILY HISTORY:  Father  Still living? No  FHx: lung cancer, Age at diagnosis: Age Unknown    Mother  Still living? No  FH: heart disease, Age at diagnosis: Age Unknown

## 2021-05-20 ENCOUNTER — TRANSCRIPTION ENCOUNTER (OUTPATIENT)
Age: 76
End: 2021-05-20

## 2021-05-20 LAB
A1C WITH ESTIMATED AVERAGE GLUCOSE RESULT: 5.9 % — HIGH (ref 4–5.6)
ANION GAP SERPL CALC-SCNC: 8 MMOL/L — SIGNIFICANT CHANGE UP (ref 5–17)
APTT BLD: 28.4 SEC — SIGNIFICANT CHANGE UP (ref 27.5–35.5)
BUN SERPL-MCNC: 30 MG/DL — HIGH (ref 7–23)
CALCIUM SERPL-MCNC: 9 MG/DL — SIGNIFICANT CHANGE UP (ref 8.5–10.1)
CHLORIDE SERPL-SCNC: 110 MMOL/L — HIGH (ref 96–108)
CHOLEST SERPL-MCNC: 190 MG/DL — SIGNIFICANT CHANGE UP
CO2 SERPL-SCNC: 23 MMOL/L — SIGNIFICANT CHANGE UP (ref 22–31)
COVID-19 SPIKE DOMAIN AB INTERP: POSITIVE
COVID-19 SPIKE DOMAIN ANTIBODY RESULT: 108 U/ML — HIGH
CREAT SERPL-MCNC: 1.31 MG/DL — HIGH (ref 0.5–1.3)
ESTIMATED AVERAGE GLUCOSE: 123 MG/DL — HIGH (ref 68–114)
GLUCOSE SERPL-MCNC: 96 MG/DL — SIGNIFICANT CHANGE UP (ref 70–99)
HDLC SERPL-MCNC: 37 MG/DL — LOW
INR BLD: 1.05 RATIO — SIGNIFICANT CHANGE UP (ref 0.88–1.16)
LIPID PNL WITH DIRECT LDL SERPL: 78 MG/DL — SIGNIFICANT CHANGE UP
NON HDL CHOLESTEROL: 153 MG/DL — HIGH
POTASSIUM SERPL-MCNC: 4.2 MMOL/L — SIGNIFICANT CHANGE UP (ref 3.5–5.3)
POTASSIUM SERPL-SCNC: 4.2 MMOL/L — SIGNIFICANT CHANGE UP (ref 3.5–5.3)
PROTHROM AB SERPL-ACNC: 12.1 SEC — SIGNIFICANT CHANGE UP (ref 10.6–13.6)
SARS-COV-2 IGG+IGM SERPL QL IA: 108 U/ML — HIGH
SARS-COV-2 IGG+IGM SERPL QL IA: POSITIVE
SODIUM SERPL-SCNC: 141 MMOL/L — SIGNIFICANT CHANGE UP (ref 135–145)
TRIGL SERPL-MCNC: 375 MG/DL — HIGH

## 2021-05-20 PROCEDURE — 99232 SBSQ HOSP IP/OBS MODERATE 35: CPT

## 2021-05-20 PROCEDURE — 99223 1ST HOSP IP/OBS HIGH 75: CPT

## 2021-05-20 PROCEDURE — 93306 TTE W/DOPPLER COMPLETE: CPT | Mod: 26

## 2021-05-20 RX ORDER — TAMSULOSIN HYDROCHLORIDE 0.4 MG/1
0.4 CAPSULE ORAL ONCE
Refills: 0 | Status: COMPLETED | OUTPATIENT
Start: 2021-05-20 | End: 2021-05-20

## 2021-05-20 RX ADMIN — ATORVASTATIN CALCIUM 40 MILLIGRAM(S): 80 TABLET, FILM COATED ORAL at 21:01

## 2021-05-20 RX ADMIN — Medication 2000 UNIT(S): at 12:35

## 2021-05-20 RX ADMIN — Medication 0.6 MILLIGRAM(S): at 12:35

## 2021-05-20 RX ADMIN — Medication 81 MILLIGRAM(S): at 12:35

## 2021-05-20 RX ADMIN — SERTRALINE 25 MILLIGRAM(S): 25 TABLET, FILM COATED ORAL at 12:35

## 2021-05-20 RX ADMIN — HEPARIN SODIUM 5000 UNIT(S): 5000 INJECTION INTRAVENOUS; SUBCUTANEOUS at 15:14

## 2021-05-20 RX ADMIN — HEPARIN SODIUM 5000 UNIT(S): 5000 INJECTION INTRAVENOUS; SUBCUTANEOUS at 21:01

## 2021-05-20 RX ADMIN — Medication 100 MILLIGRAM(S): at 12:35

## 2021-05-20 RX ADMIN — HEPARIN SODIUM 5000 UNIT(S): 5000 INJECTION INTRAVENOUS; SUBCUTANEOUS at 05:50

## 2021-05-20 RX ADMIN — Medication 2 SPRAY(S): at 21:01

## 2021-05-20 RX ADMIN — Medication 500 MILLIGRAM(S): at 12:35

## 2021-05-20 RX ADMIN — Medication 1 TABLET(S): at 12:35

## 2021-05-20 RX ADMIN — Medication 2 SPRAY(S): at 12:36

## 2021-05-20 RX ADMIN — PREGABALIN 1000 MICROGRAM(S): 225 CAPSULE ORAL at 12:35

## 2021-05-20 RX ADMIN — Medication 50 MILLIGRAM(S): at 12:35

## 2021-05-20 RX ADMIN — AMLODIPINE BESYLATE 10 MILLIGRAM(S): 2.5 TABLET ORAL at 12:35

## 2021-05-20 NOTE — CONSULT NOTE ADULT - ASSESSMENT
75 year old man c/o dizziness, gait instability, non focal exam, head CT showed no acute changes. Admitted to telemetry, pending head MR. r/o CVA, ? BPV.  suggest:  continue asa,statin.  f/u B/P  MR/MRA neck/ brain pending  TTE   TP eval  If MR head negative for cause of symptoms, would benefit form outpatient ENT eval, VENG

## 2021-05-20 NOTE — PHYSICAL THERAPY INITIAL EVALUATION ADULT - ADDITIONAL COMMENTS
Pt was working, driving and ambulating Independently with no AD PTA  Pt admits to using RW at times when dizziness increases; Otherwise patient very independent

## 2021-05-20 NOTE — DISCHARGE NOTE NURSING/CASE MANAGEMENT/SOCIAL WORK - PATIENT PORTAL LINK FT
You can access the FollowMyHealth Patient Portal offered by [facility] by registering at the following website: [email or web address]. By joining [employer]’s FollowMyHealth portal, you will also be able to view your health information using other applications (apps) compatible with our system.

## 2021-05-20 NOTE — PHYSICAL THERAPY INITIAL EVALUATION ADULT - MODALITIES TREATMENT COMMENTS
Pt left OOB in chair in NAD with HM Intact; CBIR; Pt instructed to not get up alone; KAM Ortez made aware

## 2021-05-20 NOTE — CONSULT NOTE ADULT - SUBJECTIVE AND OBJECTIVE BOX
Neurology Consult requested by:   Patient is a 75y old  Male who presents with a chief complaint of Vertigo, intractable gait instability, fall (20 May 2021 08:55)     HPI:  Pt is a 76 yo male with a pmh/o Chronic back pain, gout, YO, HTN, HLD, obesity, Anemia, bladder cancer, RLL granuloma, who is a former smoker, who presents to ED today due to approx 1.5 weeks of dizziness. Pt states that he is having intermittent dizziness where he feels as if he was "drunk" more common with activity yesterday he felled to floor due to "weak legs" and with wife's assistance was able to stand again. No lost of consciousness. About 1 month ago, got up form table at restaurant had transient vertigo, last couple of minutes, then resolved. C/o frontal pressure. No hearing problems, no diff speaking, unilateral weakness.    PAST MEDICAL & SURGICAL HISTORY:  HTN (hypertension)    HLD (hyperlipidemia)    Bladder cancer    Kidney stones    Neuropathy    Arthritis    Previous back surgery    H/O shoulder surgery    H/O knee surgery      FAMILY HISTORY:  FH: heart disease (Mother)    FHx: lung cancer (Father)      Social Hx:  Nonsmoker, no drug or alcohol use  Medications and Allergies ReviewedMEDICATIONS  (STANDING):  allopurinol 100 milliGRAM(s) Oral daily  amLODIPine   Tablet 10 milliGRAM(s) Oral daily  ascorbic acid 500 milliGRAM(s) Oral daily  aspirin enteric coated 81 milliGRAM(s) Oral daily  atorvastatin 40 milliGRAM(s) Oral at bedtime  cholecalciferol 2000 Unit(s) Oral daily  colchicine 0.6 milliGRAM(s) Oral daily  cyanocobalamin 1000 MICROGram(s) Oral daily  fluticasone propionate 50 MICROgram(s)/spray Nasal Spray 2 Spray(s) Both Nostrils two times a day  heparin   Injectable 5000 Unit(s) SubCutaneous every 8 hours  metoprolol succinate ER 50 milliGRAM(s) Oral daily  multivitamin 1 Tablet(s) Oral daily  sertraline 25 milliGRAM(s) Oral daily  sodium chloride 0.9%. 1000 milliLiter(s) (100 mL/Hr) IV Continuous <Continuous>     ROS: Pertinent positives in HPI, all other ROS were reviewed and are negative.      Examination:   Vital Signs Last 24 Hrs  T(C): 37.1 (20 May 2021 08:34), Max: 37.1 (19 May 2021 11:26)  T(F): 98.7 (20 May 2021 08:34), Max: 98.7 (19 May 2021 11:26)  HR: 71 (20 May 2021 08:34) (66 - 80)  BP: 156/70 (20 May 2021 08:34) (138/63 - 156/70)  BP(mean): 93 (19 May 2021 11:26) (93 - 93)  RR: 18 (20 May 2021 08:34) (15 - 18)  SpO2: 99% (20 May 2021 08:34) (96% - 100%)  General: Cooperative, NAD   NECK: supple, no masses  ENT: Normal hearing   Vascular : no carotid bruits,   Lungs: CTAB  Chest: RRR, no murmurs  Extremities: nontender, no edema  Musculoskeletal: no adventitious movements, no joint stiffness  Skin: no rash    Neurological Examination:  NIHSS:0  MS: AOx3. Appropriately interactive, normal affect. Speech fluent w/o paraphasic error, repetition, naming is intact   CN: VFFTC, PERLL, EOMI, V1-3 sensation intact, face symmetric, hearing intact, palate elevates symmetrically, tongue midline, SCM equal bilaterally  Motor: normal bulk and tone, no tremor, rigidity or bradykinesia.  5/5 all over   Sens: Intact to light touch.    Reflexes: 0-1/4 all over, downgoing toes b/l  Coord:  No dysmetria, STANFORD intact   Gait: Cannot test    Labs: Reviewed  Basic Metabolic Panel (05.20.21 @ 07:50)   Sodium, Serum: 141 mmol/L   Potassium, Serum: 4.2 mmol/L   Chloride, Serum: 110 mmol/L   Carbon Dioxide, Serum: 23 mmol/L   Anion Gap, Serum: 8 mmol/L   Blood Urea Nitrogen, Serum: 30 mg/dL   Creatinine, Serum: 1.31 mg/dL   Glucose, Serum: 96 mg/dL   Calcium, Total Serum: 9.0 mg/dL   eGFR if Non : 53    INR: 1.05: Recommended ranges for therapeutic INR:   Activated Partial Thromboplastin Time: 28.4 sec (05.20.21 @ 07:50)           Imaging:   < from: CT Head No Cont (05.19.21 @ 12:05) >  FINDINGS:  There is mild diffuse parenchymal volume loss. There are areas of low attenuation in the periventricular white matter likely related to mild chronic microvascular ischemic changes.    There is no acute intracranial hemorrhage, parenchymal mass, mass effect or midline shift. There is no acute territorial infarct. There is no hydrocephalus. Partial empty sella    The cranium is intact. The visualized paranasalsinuses are well-aerated.    IMPRESSION:  No acute intracranial hemorrhage or acute territorial infarct.  If symptoms persist, follow-up MRI exam recommended.    < end of copied text >

## 2021-05-20 NOTE — PROGRESS NOTE ADULT - SUBJECTIVE AND OBJECTIVE BOX
Reason for Admission: Vertigo, intractable gait instability, fall  History of Present Illness:   Pt is a 76 yo male with a pmh/o Chronic back pain, gout, YO, HTN, HLD, obesity, Anemia, bladder cancer, RLL granuloma, who is a former smoker, who presents to ED today due to approx 1.5 weeks of dizziness. Pt states that he is having intermittent dizziness where he feels as if he was "drunk" or "on a boat rocking back and forth". Pt states it occurs both at rest and with activity and is not related to positional change. Pt states it has become worse and yesterday began to be associated with gait instability, requiring him to have two peson assistance to home from car. Once home, pt fell to floor due to "weak legs" and with wife's assistance was able to stand again. Pt injured arm but did not lose consciousness hit head. Pt has no previous episodes. Denies paresthesias numbness, changes in vision/hearing/speech, loss of motor function (states legs feel strong but uncoordinated), cp, palpitations nausea, diaphoresis. Pt does endorse mild headache for which he took full dose aspirin prior to arrival.     REVIEW OF SYSTEMS:  General: NAD, hemodynamically stable, (-)  fever, (-) chills, (-) weakness  HEENT:  Eyes:  No visual loss, blurred vision, double vision or yellow sclerae. Ears, Nose, Throat:  No hearing loss, sneezing, congestion, runny nose or sore throat.  SKIN:  No rash or itching.  CARDIOVASCULAR:  No chest pain, chest pressure or chest discomfort. No palpitations or edema.  RESPIRATORY:  No shortness of breath, cough or sputum.  GASTROINTESTINAL:  No anorexia, nausea, vomiting or diarrhea. No abdominal pain or blood.  NEUROLOGICAL:  No headache, dizziness, syncope, paralysis, ataxia, numbness or tingling in the extremities. No change in bowel or bladder control.  MUSCULOSKELETAL:  No muscle, back pain, joint pain or stiffness.  HEMATOLOGIC:  No anemia, bleeding or bruising.  LYMPHATICS:  No enlarged nodes. No history of splenectomy.  ENDOCRINOLOGIC:  No reports of sweating, cold or heat intolerance. No polyuria or polydipsia.  ALLERGIES:  No history of asthma, hives, eczema or rhinitis.    Physical Exam:   GENERAL APPEARANCE:  NAD, hemodynamically stable  T(C): 37.1 (05-20-21 @ 08:34), Max: 37.1 (05-19-21 @ 11:26)  HR: 71 (05-20-21 @ 08:34) (66 - 80)  BP: 156/70 (05-20-21 @ 08:34) (138/63 - 156/70)  RR: 18 (05-20-21 @ 08:34) (15 - 18)  SpO2: 99% (05-20-21 @ 08:34) (96% - 100%)  Wt(kg): --  HEENT:  Head is normocephalic    Skin:  Warm and dry without any rash   NECK:  Supple without lymphadenopathy.   HEART:  Regular rate and rhythm. normal S1 and S2, No M/R/G  LUNGS:  Good ins/exp effort, no W/R/R/C  ABDOMEN:  Soft, nontender, nondistended with good bowel sounds heard  EXTREMITIES:  Without cyanosis, clubbing or edema.   NEUROLOGICAL:  Gross nonfocal       CBC Full  -  ( 19 May 2021 12:36 )  WBC Count : 9.21 K/uL  RBC Count : 3.77 M/uL  Hemoglobin : 11.0 g/dL  Hematocrit : 35.1 %  Platelet Count - Automated : 179 K/uL  Mean Cell Volume : 93.1 fl  Mean Cell Hemoglobin : 29.2 pg  Mean Cell Hemoglobin Concentration : 31.3 gm/dL  Auto Neutrophil # : 6.68 K/uL  Auto Lymphocyte # : 1.54 K/uL  Auto Monocyte # : 0.78 K/uL  Auto Eosinophil # : 0.13 K/uL  Auto Basophil # : 0.02 K/uL  Auto Neutrophil % : 72.5 %  Auto Lymphocyte % : 16.7 %  Auto Monocyte % : 8.5 %  Auto Eosinophil % : 1.4 %  Auto Basophil % : 0.2 %    PT/INR - ( 20 May 2021 07:50 )   PT: 12.1 sec;   INR: 1.05 ratio         PTT - ( 20 May 2021 07:50 )  PTT:28.4 sec    05-20    141  |  110<H>  |  30<H>  ----------------------------<  96  4.2   |  23  |  1.31<H>    Ca    9.0      20 May 2021 07:50  Mg     2.4     05-19    TPro  7.1  /  Alb  3.6  /  TBili  0.3  /  DBili  x   /  AST  24  /  ALT  44  /  AlkPhos  72  05-19    Pt is a 76 yo male with a pmh/o HTN, HLD, who presents to ED after approx. 1.5 weeks of progressive dizziness associated with unsteady gait, admitted due to:    #Dizziness, gait instability, concerning for neurological etiology, TIA, Vertigo  - Due to duration of symptoms and neg. CT head, unlikely CVA however cannot rule out multiple TIA at this time.  - s/p full dose aspirin prior to arrival  - Orthostatics ordered, given ALBERT due to prerenal etiology may contribute  - MR / MRA neck / brain pending  - TTE ordered- last in EMR 2019 with preserved EF and reduced left ventricular fxn, trace MVR  - pending MRI of the brain    # Fall  - PT eval  - fall precautions  - ambulate with assistance    # Anemia  - at baseline h/h  - monitor plt on heparin for dvt ppx    # ALBERT, baseline Cr 1.4  - s/p 1L bolus in ED  - cont gentle hydration x 1L  - PO hydration promoted  - monitor renal function-hold ARB, Chlorthalidone for now  - renally dose and avoid nephrotoxic meds    #Hyperglycemia  - consider carb restriction, AISS pending trend  - consider POC qAC/HS pending trend    # HTN/HLD  DASH/TLC diet  cont metoprolol  hold antiHTN as above  cont atorvastatin, hold ezetimibe as non formulary    # Gout  - cont allopurinol  - cont colchicine  - monitor renal function and cont hydration    #Chronic back pain  - tylenol prn  - cont sertraline

## 2021-05-20 NOTE — PHYSICAL THERAPY INITIAL EVALUATION ADULT - PLANNED THERAPY INTERVENTIONS, PT EVAL
balance training/bed mobility training/gait training/strengthening/transfer training No further PT needs at this time as pt is already ambulating at baseline [place]; Safe to ambulate with floor staff while at ; Will d/c acute care PT at this time

## 2021-05-21 ENCOUNTER — TRANSCRIPTION ENCOUNTER (OUTPATIENT)
Age: 76
End: 2021-05-21

## 2021-05-21 VITALS
DIASTOLIC BLOOD PRESSURE: 73 MMHG | HEART RATE: 64 BPM | SYSTOLIC BLOOD PRESSURE: 124 MMHG | OXYGEN SATURATION: 95 % | TEMPERATURE: 98 F | RESPIRATION RATE: 18 BRPM

## 2021-05-21 PROCEDURE — 72198 MR ANGIO PELVIS W/O & W/DYE: CPT | Mod: 26

## 2021-05-21 PROCEDURE — 70551 MRI BRAIN STEM W/O DYE: CPT | Mod: 26

## 2021-05-21 PROCEDURE — 99238 HOSP IP/OBS DSCHRG MGMT 30/<: CPT

## 2021-05-21 PROCEDURE — 70547 MR ANGIOGRAPHY NECK W/O DYE: CPT | Mod: 26

## 2021-05-21 RX ORDER — POTASSIUM CITRATE MONOHYDRATE 100 %
2 POWDER (GRAM) MISCELLANEOUS
Qty: 0 | Refills: 0 | DISCHARGE

## 2021-05-21 RX ORDER — CHLORTHALIDONE 50 MG
1 TABLET ORAL
Qty: 0 | Refills: 0 | DISCHARGE

## 2021-05-21 RX ORDER — COLCHICINE 0.6 MG
1 TABLET ORAL
Qty: 0 | Refills: 0 | DISCHARGE
Start: 2021-05-21

## 2021-05-21 RX ORDER — COLCHICINE 0.6 MG
1 TABLET ORAL
Qty: 0 | Refills: 0 | DISCHARGE

## 2021-05-21 RX ORDER — CX-024414 0.2 MG/ML
0.5 INJECTION, SUSPENSION INTRAMUSCULAR
Qty: 0 | Refills: 0 | DISCHARGE

## 2021-05-21 RX ADMIN — Medication 50 MILLIGRAM(S): at 09:21

## 2021-05-21 RX ADMIN — PREGABALIN 1000 MICROGRAM(S): 225 CAPSULE ORAL at 09:22

## 2021-05-21 RX ADMIN — Medication 81 MILLIGRAM(S): at 09:21

## 2021-05-21 RX ADMIN — AMLODIPINE BESYLATE 10 MILLIGRAM(S): 2.5 TABLET ORAL at 09:21

## 2021-05-21 RX ADMIN — Medication 0.6 MILLIGRAM(S): at 09:22

## 2021-05-21 RX ADMIN — Medication 2000 UNIT(S): at 09:22

## 2021-05-21 RX ADMIN — SERTRALINE 25 MILLIGRAM(S): 25 TABLET, FILM COATED ORAL at 09:22

## 2021-05-21 RX ADMIN — Medication 500 MILLIGRAM(S): at 09:21

## 2021-05-21 RX ADMIN — Medication 100 MILLIGRAM(S): at 09:22

## 2021-05-21 RX ADMIN — TAMSULOSIN HYDROCHLORIDE 0.4 MILLIGRAM(S): 0.4 CAPSULE ORAL at 00:08

## 2021-05-21 RX ADMIN — HEPARIN SODIUM 5000 UNIT(S): 5000 INJECTION INTRAVENOUS; SUBCUTANEOUS at 05:13

## 2021-05-21 RX ADMIN — Medication 2 SPRAY(S): at 09:20

## 2021-05-21 RX ADMIN — Medication 1 TABLET(S): at 09:21

## 2021-05-21 NOTE — DISCHARGE NOTE PROVIDER - HOSPITAL COURSE
bladder cancer, RLL granuloma, who is a former smoker, who presents to ED today due to approx 1.5 weeks of dizziness. Pt states that he is having intermittent dizziness where he feels as if he was "drunk" or "on a boat rocking back and forth". Pt states it occurs both at rest and with activity and is not related to positional change. Pt states it has become worse and yesterday began to be associated with gait instability, requiring him to have two peson assistance to home from car. Once home, pt fell to floor due to "weak legs" and with wife's assistance was able to stand again. Pt injured arm but did not lose consciousness hit head. Pt has no previous episodes. Denies paresthesias numbness, changes in vision/hearing/speech, loss of motor function (states legs feel strong but uncoordinated), cp, palpitations nausea, diaphoresis. Pt does endorse mild headache for which he took full dose aspirin prior to arrival.     MRI no acute findings. Patient's care discussed with Dr. Te Spring -  d/c planning. Care discussed with patient's PCP -  Dr. rich. We think that patient's dizziness most likely secondary to dehydration. Kidney function improved. We discussed healthy eating habits /  maintain good hydration.    Physical Exam:   GENERAL APPEARANCE:  NAD, hemodynamically stable  T(C): 36.4 (05-21-21 @ 08:29), Max: 37 (05-21-21 @ 00:09)  HR: 64 (05-21-21 @ 08:29) (64 - 69)  BP: 124/73 (05-21-21 @ 08:29) (124/73 - 143/64)  RR: 18 (05-21-21 @ 08:29) (18 - 18)  SpO2: 95% (05-21-21 @ 08:29) (94% - 95%)  Wt(kg): --  HEENT:  Head is normocephalic    Skin:  Warm and dry without any rash   NECK:  Supple without lymphadenopathy.   HEART:  Regular rate and rhythm. normal S1 and S2, No M/R/G  LUNGS:  Good ins/exp effort, no W/R/R/C  ABDOMEN:  Soft, nontender, nondistended with good bowel sounds heard  EXTREMITIES:  Without cyanosis, clubbing or edema.   NEUROLOGICAL:  Gross nonfocal

## 2021-05-21 NOTE — DISCHARGE NOTE PROVIDER - NSDCCPCAREPLAN_GEN_ALL_CORE_FT
PRINCIPAL DISCHARGE DIAGNOSIS  Diagnosis: Dizzy  Assessment and Plan of Treatment: - MRI not suggestive of stoke  - we suspected  patient's dizziness secondary to dehydration  - maintain good hydration  - hold chlorthalidone  - restart losartan in the am  - if any more dizziness please notify your PCP      SECONDARY DISCHARGE DIAGNOSES  Diagnosis: ALBERT (acute kidney injury)  Assessment and Plan of Treatment: - kidney function improving  - this is most likely due to meds /  dehydration

## 2021-05-21 NOTE — DISCHARGE NOTE PROVIDER - NSDCMRMEDTOKEN_GEN_ALL_CORE_FT
allopurinol 100 mg oral tablet: 1 tab(s) orally once a day  ***pt takes all meds at 5pm***  amLODIPine 10 mg oral tablet: 1 tab(s) orally once a day  ascorbic acid 500 mg oral tablet: 1 tab(s) orally once a day  aspirin 81 mg oral delayed release tablet: 1 tab(s) orally once a day  atorvastatin 40 mg oral tablet: 1 tab(s) orally once a day  CBD Oil: Use as directed  colchicine 0.6 mg oral tablet: 1 tab(s) orally once a day  cyanocobalamin 1000 mcg oral tablet: 1 tab(s) orally once a day  ezetimibe 10 mg oral tablet: 1 tab(s) orally once a day  fluticasone 50 mcg/inh nasal spray: 1 spray(s) in each nostril once a day (in the evening)  losartan 50 mg oral tablet: 1 tab(s) orally once a day  Metoprolol Succinate ER 50 mg oral tablet, extended release: 1 tab(s) orally once a day  Oncovite oral tablet: 1 tab(s) orally once a day  sertraline 25 mg oral tablet: 2 tab(s) orally once a day  Super B Complex oral tablet: 1 tab(s) orally once a day  Vitamin D3 1000 intl units (25 mcg) oral tablet: 2 tab(s) orally once a day

## 2021-05-27 DIAGNOSIS — G62.9 POLYNEUROPATHY, UNSPECIFIED: ICD-10-CM

## 2021-05-27 DIAGNOSIS — E86.0 DEHYDRATION: ICD-10-CM

## 2021-05-27 DIAGNOSIS — M10.9 GOUT, UNSPECIFIED: ICD-10-CM

## 2021-05-27 DIAGNOSIS — G89.29 OTHER CHRONIC PAIN: ICD-10-CM

## 2021-05-27 DIAGNOSIS — Z87.891 PERSONAL HISTORY OF NICOTINE DEPENDENCE: ICD-10-CM

## 2021-05-27 DIAGNOSIS — Z79.82 LONG TERM (CURRENT) USE OF ASPIRIN: ICD-10-CM

## 2021-05-27 DIAGNOSIS — M19.90 UNSPECIFIED OSTEOARTHRITIS, UNSPECIFIED SITE: ICD-10-CM

## 2021-05-27 DIAGNOSIS — R73.9 HYPERGLYCEMIA, UNSPECIFIED: ICD-10-CM

## 2021-05-27 DIAGNOSIS — G47.33 OBSTRUCTIVE SLEEP APNEA (ADULT) (PEDIATRIC): ICD-10-CM

## 2021-05-27 DIAGNOSIS — I10 ESSENTIAL (PRIMARY) HYPERTENSION: ICD-10-CM

## 2021-05-27 DIAGNOSIS — E66.01 MORBID (SEVERE) OBESITY DUE TO EXCESS CALORIES: ICD-10-CM

## 2021-05-27 DIAGNOSIS — M54.9 DORSALGIA, UNSPECIFIED: ICD-10-CM

## 2021-05-27 DIAGNOSIS — D64.9 ANEMIA, UNSPECIFIED: ICD-10-CM

## 2021-05-27 DIAGNOSIS — E78.5 HYPERLIPIDEMIA, UNSPECIFIED: ICD-10-CM

## 2021-05-27 DIAGNOSIS — N17.9 ACUTE KIDNEY FAILURE, UNSPECIFIED: ICD-10-CM

## 2021-05-27 DIAGNOSIS — R42 DIZZINESS AND GIDDINESS: ICD-10-CM

## 2021-05-31 NOTE — HISTORY OF PRESENT ILLNESS
Ciarra called, looking for the results of her recent mammogram done at [facility].  I reviewed her benign results with her. She will follow up with a mammogram in a year. Support provided.   
[FreeTextEntry8] : Patient history of hypertension and morbid obesity multiple spine surgeries he is here with a four-day history of cough and congestion productive of very scant sputum he feels he's been wheezing lately no fever no chills no major purulent sputum production chest x-ray done today is within normal limits

## 2021-06-07 PROBLEM — M19.90 UNSPECIFIED OSTEOARTHRITIS, UNSPECIFIED SITE: Chronic | Status: ACTIVE | Noted: 2021-05-19

## 2021-06-15 ENCOUNTER — RX RENEWAL (OUTPATIENT)
Age: 76
End: 2021-06-15

## 2021-06-17 ENCOUNTER — APPOINTMENT (OUTPATIENT)
Dept: ORTHOPEDIC SURGERY | Facility: CLINIC | Age: 76
End: 2021-06-17
Payer: MEDICARE

## 2021-06-17 PROCEDURE — 20610 DRAIN/INJ JOINT/BURSA W/O US: CPT | Mod: LT

## 2021-06-21 ENCOUNTER — RX RENEWAL (OUTPATIENT)
Age: 76
End: 2021-06-21

## 2021-06-21 NOTE — PROCEDURE
[de-identified] : Consent: \par At this time, I have recommended an injection to the left knee.  The risks and benefits of the procedure were discussed with the patient in detail.  Upon verbal consent of the patient, we proceeded with the injection as noted below.  \par \par Procedure:  \par After a sterile prep, the patient underwent an injection of 9 cc of 1% Lidocaine without epinephrine and 1 cc of Kenalog into the left knee.  The patient tolerated the procedure well.  There were no complications.  \par \par

## 2021-06-21 NOTE — PHYSICAL EXAM
[de-identified] : Left Knee: Range of Motion in Degrees	\par 	                  Claimant:	Normal:	\par Flexion Active	  135 	                135-degrees	\par Flexion Passive	  135	                135-degrees	\par Extension Active	  0-5	                0-5-degrees	\par Extension Passive	  0-5	                0-5-degrees	\par \par No weakness to flexion/extension.  No evidence of instability in the AP plane or varus or valgus stress.  Negative  Lachman.  Negative pivot shift.  Negative anterior drawer test.  Negative posterior drawer test.  Negative Dickson.  Negative Apley grind.  No medial or lateral joint line tenderness.  Positive tenderness over the medial and lateral facet of the patella.  Positive patellofemoral crepitations.  No lateral tilting patella.  No patella apprehension.  Positive crepitation in the medial and lateral femoral condyle.  No proximal or distal swelling, edema or tenderness.  No gross motor or sensory deficits.  Mild intra-articular swelling.  2+ DP and PT pulses.  No varus or valgus malalignment.  Skin is intact.  No rashes, scars or lesions.  \par \par Right Knee: Range of Motion in Degrees\par 	\par 	                  Claimant:    Normal:	\par Flexion Active	    135 	    135-degrees	\par Flexion Passive	    135	    135-degrees	\par Extension Active	    0-5	    0-5-degrees	\par Extension Passive	    0-5	    0-5-degrees	\par \par No weakness to flexion/extension.  No evidence of instability in the AP plane or varus or valgus stress.  Negative  Lachman.  Negative pivot shift.  Negative anterior drawer test.  Negative posterior drawer test.  Negative Dickson.  Negative Apley grind.  No medial or lateral joint line tenderness.  No tenderness over the medial and lateral facet of the patella.  No patellofemoral crepitations.  No lateral tilting patella.  No patellar apprehension.  No crepitation in the medial and lateral femoral condyle.  No proximal or distal swelling, edema or tenderness.  No gross motor or sensory deficits.  No intra-articular swelling.  2+ DP and PT pulses. No varus or valgus malalignment.  Skin is intact.  No rashes, scars or lesions. \par \par   [de-identified] : Gait and Station:  Ambulating with a slightly antalgic to antalgic gait.  Normal Station.  [de-identified] : Appearance:  Well developed, well-nourished male in no acute distress.\par   [de-identified] : Radiographs, one to two views of the left knee, reveal more osteoarthritis on the medial side.

## 2021-06-21 NOTE — DISCUSSION/SUMMARY
[de-identified] : At this time, I recommended ice and elevation for the left knee osteoarthritis.  We are going to order GenVisc injections and he will return to the office next week for the GenVisc injection.\par

## 2021-06-21 NOTE — ADDENDUM
[FreeTextEntry1] : This note was written by Andreas Cavanaugh on 05/11/2021, acting as a scribe for DONTE LOPEZ, DAVID/L, PA

## 2021-06-23 ENCOUNTER — APPOINTMENT (OUTPATIENT)
Dept: ORTHOPEDIC SURGERY | Facility: CLINIC | Age: 76
End: 2021-06-23
Payer: MEDICARE

## 2021-06-23 VITALS
WEIGHT: 260 LBS | HEIGHT: 68 IN | HEART RATE: 88 BPM | DIASTOLIC BLOOD PRESSURE: 69 MMHG | SYSTOLIC BLOOD PRESSURE: 124 MMHG | BODY MASS INDEX: 39.4 KG/M2

## 2021-06-23 PROCEDURE — 73030 X-RAY EXAM OF SHOULDER: CPT | Mod: RT

## 2021-06-23 PROCEDURE — 99213 OFFICE O/P EST LOW 20 MIN: CPT

## 2021-06-24 ENCOUNTER — APPOINTMENT (OUTPATIENT)
Dept: ORTHOPEDIC SURGERY | Facility: CLINIC | Age: 76
End: 2021-06-24
Payer: MEDICARE

## 2021-06-24 PROCEDURE — 20610 DRAIN/INJ JOINT/BURSA W/O US: CPT | Mod: LT

## 2021-06-24 NOTE — PROCEDURE
[de-identified] : Physical Examination:\par Left Knee\par Range of Motion in Degrees	\par 	                  Claimant:	Normal:	\par Flexion Active	  135 	                135-degrees	\par Flexion Passive	  135	                135-degrees	\par Extension Active	  0-5	                0-5-degrees	\par Extension Passive	  0-5	                0-5-degrees	\par \par No weakness to flexion/extension.  No evidence of instability in the AP plane or varus or valgus stress.  Negative Lachman.  Negative pivot shift.  Negative anterior drawer test.  Negative posterior drawer test.  Negative Dickson.  Negative Apley grind.  No medial or lateral joint line tenderness.  Positive tenderness over the medial and lateral facet of the patella.  Positive patellofemoral crepitations.  No lateral tilting patella.  No patellar apprehension.  Positive crepitation in the medial and lateral femoral condyle.  No proximal or distal swelling, edema or tenderness.  No gross motor or sensory deficits.  Mild intra-articular swelling.  2+ DP and PT pulses. No varus or valgus malalignment.  Skin is intact.  No rashes, scars or lesions.\par \par Impression: \par Osteoarthritis of the left knee\par \par Consent:\par The risks and benefits of the procedure were discussed with the patient in detail.  Upon verbal consent of the patient, we proceeded with the GenVisc 850 injection as noted below.\par \par Procedure:\par After a sterile prep, the patient underwent a GenVisc 850 injection of 25 mg of Sodium Hyaluronate in a 2.5 ML syringe into the left knee.  The patient tolerated the procedure well.  There were no complications.  \par \par NDC #:  [address]-82\par Lot #:    [id number]\par Expiration: 10/31/2023\par \par Plan:\par The patient will be reassessed in a week for the next GenVisc 850 injection for osteoarthritis of the left knee.  I have recommended ice and elevation.\par

## 2021-06-30 NOTE — ADDENDUM
[FreeTextEntry1] : This note was written by Rebecca Streeter on 06/28/2021 acting as a scribe for KIM DUBON III, MD

## 2021-06-30 NOTE — PROCEDURE
[de-identified] : Physical Examination:\par Left Knee\par Range of Motion in Degrees	\par 	 Claimant:	Normal:	\par Flexion Active	 135 	 135-degrees	\par Flexion Passive	 135	 135-degrees	\par Extension Active	 0-5	 0-5-degrees	\par Extension Passive	 0-5	 0-5-degrees	\par \par No weakness to flexion/extension. No evidence of instability in the AP plane or varus or valgus stress. Negative Lachman. Negative pivot shift. Negative anterior drawer test. Negative posterior drawer test. Negative Dickson. Negative Apley grind. No medial or lateral joint line tenderness. Positive tenderness over the medial and lateral facet of the patella. Positive patellofemoral crepitations. No lateral tilting patella. No patellar apprehension. Positive crepitation in the medial and lateral femoral condyle. No proximal or distal swelling, edema or tenderness. No gross motor or sensory deficits. Mild intra-articular swelling. 2+ DP and PT pulses. No varus or valgus malalignment. Skin is intact. No rashes, scars or lesions.\par \par Impression: \par Osteoarthritis of the left knee\par \par Consent:\par The risks and benefits of the procedure were discussed with the patient in detail. Upon verbal consent of the patient, we proceeded with the GenVisc 850 injection as noted below.\par \par Procedure:\par After a sterile prep, the patient underwent a GenVisc 850 injection of 25 mg of Sodium Hyaluronate in a 2.5 ML syringe into the left knee. The patient tolerated the procedure well. There were no complications. \par \par NDC #: [address]-81\par Lot #: [id number]\par Expiration: 10/31/2023\par \par Plan:\par The patient will be reassessed in a week for the next GenVisc 850 injection for osteoarthritis of the left knee. I have recommended ice and elevation.\par

## 2021-07-01 ENCOUNTER — APPOINTMENT (OUTPATIENT)
Dept: ORTHOPEDIC SURGERY | Facility: CLINIC | Age: 76
End: 2021-07-01
Payer: MEDICARE

## 2021-07-01 PROCEDURE — 20610 DRAIN/INJ JOINT/BURSA W/O US: CPT | Mod: LT

## 2021-07-01 NOTE — HISTORY OF PRESENT ILLNESS
[de-identified] : The patient comes in today with complaints of pain to his right shoulder.  He is feeling a bit better.  He has no pain at rest.  It is only a level of 4 when he is moving his shoulder.

## 2021-07-01 NOTE — PHYSICAL EXAM
[de-identified] : Ambulating with a normal gait.  Station:  Normal.  [de-identified] : Left Shoulder: \par Range of Motion in Degrees:   	\par    	                        Claimant:          Normal:  	\par Abduction (Active)  	180  	180 degrees  	\par Abduction (Passive)  	180  	180 degrees  	\par Forward elevation (Active):  	180  	180 degrees  	\par Forward elevation (Passive):  180  	180 degrees  	\par External rotation (Active):  	45  	45 degrees  	\par External rotation (Passive):  	45  	45 degrees  	\par Internal rotation (Active):  	L-1  	L-1  	\par Internal rotation (Passive):  	L-1  	L-1  	\par \par No motor weakness to internal rotation, external rotation or abduction in the scapular plane.  Negative crank test.  Negative O’Patrick’s test.  Negative Speed’s test. Negative Yergason’s test.  Negative cross arm test.  No tenderness to palpation at the AC joint. Negative Hawkin’s sign.  Negative Neer’s sign.  Negative apprehension. Negative sulcus sign.  No gross neurological or vascular deficits distally.  2+ radial and ulnar pulses.  Skin is intact.  No rashes, scars or lesions.  No extra-articular swelling or tenderness. \par \par Right Shoulder: \par Shoulder: Range of Motion in Degrees:  	\par 	                                  Claimant:	Normal:	\par Abduction (Active)	                  180	               180 degrees	\par Abduction (Passive)	  180	               180 degrees	\par Forward elevation (Active):	  180	               180 degrees	\par Forward elevation (Passive):	  180	               180 degrees	\par External rotation (Active):	   45	               45 degrees	\par External rotation (Passive):	   45	               45 degrees	\par Internal rotation (Active):	   L-1	               L-1	\par Internal rotation (Passive):	   L-1	               L-1	\par \par Diffuse crepitations and tenderness throughout range of motion.  Pain and swelling throughout range of motion, more significant at extremes.  No motor weakness to internal rotation, external rotation or abduction in the scapular plane.  Negative crank test.  Negative O’Patrick’s test.  Negative Speed’s test.  Negative Yergason’s test.  Negative cross arm test.  Tenderness to palpation at the AC joint. Negative Hawkin’s sign.  Negative Neer’s sign.  Negative apprehension. Negative sulcus sign.  No gross neurological or vascular deficits distally.  Skin is intact.  No rashes, scars or lesions.  2+ radial and ulnar pulses. No extra-articular swelling or tenderness.\par  [de-identified] : General Appearance:  Well-developed, well-nourished male in no acute distress. \par  [de-identified] : Radiographs, two views of the right shoulder, show mild osteoarthritis.

## 2021-07-01 NOTE — DISCHARGE NOTE NURSING/CASE MANAGEMENT/SOCIAL WORK - HAS THE PATIENT RECEIVED THE INFLUENZA VACCINE THIS SEASON?
H&P reviewed  After examining the patient I find no changes in the patients condition since the H&P had been written      Vitals:    07/01/21 0821   BP: 115/70   Pulse: 97   Resp: 13   Temp: 97 8 °F (36 6 °C)   SpO2: 99%
yes...

## 2021-07-01 NOTE — ADDENDUM
[FreeTextEntry1] : This note was written by Laurie Page on 06/25/2021 acting as scribe for Sunny Carballo III, MD

## 2021-07-07 NOTE — ADDENDUM
[FreeTextEntry1] : This note was written by Andreas Cavanaugh on 07/06/2021, acting as a scribe for Sunyn Carballo III, MD

## 2021-07-07 NOTE — PROCEDURE
[de-identified] : \par Physical Examination:\par Left Knee: Range of Motion in Degrees	\par 	                  Claimant:	Normal:	\par Flexion Active	  135 	                135-degrees	\par Flexion Passive	  135	                135-degrees	\par Extension Active	  0-5	                0-5-degrees	\par Extension Passive	  0-5	                0-5-degrees	\par \par No weakness to flexion/extension.  No evidence of instability in the AP plane or varus or valgus stress.  Negative  Lachman.  Negative pivot shift.  Negative anterior drawer test.  Negative posterior drawer test.  Negative Dickson.  Negative Apley grind.  No medial or lateral joint line tenderness.  Positive tenderness over the medial and lateral facet of the patella.  Positive patellofemoral crepitations.  No lateral tilting patella.  No patella apprehension.  Positive crepitation in the medial and lateral femoral condyle.  No proximal or distal swelling, edema or tenderness.  No gross motor or sensory deficits.  Mild intra-articular swelling.  2+ DP and PT pulses.  No varus or valgus malalignment.  Skin is intact.  No rashes, scars or lesions.  \par \par Impression: \par Osteoarthritis of left knee \par \par Consent:\par The risks and benefits of the procedure were discussed with the patient in detail.  Upon verbal consent of the patient, we proceeded with the GenVisc-850 injection as noted below.  \par \par Procedure:\par After a sterile prep, the patient underwent a GenVisc-850 injection of 25 mg of Sodium Hyaluronate in a 2.5 mL syringe into the left knee.  The patient tolerated the procedure well.  There were no complications.  \par \par NDC# [address]-76\par Lot#:  P-[id number] \par Exp. Date:  10/31/2023\par \par Plan:\par I have recommended ice and elevation.  The patient will be reassessed in one week for the next GenVisc-850 injection for osteoarthritis of the left knee.\par

## 2021-07-08 ENCOUNTER — APPOINTMENT (OUTPATIENT)
Dept: ORTHOPEDIC SURGERY | Facility: CLINIC | Age: 76
End: 2021-07-08
Payer: MEDICARE

## 2021-07-08 PROCEDURE — 20610 DRAIN/INJ JOINT/BURSA W/O US: CPT | Mod: LT

## 2021-07-13 NOTE — ADDENDUM
[FreeTextEntry1] : This note was written by Ale Villarreal on 07/13/2021 acting as scribe for Sunny Carballo III, MD

## 2021-07-13 NOTE — PROCEDURE
[de-identified] : Physical Examination:\par Left Knee: Range of Motion in Degrees	\par 	                  Claimant:	Normal:	\par Flexion Active	  135 	                135-degrees	\par Flexion Passive	  135	                135-degrees	\par Extension Active	  0-5	                0-5-degrees	\par Extension Passive	  0-5	                0-5-degrees	\par \par No weakness to flexion/extension.  No evidence of instability in the AP plane or varus or valgus stress.  Negative  Lachman.  Negative pivot shift.  Negative anterior drawer test.  Negative posterior drawer test.  Negative Dickson.  Negative Apley grind.  No medial or lateral joint line tenderness.  Positive tenderness over the medial and lateral facet of the patella.  Positive patellofemoral crepitations.  No lateral tilting patella.  No patella apprehension.  Positive crepitation in the medial and lateral femoral condyle.  No proximal or distal swelling, edema or tenderness.  No gross motor or sensory deficits.  Mild intra-articular swelling.  2+ DP and PT pulses.  No varus or valgus malalignment.  Skin is intact.  No rashes, scars or lesions.  \par \par Impression: \par Osteoarthritis of left knee \par \par Consent:\par The risks and benefits of the procedure were discussed with the patient in detail.  Upon verbal consent of the patient, we proceeded with the GenVisc-850 injection as noted below.  \par \par Procedure:\par After a sterile prep, the patient underwent a GenVisc-850 injection of 25 mg of Sodium Hyaluronate in a 2.5 mL syringe into the left knee.  The patient tolerated the procedure well.  There were no complications.  \par \par NDC# [address]-02\par Lot#:  [id number] \par Exp. Date:  10/31/23\par \par Plan:\par I have recommended ice and elevation.  The patient will be reassessed in one week for the next GenVisc-850 injection for osteoarthritis of the left knee.\par

## 2021-07-15 ENCOUNTER — APPOINTMENT (OUTPATIENT)
Dept: ORTHOPEDIC SURGERY | Facility: CLINIC | Age: 76
End: 2021-07-15
Payer: MEDICARE

## 2021-07-15 PROCEDURE — 20610 DRAIN/INJ JOINT/BURSA W/O US: CPT | Mod: LT

## 2021-07-20 NOTE — ADDENDUM
[FreeTextEntry1] : This note was written by Andreas Cavanaugh on 07/20/2021, acting as a scribe for Sunny Carballo III, MD

## 2021-07-20 NOTE — PROCEDURE
[de-identified] : \par Physical Examination:\par Left Knee: Range of Motion in Degrees	\par 	                  Claimant:	Normal:	\par Flexion Active	  135 	                135-degrees	\par Flexion Passive	  135	                135-degrees	\par Extension Active	  0-5	                0-5-degrees	\par Extension Passive	  0-5	                0-5-degrees	\par \par No weakness to flexion/extension.  No evidence of instability in the AP plane or varus or valgus stress.  Negative  Lachman.  Negative pivot shift.  Negative anterior drawer test.  Negative posterior drawer test.  Negative Dickson.  Negative Apley grind.  No medial or lateral joint line tenderness.  Positive tenderness over the medial and lateral facet of the patella.  Positive patellofemoral crepitations.  No lateral tilting patella.  No patella apprehension.  Positive crepitation in the medial and lateral femoral condyle.  No proximal or distal swelling, edema or tenderness.  No gross motor or sensory deficits.  Mild intra-articular swelling.  2+ DP and PT pulses.  No varus or valgus malalignment.  Skin is intact.  No rashes, scars or lesions.  \par \par Impression: \par Osteoarthritis of left knee \par \par Consent:\par The risks and benefits of the procedure were discussed with the patient in detail.  Upon verbal consent of the patient, we proceeded with the GenVisc-850 injection as noted below.  \par \par Procedure:\par After a sterile prep, the patient underwent a GenVisc-850 injection of 25 mg of Sodium Hyaluronate in a 2.5 mL syringe into the left knee.  The patient tolerated the procedure well.  There were no complications.\par \par NDC#:  [address]-00  \par Lot#:    [id number]\par Exp Date:  10/31/2023\par \par Plan:\par I have recommended ice and elevation.  The patient will be reassessed in 6-8 weeks for the left knee osteoarthritis.   \par

## 2021-08-18 ENCOUNTER — RX RENEWAL (OUTPATIENT)
Age: 76
End: 2021-08-18

## 2021-08-26 ENCOUNTER — APPOINTMENT (OUTPATIENT)
Dept: ORTHOPEDIC SURGERY | Facility: CLINIC | Age: 76
End: 2021-08-26
Payer: MEDICARE

## 2021-08-26 PROCEDURE — 99441: CPT | Mod: 95

## 2021-09-01 ENCOUNTER — APPOINTMENT (OUTPATIENT)
Dept: ORTHOPEDIC SURGERY | Facility: CLINIC | Age: 76
End: 2021-09-01
Payer: MEDICARE

## 2021-09-01 VITALS
DIASTOLIC BLOOD PRESSURE: 62 MMHG | SYSTOLIC BLOOD PRESSURE: 132 MMHG | HEART RATE: 73 BPM | WEIGHT: 260 LBS | BODY MASS INDEX: 39.4 KG/M2 | HEIGHT: 68 IN

## 2021-09-01 DIAGNOSIS — M25.511 PAIN IN RIGHT SHOULDER: ICD-10-CM

## 2021-09-01 DIAGNOSIS — G89.29 PAIN IN RIGHT SHOULDER: ICD-10-CM

## 2021-09-01 PROCEDURE — 73560 X-RAY EXAM OF KNEE 1 OR 2: CPT | Mod: LT

## 2021-09-01 PROCEDURE — 99213 OFFICE O/P EST LOW 20 MIN: CPT

## 2021-09-16 NOTE — ADDENDUM
[FreeTextEntry1] : This note was written by Laurie Page on 09/16/2021 acting as scribe for Sunny Carballo III, MD

## 2021-09-16 NOTE — DISCUSSION/SUMMARY
[de-identified] : At this time, due to persistent complaints and failure to relieve, I have recommended an MRI of the left knee and right shoulder.  He will be reassessed once that is done.  
Yes

## 2021-09-16 NOTE — HISTORY OF PRESENT ILLNESS
[de-identified] : The patient comes in today with persistent complaints of pain to his left knee and right shoulder.

## 2021-09-16 NOTE — PHYSICAL EXAM
[de-identified] : Right Shoulder: \par Range of Motion in Degrees:  \par 	                        Claimant:	 Normal:	\par Abduction (Active)	                180	180 degrees	\par Abduction (Passive)               180	180 degrees	\par Forward elevation (Active):	180	180 degrees	\par Forward elevation (Passive):	180	180 degrees	\par External rotation (Active):	45	45 degrees	\par External rotation (Passive):	45	45 degrees	\par Internal rotation (Active):	L-1	L-1	\par Internal rotation (Passive):	L-1	L-1	\par \par No motor weakness to internal rotation, external rotation or abduction in the scapular plane.  Negative crank test.  Negative O’Patrick’s test.  Negative Speed’s test. Negative Yergason’s test.  Negative cross arm test.  No tenderness to palpation at the AC joint. Positive Hawkin’s sign.  Positive Neer’s sign. Negative apprehension. Negative sulcus sign.  No gross neurological or vascular deficits distally.  Skin is intact.  No rashes, scars or lesions. radial and ulnar pulses. No extra-articular swelling or tenderness.\par \par Left Knee: \par Range of Motion in Degrees	\par 	                  Claimant:	Normal:	\par Flexion Active	  135 	               135-degrees	\par Flexion Passive	  135	               135-degrees	\par Extension Active	  0-5	               0-5-degrees	\par Extension Passive     0-5	               0-5-degrees	\par \par No weakness to flexion/extension.  No evidence of instability in the AP plane or varus or valgus stress.  Negative  Lachman.  Negative pivot shift.  Negative anterior drawer test.  Negative posterior drawer test.  Positive Dickson.  Positive Apley grind.  Positive medial joint line tenderness.  Negative lateral joint line tenderness.  Positive tenderness over the medial and lateral facet of the patella.  Positive patellofemoral crepitations.  No lateral tilting patella.  No patellar apprehension.  Positive crepitation in the medial and lateral femoral condyle.  No proximal or distal swelling, edema or tenderness.  No gross motor or sensory deficits.  Mild intra-articular swelling.  2+ DP and PT pulses.  No varus or valgus malalignment.  Skin is intact.  No rashes, scars or lesions. \par   [de-identified] : Ambulating with a slightly antalgic to antalgic gait.  Station:  Normal.  [de-identified] : General Appearance:  Well-developed, well-nourished male in no acute distress. \par  [de-identified] : Radiographs, two views of the left knee, show mild degenerative changes.

## 2021-09-18 ENCOUNTER — RX RENEWAL (OUTPATIENT)
Age: 76
End: 2021-09-18

## 2021-09-18 RX ORDER — HYALURONATE SODIUM 10 MG/ML
25 SYRINGE (ML) INTRAARTICULAR
Qty: 5 | Refills: 0 | Status: DISCONTINUED | OUTPATIENT
Start: 2021-05-07 | End: 2021-09-18

## 2021-09-27 ENCOUNTER — APPOINTMENT (OUTPATIENT)
Dept: ORTHOPEDIC SURGERY | Facility: CLINIC | Age: 76
End: 2021-09-27
Payer: MEDICARE

## 2021-09-27 VITALS
HEART RATE: 84 BPM | DIASTOLIC BLOOD PRESSURE: 66 MMHG | BODY MASS INDEX: 39.4 KG/M2 | WEIGHT: 260 LBS | SYSTOLIC BLOOD PRESSURE: 125 MMHG | HEIGHT: 68 IN

## 2021-09-27 DIAGNOSIS — M17.12 UNILATERAL PRIMARY OSTEOARTHRITIS, LEFT KNEE: ICD-10-CM

## 2021-09-27 DIAGNOSIS — M19.011 PRIMARY OSTEOARTHRITIS, RIGHT SHOULDER: ICD-10-CM

## 2021-09-27 DIAGNOSIS — M75.101 UNSPECIFIED ROTATOR CUFF TEAR OR RUPTURE OF RIGHT SHOULDER, NOT SPECIFIED AS TRAUMATIC: ICD-10-CM

## 2021-09-27 PROCEDURE — 99214 OFFICE O/P EST MOD 30 MIN: CPT | Mod: 25

## 2021-09-27 PROCEDURE — 20610 DRAIN/INJ JOINT/BURSA W/O US: CPT | Mod: RT

## 2021-09-30 NOTE — HISTORY OF PRESENT ILLNESS
[de-identified] : The patient comes in today with persistent complaints of pain in his right shoulder and left knee.

## 2021-09-30 NOTE — ADDENDUM
[FreeTextEntry1] : This note was written by Rebecca Streeter on 09/30/2021 acting as a scribe for KIM DUBON III, MD 138

## 2021-09-30 NOTE — PROCEDURE
[de-identified] : Consent: \par At this time, I have recommended an injection to the right shoulder.  The risks and benefits of the procedure were discussed with the patient in detail.  Upon verbal consent of the patient, we proceeded with the injection as noted below.  \par \par Procedure:  \par After a sterile prep, the patient underwent an injection of 9 cc of 1% Lidocaine without epinephrine and 1 cc of Kenalog into the right shoulder.  The patient tolerated the procedure well.  There were no complications.  \par

## 2021-09-30 NOTE — DISCUSSION/SUMMARY
[de-identified] : At this time, due to rotator cuff tear, osteoarthritis and atrophy of the right shoulder, I recommend ice, elevation and home therapeutic modalities.  As far as the left knee osteoarthritis with medial meniscus tear, I recommend arthroscopy, meniscectomy and debridement.  All the risks and benefits of the surgery, including, but not limited to, anesthesia, infection, nerve and/or vascular injury and need for further surgery, were all discussed with the patient at length.  In light of these, patient wishes to proceed.  Patient will be scheduled at this time.

## 2021-09-30 NOTE — PHYSICAL EXAM
[de-identified] : Left Knee: \par Range of Motion in Degrees	\par 	                  Claimant:	Normal:	\par Flexion Active	  135 	                135-degrees	\par Flexion Passive	  135	                135-degrees	\par Extension Active	  0-5	                0-5-degrees	\par Extension Passive	  0-5	                0-5-degrees	\par \par No weakness to flexion/extension.  No evidence of instability in the AP plane or varus or valgus stress.  Negative  Lachman.  Negative pivot shift.  Negative anterior drawer test.  Negative posterior drawer test.  Negative Dickosn.  Negative Apley grind.  No medial or lateral joint line tenderness.  Positive tenderness over the medial and lateral facet of the patella.  Positive patellofemoral crepitations.  No lateral tilting patella.  No patella apprehension.  Positive crepitation in the medial and lateral femoral condyle.  No proximal or distal swelling, edema or tenderness.  No gross motor or sensory deficits.  Mild intra-articular swelling.  2+ DP and PT pulses.  No varus or valgus malalignment.  Skin is intact.  No rashes, scars or lesions.  \par \par Right Shoulder:  \par Range of Motion in Degrees:	\par 	                                  Claimant:	Normal:	\par Abduction (Active)	                  180	               180 degrees	\par Abduction (Passive)	  180	               180 degrees	\par Forward elevation (Active):	  180	               180 degrees	\par Forward elevation (Passive):	  180	               180 degrees	\par External rotation (Active):	   45	               45 degrees	\par External rotation (Passive):	   45	               45 degrees	\par Internal rotation (Active):	   L-1	               L-1	\par Internal rotation (Passive):	   L-1	               L-1	\par  \par Weakness in abduction and external rotation. Negative crank test.  Negative O’Patrick’s test.  Negative Speed’s test. Negative Yergason’s test.  Negative cross arm test.  No tenderness to palpation at the AC joint. Positive Hawkin’s sign.  Positive Neer’s sign. Negative apprehension. Negative sulcus sign.  No gross neurological or vascular deficits distally.  Positive proximal biceps rupture. Positive Brian sign.  Positive ecchymosis. 2+ radial and ulnar pulses. No extra-articular swelling or tenderness.\par  [de-identified] : Ambulating with a slightly antalgic to antalgic gait.  Station:  Normal.  [de-identified] : Appearance:  Well-developed, well-nourished male in no acute distress.\par   [de-identified] : Review of the MRI of the right shoulder shows chronic rotator cuff tear with proximal biceps and muscle atrophy.\par \par Review of the MRI of the left knee reveals osteoarthritis with medial meniscus tear.

## 2021-10-07 ENCOUNTER — OUTPATIENT (OUTPATIENT)
Dept: OUTPATIENT SERVICES | Facility: HOSPITAL | Age: 76
LOS: 1 days | End: 2021-10-07
Payer: MEDICARE

## 2021-10-07 ENCOUNTER — APPOINTMENT (OUTPATIENT)
Dept: FAMILY MEDICINE | Facility: CLINIC | Age: 76
End: 2021-10-07
Payer: MEDICARE

## 2021-10-07 VITALS
TEMPERATURE: 98 F | RESPIRATION RATE: 18 BRPM | HEART RATE: 84 BPM | WEIGHT: 261.91 LBS | SYSTOLIC BLOOD PRESSURE: 152 MMHG | HEIGHT: 68 IN | DIASTOLIC BLOOD PRESSURE: 70 MMHG | OXYGEN SATURATION: 98 %

## 2021-10-07 VITALS
HEART RATE: 84 BPM | WEIGHT: 260 LBS | SYSTOLIC BLOOD PRESSURE: 140 MMHG | TEMPERATURE: 98.6 F | OXYGEN SATURATION: 97 % | DIASTOLIC BLOOD PRESSURE: 64 MMHG | BODY MASS INDEX: 39.4 KG/M2 | HEIGHT: 68 IN | RESPIRATION RATE: 16 BRPM

## 2021-10-07 DIAGNOSIS — S83.242A OTHER TEAR OF MEDIAL MENISCUS, CURRENT INJURY, LEFT KNEE, INITIAL ENCOUNTER: ICD-10-CM

## 2021-10-07 DIAGNOSIS — Z98.890 OTHER SPECIFIED POSTPROCEDURAL STATES: Chronic | ICD-10-CM

## 2021-10-07 DIAGNOSIS — Z01.818 ENCOUNTER FOR OTHER PREPROCEDURAL EXAMINATION: ICD-10-CM

## 2021-10-07 LAB
ANION GAP SERPL CALC-SCNC: 3 MMOL/L — LOW (ref 5–17)
APTT BLD: 26.5 SEC — LOW (ref 27.5–35.5)
BASOPHILS # BLD AUTO: 0.06 K/UL — SIGNIFICANT CHANGE UP (ref 0–0.2)
BASOPHILS NFR BLD AUTO: 0.5 % — SIGNIFICANT CHANGE UP (ref 0–2)
BUN SERPL-MCNC: 45 MG/DL — HIGH (ref 7–23)
CALCIUM SERPL-MCNC: 9.2 MG/DL — SIGNIFICANT CHANGE UP (ref 8.5–10.1)
CHLORIDE SERPL-SCNC: 112 MMOL/L — HIGH (ref 96–108)
CO2 SERPL-SCNC: 26 MMOL/L — SIGNIFICANT CHANGE UP (ref 22–31)
CREAT SERPL-MCNC: 1.63 MG/DL — HIGH (ref 0.5–1.3)
EOSINOPHIL # BLD AUTO: 0.15 K/UL — SIGNIFICANT CHANGE UP (ref 0–0.5)
EOSINOPHIL NFR BLD AUTO: 1.3 % — SIGNIFICANT CHANGE UP (ref 0–6)
GLUCOSE SERPL-MCNC: 94 MG/DL — SIGNIFICANT CHANGE UP (ref 70–99)
HCT VFR BLD CALC: 37.4 % — LOW (ref 39–50)
HGB BLD-MCNC: 12.2 G/DL — LOW (ref 13–17)
IMM GRANULOCYTES NFR BLD AUTO: 0.7 % — SIGNIFICANT CHANGE UP (ref 0–1.5)
INR BLD: 0.93 RATIO — SIGNIFICANT CHANGE UP (ref 0.88–1.16)
LYMPHOCYTES # BLD AUTO: 2.34 K/UL — SIGNIFICANT CHANGE UP (ref 1–3.3)
LYMPHOCYTES # BLD AUTO: 20.4 % — SIGNIFICANT CHANGE UP (ref 13–44)
MCHC RBC-ENTMCNC: 30 PG — SIGNIFICANT CHANGE UP (ref 27–34)
MCHC RBC-ENTMCNC: 32.6 GM/DL — SIGNIFICANT CHANGE UP (ref 32–36)
MCV RBC AUTO: 91.9 FL — SIGNIFICANT CHANGE UP (ref 80–100)
MONOCYTES # BLD AUTO: 1.03 K/UL — HIGH (ref 0–0.9)
MONOCYTES NFR BLD AUTO: 9 % — SIGNIFICANT CHANGE UP (ref 2–14)
NEUTROPHILS # BLD AUTO: 7.8 K/UL — HIGH (ref 1.8–7.4)
NEUTROPHILS NFR BLD AUTO: 68.1 % — SIGNIFICANT CHANGE UP (ref 43–77)
PLATELET # BLD AUTO: 215 K/UL — SIGNIFICANT CHANGE UP (ref 150–400)
POTASSIUM SERPL-MCNC: 5 MMOL/L — SIGNIFICANT CHANGE UP (ref 3.5–5.3)
POTASSIUM SERPL-SCNC: 5 MMOL/L — SIGNIFICANT CHANGE UP (ref 3.5–5.3)
PROTHROM AB SERPL-ACNC: 10.8 SEC — SIGNIFICANT CHANGE UP (ref 10.6–13.6)
RBC # BLD: 4.07 M/UL — LOW (ref 4.2–5.8)
RBC # FLD: 13.5 % — SIGNIFICANT CHANGE UP (ref 10.3–14.5)
SODIUM SERPL-SCNC: 141 MMOL/L — SIGNIFICANT CHANGE UP (ref 135–145)
WBC # BLD: 11.46 K/UL — HIGH (ref 3.8–10.5)
WBC # FLD AUTO: 11.46 K/UL — HIGH (ref 3.8–10.5)

## 2021-10-07 PROCEDURE — 90662 IIV NO PRSV INCREASED AG IM: CPT

## 2021-10-07 PROCEDURE — 85610 PROTHROMBIN TIME: CPT

## 2021-10-07 PROCEDURE — 93005 ELECTROCARDIOGRAM TRACING: CPT

## 2021-10-07 PROCEDURE — G0008: CPT

## 2021-10-07 PROCEDURE — G0463: CPT | Mod: 25

## 2021-10-07 PROCEDURE — 85025 COMPLETE CBC W/AUTO DIFF WBC: CPT

## 2021-10-07 PROCEDURE — 99214 OFFICE O/P EST MOD 30 MIN: CPT

## 2021-10-07 PROCEDURE — 85730 THROMBOPLASTIN TIME PARTIAL: CPT

## 2021-10-07 PROCEDURE — 93010 ELECTROCARDIOGRAM REPORT: CPT

## 2021-10-07 PROCEDURE — 80048 BASIC METABOLIC PNL TOTAL CA: CPT

## 2021-10-07 PROCEDURE — 36415 COLL VENOUS BLD VENIPUNCTURE: CPT

## 2021-10-07 RX ORDER — ASCORBIC ACID 60 MG
1 TABLET,CHEWABLE ORAL
Qty: 0 | Refills: 0 | DISCHARGE

## 2021-10-07 RX ORDER — PREGABALIN 225 MG/1
1 CAPSULE ORAL
Qty: 0 | Refills: 0 | DISCHARGE

## 2021-10-07 RX ORDER — AMLODIPINE BESYLATE 2.5 MG/1
1 TABLET ORAL
Qty: 0 | Refills: 0 | DISCHARGE

## 2021-10-07 RX ORDER — LOSARTAN POTASSIUM 100 MG/1
1 TABLET, FILM COATED ORAL
Qty: 0 | Refills: 0 | DISCHARGE

## 2021-10-07 RX ORDER — ATORVASTATIN CALCIUM 80 MG/1
1 TABLET, FILM COATED ORAL
Qty: 0 | Refills: 0 | DISCHARGE

## 2021-10-07 NOTE — H&P PST ADULT - NSICDXPASTMEDICALHX_GEN_ALL_CORE_FT
PAST MEDICAL HISTORY:  Anxiety and depression     Arthritis     Bladder cancer Dx 2013    BPH (benign prostatic hyperplasia)     Bursitis of both hips     COVID-19 vaccine series completed Completed 2/2021--Booster 9/2/2021    HLD (hyperlipidemia)     HTN (hypertension)     Kidney stones     Morbid obesity     Neuropathy left upper leg    Torn meniscus left    Torn tendon right biceps

## 2021-10-07 NOTE — H&P PST ADULT - ASSESSMENT
75 y.o male scheduled for  Left Knee Arthroscopy Meniscectomy Debridement   Plan  1. Stop all NSAIDS, herbal supplements and vitamins for 7 days.  2. NPO at midnight.  3. Take the following medications Metoprolol, Losartan, Tamsulosin, Sertraline  with small sips of water on the morning of your procedure/surgery.  4. Use EZ sponges as directed  5. Labs, EKG as per surgeon  6. PMD Sunny Mariano  visit for optimization prior to surgery as per surgeon  7. COVID swab appt; 10/12/2021

## 2021-10-07 NOTE — H&P PST ADULT - HISTORY OF PRESENT ILLNESS
75 y.o obese male presents for PST with hx of left knee pain. Patient reports left knee pain started this past summer. He states he had pain on ambulation and knee instability. He followed with ortho and treated conservatively with steroid injections and gel injections with no relief. Patient states diagnostics revealed torn meniscus. He is now opting for surgical intervention. Scheduled for Left Knee Arthroscopy Meniscectomy Debridement

## 2021-10-08 DIAGNOSIS — S83.242A OTHER TEAR OF MEDIAL MENISCUS, CURRENT INJURY, LEFT KNEE, INITIAL ENCOUNTER: ICD-10-CM

## 2021-10-08 DIAGNOSIS — Z01.818 ENCOUNTER FOR OTHER PREPROCEDURAL EXAMINATION: ICD-10-CM

## 2021-10-08 NOTE — REVIEW OF SYSTEMS
[Shortness Of Breath] : shortness of breath [Dyspnea on Exertion] : dyspnea on exertion [Hesitancy] : hesitancy [Frequency] : frequency [Joint Pain] : joint pain [Joint Stiffness] : joint stiffness [Muscle Pain] : muscle pain [Muscle Weakness] : muscle weakness [Back Pain] : back pain [Joint Swelling] : joint swelling [Dizziness] : dizziness [Chills] : no chills [Fatigue] : no fatigue [Hot Flashes] : no hot flashes [Vision Problems] : no vision problems [Sore Throat] : no sore throat [Chest Pain] : no chest pain [Palpitations] : no palpitations [Claudication] : no  leg claudication [Orthopena] : no orthopnea [Paroxysmal Nocturnal Dyspnea] : no paroxysmal nocturnal dyspnea [Wheezing] : no wheezing [Cough] : no cough [Abdominal Pain] : no abdominal pain [Nausea] : no nausea [Constipation] : no constipation [Diarrhea] : no diarrhea [Vomiting] : no vomiting [Heartburn] : no heartburn [Melena] : no melena [Dysuria] : no dysuria [Incontinence] : no incontinence [Hematuria] : no hematuria [Headache] : no headache [Fainting] : no fainting [Confusion] : no confusion [Unsteady Walk] : no ataxia [Memory Loss] : no memory loss [Easy Bleeding] : no easy bleeding [Easy Bruising] : no easy bruising [Swollen Glands] : no swollen glands

## 2021-10-08 NOTE — ASSESSMENT
[High Risk Surgery - Intraperitoneal, Intrathoracic or Supringuinal Vascular Procedures] : High Risk Surgery - Intraperitoneal, Intrathoracic or Supringuinal Vascular Procedures - No (0) [Ischemic Heart Disease] : Ischemic Heart Disease - No (0) [Congestive Heart Failure] : Congestive Heart Failure - No (0) [Prior Cerebrovascular Accident or TIA] : Prior Cerebrovascular Accident or TIA - No (0) [Creatinine >= 2mg/dL (1 Point)] : Creatinine >= 2mg/dL - No (0) [Insulin-dependent Diabetic (1 Point)] : Insulin-dependent Diabetic - No (0) [0] : 0 , RCRI Class: I, Risk of Post-Op Cardiac Complications: 3.9%, 95% CI for Risk Estimate: 2.8% - 5.4% [Patient Optimized for Surgery] : Patient optimized for surgery [No Further Testing Recommended] : no further testing recommended [As per surgery] : as per surgery [FreeTextEntry4] : She is low risk surgery the patient is a low risk patient non-RCRI risk factors would include morbid obesity and obstructive obstructive sleep apnea recommendation is to proceed with surgery without further testing

## 2021-10-08 NOTE — HISTORY OF PRESENT ILLNESS
[No Pertinent Cardiac History] : no history of aortic stenosis, atrial fibrillation, coronary artery disease, recent myocardial infarction, or implantable device/pacemaker [Sleep Apnea] : sleep apnea [Unable to assess] : unable to assess [Aortic Stenosis] : no aortic stenosis [Atrial Fibrillation] : no atrial fibrillation [Coronary Artery Disease] : no coronary artery disease [Recent Myocardial Infarction] : no recent myocardial infarction [Implantable Device/Pacemaker] : no implantable device/pacemaker [Asthma] : no asthma [COPD] : no COPD [Smoker] : not a smoker [Family Member] : no family member with adverse anesthesia reaction/sudden death [Self] : no previous adverse anesthesia reaction [Chronic Anticoagulation] : no chronic anticoagulation [Chronic Kidney Disease] : no chronic kidney disease [Diabetes] : no diabetes [FreeTextEntry1] : stefanie knee arthroscopy [FreeTextEntry2] : october 15,2021 [FreeTextEntry3] : [facility] [FreeTextEntry4] : Patient is here for preoperative evaluation prior to a left knee arthroscopy this is due to recurrent pain and disability from this joint the patient's past medical history includes morbid obesity multiple arthritic issues including several spine surgeries also right shoulder surgery 7 years ago for right rotator cuff injury he also has hypertension which has been well controlled.  Gout and the remote history of cancer of the bladder approximately a year ago he underwent a major cardiac evaluation due to shortness of breath which included stress testing echocardiogram and all testing was negative for coronary artery disease does suffer from obstructive sleep apnea [FreeTextEntry6] : Patient has dyspnea attributed to his morbid obesity cardiac work-up has been negative [FreeTextEntry7] : Patient has had multiple EKGs the last one on our records does show abnormalities but these are stable abnormalities and the patient has been evaluated for them with stress test echocardiogram and cardiological evaluation that has been negative for significant coronary artery disease his symptoms of dyspnea and lightheadedness have been attributed to his morbid obesity [FreeTextEntry8] : To spinal and extremity orthopedic issues it is difficult to evaluate patient's metabolic capacity

## 2021-10-08 NOTE — RESULTS/DATA
[] : results reviewed [de-identified] : wbc and mild anemia mild elevation no consequence regarding surgery [de-identified] : wnl [de-identified] : chronic elevation of creatinine not consequential re surgery [de-identified] : stable as of jan 2021, thorough cardiac eval within last year [de-identified] : Patient had a thorough cardiac evaluation in the summer 2020 with unremarkable echocardiogram and the nuclear stress test also underwent a chest CT which showed insignificant calcifications

## 2021-10-08 NOTE — PHYSICAL EXAM
[No Acute Distress] : no acute distress [Well Nourished] : well nourished [Well Developed] : well developed [Normal Sclera/Conjunctiva] : normal sclera/conjunctiva [PERRL] : pupils equal round and reactive to light [Normal Outer Ear/Nose] : the outer ears and nose were normal in appearance [Normal Oropharynx] : the oropharynx was normal [No JVD] : no jugular venous distention [No Lymphadenopathy] : no lymphadenopathy [Supple] : supple [No Respiratory Distress] : no respiratory distress  [No Accessory Muscle Use] : no accessory muscle use [Clear to Auscultation] : lungs were clear to auscultation bilaterally [Normal Rate] : normal rate  [Regular Rhythm] : with a regular rhythm [No Murmur] : no murmur heard [No Edema] : there was no peripheral edema [Soft] : abdomen soft [Non Tender] : non-tender [Non-distended] : non-distended [No Masses] : no abdominal mass palpated [No HSM] : no HSM [Normal Bowel Sounds] : normal bowel sounds [Normal Supraclavicular Nodes] : no supraclavicular lymphadenopathy [Normal Posterior Cervical Nodes] : no posterior cervical lymphadenopathy [Normal Anterior Cervical Nodes] : no anterior cervical lymphadenopathy [No CVA Tenderness] : no CVA  tenderness [No Spinal Tenderness] : no spinal tenderness [No Rash] : no rash [Coordination Grossly Intact] : coordination grossly intact [No Focal Deficits] : no focal deficits [Normal Gait] : normal gait [Deep Tendon Reflexes (DTR)] : deep tendon reflexes were 2+ and symmetric [de-identified] : Morbid obesity

## 2021-10-12 ENCOUNTER — APPOINTMENT (OUTPATIENT)
Dept: DISASTER EMERGENCY | Facility: CLINIC | Age: 76
End: 2021-10-12

## 2021-10-12 ENCOUNTER — LABORATORY RESULT (OUTPATIENT)
Age: 76
End: 2021-10-12

## 2021-10-14 RX ORDER — ONDANSETRON 8 MG/1
4 TABLET, FILM COATED ORAL ONCE
Refills: 0 | Status: DISCONTINUED | OUTPATIENT
Start: 2021-10-15 | End: 2021-10-15

## 2021-10-14 RX ORDER — OXYCODONE HYDROCHLORIDE 5 MG/1
5 TABLET ORAL ONCE
Refills: 0 | Status: DISCONTINUED | OUTPATIENT
Start: 2021-10-15 | End: 2021-10-15

## 2021-10-14 RX ORDER — HYDROMORPHONE HYDROCHLORIDE 2 MG/ML
0.5 INJECTION INTRAMUSCULAR; INTRAVENOUS; SUBCUTANEOUS
Refills: 0 | Status: DISCONTINUED | OUTPATIENT
Start: 2021-10-15 | End: 2021-10-15

## 2021-10-14 RX ORDER — SODIUM CHLORIDE 9 MG/ML
1000 INJECTION, SOLUTION INTRAVENOUS
Refills: 0 | Status: DISCONTINUED | OUTPATIENT
Start: 2021-10-15 | End: 2021-10-15

## 2021-10-14 RX ORDER — FENTANYL CITRATE 50 UG/ML
50 INJECTION INTRAVENOUS
Refills: 0 | Status: DISCONTINUED | OUTPATIENT
Start: 2021-10-15 | End: 2021-10-15

## 2021-10-15 ENCOUNTER — OUTPATIENT (OUTPATIENT)
Dept: INPATIENT UNIT | Facility: HOSPITAL | Age: 76
LOS: 1 days | Discharge: HOME | End: 2021-10-15
Payer: MEDICARE

## 2021-10-15 ENCOUNTER — APPOINTMENT (OUTPATIENT)
Dept: ORTHOPEDIC SURGERY | Facility: HOSPITAL | Age: 76
End: 2021-10-15
Payer: MEDICARE

## 2021-10-15 ENCOUNTER — RESULT REVIEW (OUTPATIENT)
Age: 76
End: 2021-10-15

## 2021-10-15 VITALS
WEIGHT: 259.93 LBS | HEIGHT: 68 IN | OXYGEN SATURATION: 98 % | TEMPERATURE: 98 F | DIASTOLIC BLOOD PRESSURE: 76 MMHG | HEART RATE: 64 BPM | RESPIRATION RATE: 16 BRPM | SYSTOLIC BLOOD PRESSURE: 139 MMHG

## 2021-10-15 VITALS
RESPIRATION RATE: 16 BRPM | HEART RATE: 76 BPM | OXYGEN SATURATION: 99 % | SYSTOLIC BLOOD PRESSURE: 147 MMHG | TEMPERATURE: 97 F | DIASTOLIC BLOOD PRESSURE: 62 MMHG

## 2021-10-15 DIAGNOSIS — Z98.890 OTHER SPECIFIED POSTPROCEDURAL STATES: Chronic | ICD-10-CM

## 2021-10-15 DIAGNOSIS — M16.0 BILATERAL PRIMARY OSTEOARTHRITIS OF HIP: ICD-10-CM

## 2021-10-15 DIAGNOSIS — F41.9 ANXIETY DISORDER, UNSPECIFIED: ICD-10-CM

## 2021-10-15 DIAGNOSIS — E78.2 MIXED HYPERLIPIDEMIA: ICD-10-CM

## 2021-10-15 DIAGNOSIS — M17.12 UNILATERAL PRIMARY OSTEOARTHRITIS, LEFT KNEE: ICD-10-CM

## 2021-10-15 DIAGNOSIS — I12.9 HYPERTENSIVE CHRONIC KIDNEY DISEASE WITH STAGE 1 THROUGH STAGE 4 CHRONIC KIDNEY DISEASE, OR UNSPECIFIED CHRONIC KIDNEY DISEASE: ICD-10-CM

## 2021-10-15 DIAGNOSIS — S83.242A OTHER TEAR OF MEDIAL MENISCUS, CURRENT INJURY, LEFT KNEE, INITIAL ENCOUNTER: ICD-10-CM

## 2021-10-15 DIAGNOSIS — Z85.51 PERSONAL HISTORY OF MALIGNANT NEOPLASM OF BLADDER: ICD-10-CM

## 2021-10-15 DIAGNOSIS — M65.9 SYNOVITIS AND TENOSYNOVITIS, UNSPECIFIED: ICD-10-CM

## 2021-10-15 DIAGNOSIS — G47.33 OBSTRUCTIVE SLEEP APNEA (ADULT) (PEDIATRIC): ICD-10-CM

## 2021-10-15 DIAGNOSIS — E66.01 MORBID (SEVERE) OBESITY DUE TO EXCESS CALORIES: ICD-10-CM

## 2021-10-15 DIAGNOSIS — N18.30 CHRONIC KIDNEY DISEASE, STAGE 3 UNSPECIFIED: ICD-10-CM

## 2021-10-15 DIAGNOSIS — M19.011 PRIMARY OSTEOARTHRITIS, RIGHT SHOULDER: ICD-10-CM

## 2021-10-15 DIAGNOSIS — G57.92 UNSPECIFIED MONONEUROPATHY OF LEFT LOWER LIMB: ICD-10-CM

## 2021-10-15 DIAGNOSIS — Z98.1 ARTHRODESIS STATUS: ICD-10-CM

## 2021-10-15 DIAGNOSIS — E55.9 VITAMIN D DEFICIENCY, UNSPECIFIED: ICD-10-CM

## 2021-10-15 DIAGNOSIS — Z99.89 DEPENDENCE ON OTHER ENABLING MACHINES AND DEVICES: ICD-10-CM

## 2021-10-15 DIAGNOSIS — M10.9 GOUT, UNSPECIFIED: ICD-10-CM

## 2021-10-15 DIAGNOSIS — Z87.891 PERSONAL HISTORY OF NICOTINE DEPENDENCE: ICD-10-CM

## 2021-10-15 DIAGNOSIS — F32.A DEPRESSION, UNSPECIFIED: ICD-10-CM

## 2021-10-15 DIAGNOSIS — M23.204 DERANGEMENT OF UNSPECIFIED MEDIAL MENISCUS DUE TO OLD TEAR OR INJURY, LEFT KNEE: ICD-10-CM

## 2021-10-15 DIAGNOSIS — Z79.82 LONG TERM (CURRENT) USE OF ASPIRIN: ICD-10-CM

## 2021-10-15 DIAGNOSIS — N40.0 BENIGN PROSTATIC HYPERPLASIA WITHOUT LOWER URINARY TRACT SYMPTOMS: ICD-10-CM

## 2021-10-15 DIAGNOSIS — M23.222 DERANGEMENT OF POSTERIOR HORN OF MEDIAL MENISCUS DUE TO OLD TEAR OR INJURY, LEFT KNEE: ICD-10-CM

## 2021-10-15 PROCEDURE — 29881 ARTHRS KNE SRG MNISECTMY M/L: CPT | Mod: LT

## 2021-10-15 PROCEDURE — 88304 TISSUE EXAM BY PATHOLOGIST: CPT | Mod: 26

## 2021-10-15 PROCEDURE — 88304 TISSUE EXAM BY PATHOLOGIST: CPT

## 2021-10-15 RX ORDER — ACETAMINOPHEN 500 MG
1000 TABLET ORAL ONCE
Refills: 0 | Status: COMPLETED | OUTPATIENT
Start: 2021-10-15 | End: 2021-10-15

## 2021-10-15 RX ADMIN — Medication 400 MILLIGRAM(S): at 09:00

## 2021-10-15 RX ADMIN — Medication 1000 MILLIGRAM(S): at 09:15

## 2021-10-15 NOTE — ASU DISCHARGE PLAN (ADULT/PEDIATRIC) - FOLLOW UP APPOINTMENTS
[address]    PATIENT'S NAME: Arby Graft   ATTENDING PHYSICIAN: Oneyda Ron M.D. OPERATING PHYSICIAN: Oneyda Ron M.D.    PATIENT ACCOUNT#:   [de-identified]    LOCATION:  [address] [place] [address] [facility]  MEDICAL RECORD #:   [id number]       DATE OF B attached it to the posterior wall with interrupted 0 PDS sutures. After this was complete, the hernia in the midline, the fascia was reapproximated with interrupted 0 PDS sutures.   Then, I used a 12 x 12 Soft Mesh to reinforce the entire lower abdominal w 419

## 2021-10-15 NOTE — ASU DISCHARGE PLAN (ADULT/PEDIATRIC) - CALL YOUR DOCTOR IF YOU HAVE ANY OF THE FOLLOWING:
Bleeding that does not stop/Numbness, tingling, color or temperature change to extremity/Nausea and vomiting that does not stop

## 2021-10-15 NOTE — ASU DISCHARGE PLAN (ADULT/PEDIATRIC) - ASU DC SPECIAL INSTRUCTIONSFT
Follow up with Dr Carballo in 7-10 days. Call office for appointment. Take medications as prescribed. Keep dressing clean, dry, and intact. Rest, ice, and elevate affected extremity.

## 2021-10-18 ENCOUNTER — RX RENEWAL (OUTPATIENT)
Age: 76
End: 2021-10-18

## 2021-10-25 ENCOUNTER — APPOINTMENT (OUTPATIENT)
Dept: ORTHOPEDIC SURGERY | Facility: CLINIC | Age: 76
End: 2021-10-25
Payer: MEDICARE

## 2021-10-25 DIAGNOSIS — Z98.890 OTHER SPECIFIED POSTPROCEDURAL STATES: ICD-10-CM

## 2021-10-25 PROCEDURE — 99024 POSTOP FOLLOW-UP VISIT: CPT

## 2021-10-27 PROBLEM — Z98.890 S/P ARTHROSCOPY OF LEFT KNEE: Status: ACTIVE | Noted: 2021-10-25

## 2021-10-27 NOTE — HISTORY OF PRESENT ILLNESS
[de-identified] : Post op Left Knee [de-identified] : The patient comes in today for his left knee.  He states he is definitely doing better. [de-identified] : Left Knee:\par The wound is healing well with no sign of infection. Sutures are removed.   [de-identified] : Status post left knee arthroscopy [de-identified] : The patient is doing well status post left knee arthroscopy.  He will be started on physical therapy.  He will be reassessed in four weeks.

## 2021-11-16 DIAGNOSIS — E66.09 OTHER OBESITY DUE TO EXCESS CALORIES: Chronic | ICD-10-CM

## 2021-11-16 RX ORDER — NYSTATIN AND TRIAMCINOLONE ACETONIDE 100000; 1 MG/G; MG/G
100000-0.1 CREAM TOPICAL 3 TIMES DAILY
Qty: 1 | Refills: 1 | Status: DISCONTINUED | COMMUNITY
Start: 2020-03-22 | End: 2021-11-16

## 2021-12-09 ENCOUNTER — RX RENEWAL (OUTPATIENT)
Age: 76
End: 2021-12-09

## 2022-01-06 PROBLEM — N40.0 BENIGN PROSTATIC HYPERPLASIA WITHOUT LOWER URINARY TRACT SYMPTOMS: Chronic | Status: ACTIVE | Noted: 2021-10-07

## 2022-01-06 PROBLEM — M70.71 OTHER BURSITIS OF HIP, RIGHT HIP: Chronic | Status: ACTIVE | Noted: 2021-10-07

## 2022-01-06 PROBLEM — C67.9 MALIGNANT NEOPLASM OF BLADDER, UNSPECIFIED: Chronic | Status: ACTIVE | Noted: 2019-05-23

## 2022-01-06 PROBLEM — S83.209A UNSPECIFIED TEAR OF UNSPECIFIED MENISCUS, CURRENT INJURY, UNSPECIFIED KNEE, INITIAL ENCOUNTER: Chronic | Status: ACTIVE | Noted: 2021-10-07

## 2022-01-06 PROBLEM — E66.01 MORBID (SEVERE) OBESITY DUE TO EXCESS CALORIES: Chronic | Status: ACTIVE | Noted: 2021-10-07

## 2022-01-06 PROBLEM — T14.8XXA OTHER INJURY OF UNSPECIFIED BODY REGION, INITIAL ENCOUNTER: Chronic | Status: ACTIVE | Noted: 2021-10-07

## 2022-01-06 PROBLEM — F41.9 ANXIETY DISORDER, UNSPECIFIED: Chronic | Status: ACTIVE | Noted: 2021-10-07

## 2022-01-06 PROBLEM — Z92.29 PERSONAL HISTORY OF OTHER DRUG THERAPY: Chronic | Status: ACTIVE | Noted: 2021-10-07

## 2022-01-06 PROBLEM — G62.9 POLYNEUROPATHY, UNSPECIFIED: Chronic | Status: ACTIVE | Noted: 2019-05-23

## 2022-01-07 ENCOUNTER — APPOINTMENT (OUTPATIENT)
Dept: CT IMAGING | Facility: CLINIC | Age: 77
End: 2022-01-07

## 2022-01-09 LAB
ALBUMIN SERPL ELPH-MCNC: 4.4 G/DL
ALP BLD-CCNC: 99 U/L
ALT SERPL-CCNC: 43 U/L
ANION GAP SERPL CALC-SCNC: 15 MMOL/L
AST SERPL-CCNC: 37 U/L
BASOPHILS # BLD AUTO: 0.07 K/UL
BASOPHILS NFR BLD AUTO: 0.8 %
BILIRUB SERPL-MCNC: 0.2 MG/DL
BUN SERPL-MCNC: 38 MG/DL
CALCIUM SERPL-MCNC: 9 MG/DL
CHLORIDE SERPL-SCNC: 107 MMOL/L
CO2 SERPL-SCNC: 20 MMOL/L
CREAT SERPL-MCNC: 2.25 MG/DL
EOSINOPHIL # BLD AUTO: 0.28 K/UL
EOSINOPHIL NFR BLD AUTO: 3 %
GLUCOSE SERPL-MCNC: 103 MG/DL
HCT VFR BLD CALC: 37.8 %
HGB BLD-MCNC: 11.7 G/DL
IMM GRANULOCYTES NFR BLD AUTO: 1.8 %
LYMPHOCYTES # BLD AUTO: 2.28 K/UL
LYMPHOCYTES NFR BLD AUTO: 24.6 %
MAN DIFF?: NORMAL
MCHC RBC-ENTMCNC: 30 PG
MCHC RBC-ENTMCNC: 31 GM/DL
MCV RBC AUTO: 96.9 FL
MONOCYTES # BLD AUTO: 0.68 K/UL
MONOCYTES NFR BLD AUTO: 7.4 %
NEUTROPHILS # BLD AUTO: 5.77 K/UL
NEUTROPHILS NFR BLD AUTO: 62.4 %
PLATELET # BLD AUTO: 272 K/UL
POTASSIUM SERPL-SCNC: 5.1 MMOL/L
PROT SERPL-MCNC: 6.9 G/DL
RBC # BLD: 3.9 M/UL
RBC # FLD: 13.6 %
SODIUM SERPL-SCNC: 142 MMOL/L
WBC # FLD AUTO: 9.25 K/UL

## 2022-01-11 ENCOUNTER — APPOINTMENT (OUTPATIENT)
Dept: CT IMAGING | Facility: CLINIC | Age: 77
End: 2022-01-11
Payer: MEDICARE

## 2022-01-11 ENCOUNTER — OUTPATIENT (OUTPATIENT)
Dept: OUTPATIENT SERVICES | Facility: HOSPITAL | Age: 77
LOS: 1 days | End: 2022-01-11
Payer: MEDICARE

## 2022-01-11 DIAGNOSIS — Z00.8 ENCOUNTER FOR OTHER GENERAL EXAMINATION: ICD-10-CM

## 2022-01-11 DIAGNOSIS — Z98.890 OTHER SPECIFIED POSTPROCEDURAL STATES: Chronic | ICD-10-CM

## 2022-01-11 PROCEDURE — 74176 CT ABD & PELVIS W/O CONTRAST: CPT | Mod: MH

## 2022-01-11 PROCEDURE — 74176 CT ABD & PELVIS W/O CONTRAST: CPT | Mod: 26,MH

## 2022-01-14 ENCOUNTER — RX RENEWAL (OUTPATIENT)
Age: 77
End: 2022-01-14

## 2022-01-15 ENCOUNTER — RX RENEWAL (OUTPATIENT)
Age: 77
End: 2022-01-15

## 2022-01-24 ENCOUNTER — APPOINTMENT (OUTPATIENT)
Dept: FAMILY MEDICINE | Facility: CLINIC | Age: 77
End: 2022-01-24
Payer: MEDICARE

## 2022-01-24 VITALS
BODY MASS INDEX: 39.71 KG/M2 | HEART RATE: 92 BPM | WEIGHT: 262 LBS | TEMPERATURE: 97.7 F | OXYGEN SATURATION: 97 % | DIASTOLIC BLOOD PRESSURE: 70 MMHG | RESPIRATION RATE: 16 BRPM | SYSTOLIC BLOOD PRESSURE: 134 MMHG | HEIGHT: 68 IN

## 2022-01-24 DIAGNOSIS — Z85.51 PERSONAL HISTORY OF MALIGNANT NEOPLASM OF BLADDER: ICD-10-CM

## 2022-01-24 LAB
BILIRUB UR QL STRIP: NEGATIVE
CLARITY UR: ABNORMAL
COLLECTION METHOD: NORMAL
GLUCOSE UR-MCNC: NEGATIVE
HCG UR QL: 0.2 EU/DL
HGB UR QL STRIP.AUTO: ABNORMAL
KETONES UR-MCNC: NEGATIVE
LEUKOCYTE ESTERASE UR QL STRIP: ABNORMAL
NITRITE UR QL STRIP: NEGATIVE
PH UR STRIP: 5
PROT UR STRIP-MCNC: ABNORMAL
SP GR UR STRIP: 1.02

## 2022-01-24 PROCEDURE — 81003 URINALYSIS AUTO W/O SCOPE: CPT | Mod: QW

## 2022-01-24 PROCEDURE — 99213 OFFICE O/P EST LOW 20 MIN: CPT | Mod: 25

## 2022-01-24 NOTE — HISTORY OF PRESENT ILLNESS
[FreeTextEntry8] : Patient here complaining of urinary frequency this started last night and yesterday he he urinated about 15 times overnight with mild dysuria and tremendous urgency he was treated for urinary tract infection about 10 days ago with a 5-day course of Bactrim with resolution of similar symptoms patient has a history of bladder cancer and has received installation chemotherapy on multiple occasions also has visited with urology several weeks ago and underwent CAT scan which was essentially normal he denies any fever chills night sweats no pain no change in bowel habits

## 2022-01-24 NOTE — PHYSICAL EXAM
[Normal Sclera/Conjunctiva] : normal sclera/conjunctiva [PERRL] : pupils equal round and reactive to light [No JVD] : no jugular venous distention [No Lymphadenopathy] : no lymphadenopathy [No Respiratory Distress] : no respiratory distress  [No Accessory Muscle Use] : no accessory muscle use [Clear to Auscultation] : lungs were clear to auscultation bilaterally [Normal Rate] : normal rate  [Regular Rhythm] : with a regular rhythm [No Murmur] : no murmur heard [No Edema] : there was no peripheral edema [Soft] : abdomen soft [Non Tender] : non-tender [No HSM] : no HSM [Normal Bowel Sounds] : normal bowel sounds [Normal Supraclavicular Nodes] : no supraclavicular lymphadenopathy [Normal Posterior Cervical Nodes] : no posterior cervical lymphadenopathy [Normal Anterior Cervical Nodes] : no anterior cervical lymphadenopathy [No CVA Tenderness] : no CVA  tenderness [No Spinal Tenderness] : no spinal tenderness [No Rash] : no rash [de-identified] : Morbid obesity

## 2022-01-24 NOTE — REVIEW OF SYSTEMS
[Fever] : no fever [Chills] : no chills [Fatigue] : no fatigue [Night Sweats] : no night sweats [Sore Throat] : no sore throat [Chest Pain] : no chest pain [Palpitations] : no palpitations [Orthopena] : no orthopnea [Paroxysmal Nocturnal Dyspnea] : no paroxysmal nocturnal dyspnea [Shortness Of Breath] : no shortness of breath [Cough] : no cough [Dyspnea on Exertion] : not dyspnea on exertion [Abdominal Pain] : no abdominal pain [Nausea] : no nausea [Constipation] : no constipation [Diarrhea] : no diarrhea [Vomiting] : no vomiting [Heartburn] : no heartburn [Melena] : no melena [Dysuria] : dysuria [Incontinence] : no incontinence [Nocturia] : nocturia [Hematuria] : no hematuria [Frequency] : frequency [Joint Pain] : no joint pain [Back Pain] : no back pain [Headache] : no headache [Dizziness] : no dizziness [Fainting] : no fainting

## 2022-01-24 NOTE — ASSESSMENT
[FreeTextEntry1] : Etiology of symptoms is uncertain urinalysis today shows a small leukocyte esterase negative nitrates and moderate blood there is negative we have contacted Dr. Mandujano and he will visit with the patient this afternoon recent CAT scan revealed no major issues patient has no systemic symptoms urine has been sent for culture and sensitivity

## 2022-01-27 LAB — BACTERIA UR CULT: ABNORMAL

## 2022-01-30 ENCOUNTER — RX RENEWAL (OUTPATIENT)
Age: 77
End: 2022-01-30

## 2022-02-10 ENCOUNTER — APPOINTMENT (OUTPATIENT)
Dept: FAMILY MEDICINE | Facility: CLINIC | Age: 77
End: 2022-02-10
Payer: MEDICARE

## 2022-02-10 ENCOUNTER — NON-APPOINTMENT (OUTPATIENT)
Age: 77
End: 2022-02-10

## 2022-02-10 VITALS
BODY MASS INDEX: 39.88 KG/M2 | SYSTOLIC BLOOD PRESSURE: 120 MMHG | TEMPERATURE: 97.5 F | RESPIRATION RATE: 16 BRPM | OXYGEN SATURATION: 95 % | DIASTOLIC BLOOD PRESSURE: 80 MMHG | HEART RATE: 72 BPM | HEIGHT: 68 IN

## 2022-02-10 VITALS
TEMPERATURE: 97.8 F | SYSTOLIC BLOOD PRESSURE: 120 MMHG | DIASTOLIC BLOOD PRESSURE: 80 MMHG | WEIGHT: 262.31 LBS | HEART RATE: 72 BPM | OXYGEN SATURATION: 95 % | RESPIRATION RATE: 16 BRPM | HEIGHT: 68 IN | BODY MASS INDEX: 39.75 KG/M2

## 2022-02-10 PROCEDURE — 99214 OFFICE O/P EST MOD 30 MIN: CPT

## 2022-02-10 RX ORDER — SULFAMETHOXAZOLE AND TRIMETHOPRIM 800; 160 MG/1; MG/1
800-160 TABLET ORAL TWICE DAILY
Qty: 14 | Refills: 0 | Status: DISCONTINUED | COMMUNITY
Start: 2021-12-31 | End: 2022-02-10

## 2022-02-10 NOTE — HISTORY OF PRESENT ILLNESS
[FreeTextEntry1] : Recurrent urinary tract infection [de-identified] : Patient is here in follow-up on his recurrent urinary tract infection he now has been seen by infectious disease and urology and is on a course of levofloxacin to treat Klebsiella pneumonia that grows out of his urine and which is shown to be sensitive to both ciprofloxacin and levofloxacin he is on approximately day 4 of 5 that regimen right now.  Is concerned however about the continuing to use fluoroquinolones for a long period of time which may may be necessary with regard to the possibility of QT prolongation and cardiac arrhythmia he is here for an EKG evaluation and further advice

## 2022-02-10 NOTE — REVIEW OF SYSTEMS
[Dysuria] : dysuria [Incontinence] : no incontinence [Hematuria] : no hematuria [Frequency] : frequency [Negative] : Respiratory

## 2022-02-10 NOTE — PHYSICAL EXAM
[Normal] : normal rate, regular rhythm, normal S1 and S2 and no murmur heard [de-identified] : Morbid obesity

## 2022-02-10 NOTE — ASSESSMENT
[FreeTextEntry1] : EKG today reveals a normal QT interval and unchanged morphology from prior tests the issue here is the risk for QT prolongation patient also takes sertraline and has chronic kidney disease.  His symptoms have mostly resolved now since he has been taking the Levaquin and infectious disease feels that a prolonged course of this would be necessary.  We have told the patient to taper and discontinue his sertraline he takes 50 mg now we will cut down to 25 mg for 3 days or so and then discontinue the medication we also will arrange for cardiac evaluation and suggest to ID that perhaps Cipro be considered as it has less of a tendency to cause QT prolongation than Levaquin we have gone over this thoroughly with the patient explained the issues to him and he is in agreement with our plan

## 2022-02-18 ENCOUNTER — NON-APPOINTMENT (OUTPATIENT)
Age: 77
End: 2022-02-18

## 2022-02-18 ENCOUNTER — APPOINTMENT (OUTPATIENT)
Dept: FAMILY MEDICINE | Facility: CLINIC | Age: 77
End: 2022-02-18
Payer: MEDICARE

## 2022-02-18 VITALS
BODY MASS INDEX: 39.71 KG/M2 | RESPIRATION RATE: 16 BRPM | WEIGHT: 262 LBS | TEMPERATURE: 97.6 F | HEART RATE: 79 BPM | SYSTOLIC BLOOD PRESSURE: 138 MMHG | DIASTOLIC BLOOD PRESSURE: 64 MMHG | HEIGHT: 68 IN | OXYGEN SATURATION: 95 %

## 2022-02-18 DIAGNOSIS — I10 ESSENTIAL (PRIMARY) HYPERTENSION: ICD-10-CM

## 2022-02-18 PROCEDURE — 99213 OFFICE O/P EST LOW 20 MIN: CPT

## 2022-02-18 NOTE — REVIEW OF SYSTEMS
[Fever] : no fever [Chills] : no chills [Fatigue] : no fatigue [Night Sweats] : no night sweats [Vision Problems] : no vision problems [Chest Pain] : no chest pain [Palpitations] : no palpitations [Lower Ext Edema] : no lower extremity edema [Orthopena] : no orthopnea [Shortness Of Breath] : no shortness of breath [Wheezing] : no wheezing [Cough] : no cough [Dyspnea on Exertion] : dyspnea on exertion [Abdominal Pain] : no abdominal pain [Nausea] : no nausea [Vomiting] : no vomiting [Dysuria] : no dysuria [Incontinence] : no incontinence [Hematuria] : no hematuria [Frequency] : no frequency [Dizziness] : no dizziness [Memory Loss] : no memory loss [Anxiety] : no anxiety [Depression] : depression

## 2022-02-18 NOTE — HISTORY OF PRESENT ILLNESS
[FreeTextEntry1] : Recurrent urinary tract infection [de-identified] : Patient is seen here in follow-up on his recurrent urinary tract infection he is currently taking levofloxacin 250 mg daily under the care of Dr. Staples of infectious disease his symptoms now have largely abated and he feels well no fevers no chills no dysuria he has discontinued his sertraline due to the possibility of that affecting his QT interval with the addition of a fluoroquinolone he has had no palpitations no chest pains no shortness of breath he is here today for repeat EKG and evaluation

## 2022-02-18 NOTE — ASSESSMENT
[FreeTextEntry1] : Patient is doing well his QT interval has not increased since his prior EKG about a week ago he has stopped the sertraline known 1 - affect psychologically to this point he continues on the Levaquin with no symptoms of urinary tract infection continue on Levaquin ID would like him to be on this for an additional 2 to 3 weeks we will reassess him from a cardiac and infectious disease point of view continue current treatment

## 2022-02-18 NOTE — PHYSICAL EXAM
[No Acute Distress] : no acute distress [Well Nourished] : well nourished [Well Developed] : well developed [Normal Sclera/Conjunctiva] : normal sclera/conjunctiva [PERRL] : pupils equal round and reactive to light [No JVD] : no jugular venous distention [No Lymphadenopathy] : no lymphadenopathy [No Respiratory Distress] : no respiratory distress  [No Accessory Muscle Use] : no accessory muscle use [Regular Rhythm] : with a regular rhythm [de-identified] : Morbid obesity

## 2022-02-24 ENCOUNTER — NON-APPOINTMENT (OUTPATIENT)
Age: 77
End: 2022-02-24

## 2022-02-24 ENCOUNTER — APPOINTMENT (OUTPATIENT)
Dept: FAMILY MEDICINE | Facility: CLINIC | Age: 77
End: 2022-02-24
Payer: MEDICARE

## 2022-02-24 VITALS
SYSTOLIC BLOOD PRESSURE: 142 MMHG | DIASTOLIC BLOOD PRESSURE: 68 MMHG | OXYGEN SATURATION: 95 % | TEMPERATURE: 97.8 F | BODY MASS INDEX: 39.71 KG/M2 | WEIGHT: 262 LBS | HEIGHT: 68 IN | RESPIRATION RATE: 18 BRPM | HEART RATE: 80 BPM

## 2022-02-24 DIAGNOSIS — N39.0 URINARY TRACT INFECTION, SITE NOT SPECIFIED: ICD-10-CM

## 2022-02-24 PROCEDURE — 93000 ELECTROCARDIOGRAM COMPLETE: CPT

## 2022-02-24 PROCEDURE — 99212 OFFICE O/P EST SF 10 MIN: CPT | Mod: 25

## 2022-02-24 NOTE — PHYSICAL EXAM
[No Acute Distress] : no acute distress [Well Nourished] : well nourished [Well Developed] : well developed [Well-Appearing] : well-appearing [No Respiratory Distress] : no respiratory distress  [Normal Rate] : normal rate  [Soft] : abdomen soft [Non Tender] : non-tender [No HSM] : no HSM [Normal Bowel Sounds] : normal bowel sounds [de-identified] : Obese

## 2022-02-24 NOTE — ASSESSMENT
[FreeTextEntry1] : Patient seems to doing well EKG shows no change in QT interval he has several PVCs but this has been a problem in the past prior to the situation we will repeat his renal function tests and he will be followed up in several weeks he is due to see infectious disease and urology again no no urinary tract symptomatology at this time

## 2022-02-24 NOTE — HISTORY OF PRESENT ILLNESS
[FreeTextEntry1] : Resistant urinary tract infection [de-identified] : Patient here for follow-up on his resistant urinary tract infection and his long-term antibiotic treatment with levofloxacin he is feeling well has had no symptoms of the UTI he is off sertraline and taking Levaquin daily he has had no cardiac related symptoms no palpitations no chest pain no shortness of breath EKG will be performed

## 2022-02-25 ENCOUNTER — RX RENEWAL (OUTPATIENT)
Age: 77
End: 2022-02-25

## 2022-02-27 LAB
ALBUMIN SERPL ELPH-MCNC: 4.4 G/DL
ANION GAP SERPL CALC-SCNC: 13 MMOL/L
BASOPHILS # BLD AUTO: 0.04 K/UL
BASOPHILS NFR BLD AUTO: 0.5 %
BUN SERPL-MCNC: 34 MG/DL
CALCIUM SERPL-MCNC: 9.5 MG/DL
CHLORIDE SERPL-SCNC: 109 MMOL/L
CO2 SERPL-SCNC: 19 MMOL/L
CREAT SERPL-MCNC: 1.49 MG/DL
EOSINOPHIL # BLD AUTO: 0.21 K/UL
EOSINOPHIL NFR BLD AUTO: 2.5 %
GLUCOSE SERPL-MCNC: 100 MG/DL
HCT VFR BLD CALC: 37.5 %
HGB BLD-MCNC: 11.6 G/DL
IMM GRANULOCYTES NFR BLD AUTO: 0.5 %
LYMPHOCYTES # BLD AUTO: 1.84 K/UL
LYMPHOCYTES NFR BLD AUTO: 22.3 %
MAN DIFF?: NORMAL
MCHC RBC-ENTMCNC: 28.9 PG
MCHC RBC-ENTMCNC: 30.9 GM/DL
MCV RBC AUTO: 93.3 FL
MONOCYTES # BLD AUTO: 0.75 K/UL
MONOCYTES NFR BLD AUTO: 9.1 %
NEUTROPHILS # BLD AUTO: 5.36 K/UL
NEUTROPHILS NFR BLD AUTO: 65.1 %
PHOSPHATE SERPL-MCNC: 3.3 MG/DL
PLATELET # BLD AUTO: 205 K/UL
POTASSIUM SERPL-SCNC: 4.6 MMOL/L
RBC # BLD: 4.02 M/UL
RBC # FLD: 13.9 %
SODIUM SERPL-SCNC: 142 MMOL/L
WBC # FLD AUTO: 8.24 K/UL

## 2022-03-04 ENCOUNTER — APPOINTMENT (OUTPATIENT)
Age: 77
End: 2022-03-04
Payer: MEDICARE

## 2022-03-04 VITALS
WEIGHT: 262 LBS | SYSTOLIC BLOOD PRESSURE: 126 MMHG | HEIGHT: 68 IN | BODY MASS INDEX: 39.71 KG/M2 | DIASTOLIC BLOOD PRESSURE: 75 MMHG | HEART RATE: 96 BPM

## 2022-03-04 DIAGNOSIS — M70.62 TROCHANTERIC BURSITIS, LEFT HIP: ICD-10-CM

## 2022-03-04 PROCEDURE — 99214 OFFICE O/P EST MOD 30 MIN: CPT | Mod: 25

## 2022-03-04 PROCEDURE — 20610 DRAIN/INJ JOINT/BURSA W/O US: CPT | Mod: LT

## 2022-03-04 PROCEDURE — 73502 X-RAY EXAM HIP UNI 2-3 VIEWS: CPT | Mod: LT

## 2022-03-08 PROBLEM — M70.62 TROCHANTERIC BURSITIS OF LEFT HIP: Status: ACTIVE | Noted: 2020-03-02

## 2022-03-08 NOTE — ADDENDUM
[FreeTextEntry1] : This note was written by Ailyn Almonte on 03/08/2022 acting as scribe for Erlinda Mena, OTR/L, [place]

## 2022-03-08 NOTE — PHYSICAL EXAM
[de-identified] : Left Hip:\par Hip: Range of Motion in Degrees:\par 	                                 Claimant:	          Normal:	\par Flexion (Active) 	                 120 	          120-degrees	\par Flexion (Passive)	                 120	          120-degrees	\par Extension (Active)	                 -30	          -30-degrees	\par Extension (Passive)	 -30	          -30-degrees	\par Abduction (Active)	                45-50	          66-96-cawwonz	\par Abduction (Passive)	45-50	          41-19-ewgdcvi	\par Adduction (Active)                	20-30	          93-39-xibcrpm	\par Adduction (Passive)	20-30	          46-61-vjdclqe	\par Internal Rotation (Active) 	35	          35-degrees	\par Internal Rotation (Passive)	35	          35-degrees	\par External Rotation (Active)	45	          45-degrees	\par External Rotation (Passive)	45	          45-degrees	\par \par No tenderness with internal or external rotation or axial load.  Point tenderness over the greater trochanter with pain with resisted abduction.  Negative Trendelenburg.  No weakness to flexion, extension, abduction or adduction.  No evidence of instability.  No motor or sensory deficits.  2+ DP and PT pulses.  Skin is intact.  No scars, rashes or lesions.  \par   [de-identified] : Gait and Station:  Ambulating with a slightly antalgic to antalgic gait.  Station:  Normal.  [de-identified] : Appearance:  Well-developed, well-nourished male in no acute distress.\par   [de-identified] : Radiographs, two to three views of the left hip with pelvis, reveal no acute fractures or dislocations.

## 2022-03-08 NOTE — PROCEDURE
[de-identified] : Consent: \par At this time, I have recommended an injection to the left hip.  The risks and benefits of the procedure were discussed with the patient in detail.  Upon verbal consent of the patient, we proceeded with the injection as noted below.  \par \par Procedure:  \par After a sterile prep, the patient underwent an injection of 9 cc of 1% Lidocaine without epinephrine and 1 cc of Kenalog into the left hip.  The patient tolerated the procedure well.  There were no complications.  \par \par

## 2022-03-08 NOTE — REASON FOR VISIT
Nutrition Follow Up Note    Patient seen for: nutrition follow up; Liver Transplant Evaluation    Source: patient, medical record. Information communicated with outpatient Txp RD and Transplant Hepatology Team.    Chart reviewed, events noted. 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    Nutrition Status: Per Txp Team note: "Noted 100lb weight loss over the last 2months likely from fluid overload." Pt reports consistently good po intake, no indication of malnutrition at this time, however RD will continue to monitor. Noted with DM2, HbA1c 4.9%; hyperglycemia being addressed with 20 units Lantus, and Humalog corrective regimen sliding scale. 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    Reviewed therapeutic diet education; RD will continue to reinforce.     Diet : Consistent Carbohydrate, Renal diet with snack     PO intake : Pt observed eating breakfast with good appetite, reports eating >75% of 3 meals daily. Food preferences are being provided, to patient's satisfaction.     Source for PO intake: patient, personal observation     Last BM: ; multiple daily BMs with lactulose Rx    Weight in k.7 (11-30)    IBW 83.6Kg    Drug Dosing Weight  Weight (kg): 115.5 (2019 18:20)  BMI (kg/m2): 33.6 (2019 18:20)    Pertinent Medications: MEDICATIONS  (STANDING):  atorvastatin 10 milliGRAM(s) Oral at bedtime  cefTRIAXone   IVPB 1000 milliGRAM(s) IV Intermittent every 24 hours  dextrose 5%. 1000 milliLiter(s) (50 mL/Hr) IV Continuous <Continuous>  dextrose 50% Injectable 12.5 Gram(s) IV Push once  dextrose 50% Injectable 25 Gram(s) IV Push once  dextrose 50% Injectable 25 Gram(s) IV Push once  insulin glargine Injectable (LANTUS) 20 Unit(s) SubCutaneous at bedtime  insulin lispro (HumaLOG) corrective regimen sliding scale   SubCutaneous three times a day before meals  insulin lispro (HumaLOG) corrective regimen sliding scale   SubCutaneous at bedtime  lactulose Syrup 30 Gram(s) Oral every 4 hours  midodrine. 20 milliGRAM(s) Oral three times a day  pantoprazole    Tablet 40 milliGRAM(s) Oral before breakfast  rifAXIMin 550 milliGRAM(s) Oral two times a day  sucralfate 1 Gram(s) Oral two times a day  tamsulosin 0.4 milliGRAM(s) Oral at bedtime    MEDICATIONS  (PRN):  dextrose 40% Gel 15 Gram(s) Oral once PRN Blood Glucose LESS THAN 70 milliGRAM(s)/deciliter  glucagon  Injectable 1 milliGRAM(s) IntraMuscular once PRN Glucose LESS THAN 70 milligrams/deciliter    LABS:    @ 07:44: Hemoglobin 9.2<L>, Hematocrit 26.3<L>   @ 06:25: Sodium 132<L>, Potassium 3.5, Chloride 92<L>, Calcium 10.9<H>, BUN 35<H>, Creatinine 1.64<H>, <H>, Alk Phos 116, ALT/SGPT 18, AST/SGOT 26, Total Protein 6.6, Albumin 3.9, Total Bilirubin 8.2<H>,     POCT Blood Glucose.: 163 mg/dL (06 Dec 2019 08:25)  POCT Blood Glucose.: 216 mg/dL (05 Dec 2019 22:14)  POCT Blood Glucose.: 259 mg/dL (05 Dec 2019 19:21)  POCT Blood Glucose.: 200 mg/dL (05 Dec 2019 13:31)  POCT Blood Glucose.: 176 mg/dL (05 Dec 2019 12:03)    Skin per nursing documentation: no pressure injuries noted  Edema: none noted    Estimated Needs: based on IBW 83.6Kg, with consideration for catabolic illness and CKD3  2352-1926 fahad/day (25-30cal/Kg)  100-117 Gm protein/day (1.2-1.4Gm/Kg)    Previous Nutrition Diagnosis: 1) Increased Nutrient Needs 2) Food & Nutrition Related Knowledge Deficit  Nutrition Diagnosis is: ongoing, being addressed with therapeutic diet and nutrition education    New Nutrition Diagnosis: none     Interventions:     Recommend  1) Continue Consistent Carbohydrate, Renal diet with snack, in-house and upon discharge.   2) Therapeutic diet education provided; RD will continue to reinforce education prior to discharge.   3) From a Nutrition perspective, pt is appropriate for liver transplant.    Information/Assessment communicated with outpatient liver Txp RD and Txp Hepatology Team.    Monitoring and Evaluation:     Continue to monitor nutritional intake, tolerance to diet prescription, weights, labs, skin integrity.    RD remains available upon request and will follow up per protocol.    Myah Argueta MS RD CDN [facility] (formerly [facility]), Pager # [phone] [Follow-Up Visit] : a follow-up visit for [FreeTextEntry2] : his left hip.

## 2022-03-08 NOTE — DISCUSSION/SUMMARY
[de-identified] : At this time, due to trochanteric bursitis of the left hip, the patient was given a cortisone injection.  The patient is advised to ice the hip.

## 2022-03-10 ENCOUNTER — APPOINTMENT (OUTPATIENT)
Dept: ORTHOPEDIC SURGERY | Facility: CLINIC | Age: 77
End: 2022-03-10
Payer: MEDICARE

## 2022-03-10 VITALS
WEIGHT: 262 LBS | DIASTOLIC BLOOD PRESSURE: 56 MMHG | HEIGHT: 68 IN | SYSTOLIC BLOOD PRESSURE: 122 MMHG | HEART RATE: 85 BPM | BODY MASS INDEX: 39.71 KG/M2

## 2022-03-10 DIAGNOSIS — M75.42 IMPINGEMENT SYNDROME OF LEFT SHOULDER: ICD-10-CM

## 2022-03-10 PROCEDURE — 99214 OFFICE O/P EST MOD 30 MIN: CPT | Mod: 25

## 2022-03-10 PROCEDURE — 73030 X-RAY EXAM OF SHOULDER: CPT | Mod: LT

## 2022-03-10 PROCEDURE — 73560 X-RAY EXAM OF KNEE 1 OR 2: CPT | Mod: LT

## 2022-03-18 ENCOUNTER — APPOINTMENT (OUTPATIENT)
Dept: ORTHOPEDIC SURGERY | Facility: CLINIC | Age: 77
End: 2022-03-18
Payer: MEDICARE

## 2022-03-18 VITALS
BODY MASS INDEX: 39.71 KG/M2 | HEART RATE: 89 BPM | DIASTOLIC BLOOD PRESSURE: 70 MMHG | SYSTOLIC BLOOD PRESSURE: 110 MMHG | WEIGHT: 262 LBS | HEIGHT: 68 IN

## 2022-03-18 DIAGNOSIS — M70.61 TROCHANTERIC BURSITIS, RIGHT HIP: ICD-10-CM

## 2022-03-18 PROCEDURE — 73502 X-RAY EXAM HIP UNI 2-3 VIEWS: CPT | Mod: RT

## 2022-03-18 PROCEDURE — 20610 DRAIN/INJ JOINT/BURSA W/O US: CPT | Mod: RT

## 2022-03-18 PROCEDURE — 99213 OFFICE O/P EST LOW 20 MIN: CPT | Mod: 25

## 2022-03-21 NOTE — PROCEDURE
[de-identified] : Consent: \par At this time, I have recommended an injection to the left shoulder.  The risks and benefits of the procedure were discussed with the patient in detail.  Upon verbal consent of the patient, we proceeded with the injection as noted below.  \par \par Procedure:  \par After a sterile prep, the patient underwent an injection of 9 cc of 1% Lidocaine without epinephrine and 1 cc of Kenalog into the left shoulder.  The patient tolerated the procedure well.  There were no complications.  \par

## 2022-03-21 NOTE — PROCEDURE
[de-identified] : Consent: \par At this time, I have recommended an injection to the right hip.  The risks and benefits of the procedure were discussed with the patient in detail.  Upon verbal consent of the patient, we proceeded with the injection as noted below.  \par \par Procedure:  \par After a sterile prep, the patient underwent an injection of 9 cc of 1% Lidocaine without epinephrine and 1 cc of Kenalog into the right hip.  The patient tolerated the procedure well.  There were no complications.  \par \par

## 2022-03-21 NOTE — DISCUSSION/SUMMARY
[de-identified] : At this time, due to impingement syndrome of the left shoulder, I recommend ice and elevation. He will be reassessed in three to four weeks.

## 2022-03-21 NOTE — HISTORY OF PRESENT ILLNESS
[de-identified] : The patient comes in today with complaints of pain in his left shoulder, especially with overhead activities.

## 2022-03-21 NOTE — PHYSICAL EXAM
[de-identified] : Right Shoulder: \par Range of Motion in Degrees:   	\par    	                        Claimant:          Normal:  	\par Abduction (Active)  	180  	180 degrees  	\par Abduction (Passive)  	180  	180 degrees  	\par Forward elevation (Active):  	180  	180 degrees  	\par Forward elevation (Passive):  180  	180 degrees  	\par External rotation (Active):  	45  	45 degrees  	\par External rotation (Passive):  	45  	45 degrees  	\par Internal rotation (Active):  	L-1  	L-1  	\par Internal rotation (Passive):  	L-1  	L-1  	\par \par No motor weakness to internal rotation, external rotation or abduction in the scapular plane.  Negative crank test.  Negative O’Patrick’s test.  Negative Speed’s test. Negative Yergason’s test.  Negative cross arm test.  No tenderness to palpation at the AC joint. Negative Hawkin’s sign.  Negative Neer’s sign.  Negative apprehension. Negative sulcus sign.  No gross neurological or vascular deficits distally.  Skin is intact.  No rashes, scars or lesions. 2+ radial and ulnar pulses. No extra-articular swelling or tenderness. \par \par Left Shoulder:  \par Range of Motion in Degrees:	\par 	                                  Claimant:	Normal:	\par Abduction (Active)	                  180	               180 degrees	\par Abduction (Passive)	  180	               180 degrees	\par Forward elevation (Active):	  180	               180 degrees	\par Forward elevation (Passive):	  180	               180 degrees	\par External rotation (Active):	   45	               45 degrees	\par External rotation (Passive):	   45	               45 degrees	\par Internal rotation (Active):	   L-1	               L-1	\par Internal rotation (Passive):	   L-1	               L-1	\par  \par No motor weakness to internal rotation, external rotation or abduction in the scapular plane.  Negative crank test.  Negative O’Patrick’s test.  Negative Speed’s test. Negative Yergason’s test.  Negative cross arm test.  No tenderness to palpation at the AC joint. Positive Hawkin’s sign.  Positive Neer’s sign. Negative apprehension. Negative sulcus sign.  No gross neurological or vascular deficits distally.  Skin is intact.  No rashes, scars or lesions. 2+ radial and ulnar pulses. No extra-articular swelling or tenderness.\par   [de-identified] : Ambulating with a normal gait. Station:  Normal.  [de-identified] : Appearance:  Well-developed, well-nourished male in no acute distress.\par   [de-identified] : Radiographs, two views of the left shoulder, show mild to moderate degenerative changes.

## 2022-03-21 NOTE — DISCUSSION/SUMMARY
[de-identified] : At this time, due to trochanteric bursitis of the right hip, the patient was given a cortisone injection.  He is advised to ice the right hip and follow-up as needed.

## 2022-03-21 NOTE — ADDENDUM
[FreeTextEntry1] : This note was written by Ailyn Almonte on 03/21/2022 acting as scribe for Erlinda Mena, OTR/L, [place]

## 2022-03-21 NOTE — PHYSICAL EXAM
[de-identified] : Right Hip:\par Hip: Range of Motion in Degrees:\par 	                                 Claimant:	          Normal:	\par Flexion (Active) 	                 120 	          120-degrees	\par Flexion (Passive)	                 120	          120-degrees	\par Extension (Active)	                 -30	          -30-degrees	\par Extension (Passive)	 -30	          -30-degrees	\par Abduction (Active)	                45-50	          32-25-bnymgtp	\par Abduction (Passive)	45-50	          37-92-jzitmgc	\par Adduction (Active)                	20-30	          72-80-gxmivwz	\par Adduction (Passive)	20-30	          20-66-clrdawt	\par Internal Rotation (Active) 	35	          35-degrees	\par Internal Rotation (Passive)	35	          35-degrees	\par External Rotation (Active)	45	          45-degrees	\par External Rotation (Passive)	45	          45-degrees	\par \par No tenderness with internal or external rotation or axial load.  Point tenderness over the greater trochanter with pain with resisted abduction.  Negative Trendelenburg.  No weakness to flexion, extension, abduction or adduction.  No evidence of instability.  No motor or sensory deficits.  2+ DP and PT pulses.  Skin is intact.  No scars, rashes or lesions.  \par   [de-identified] : Gait and Station:  Ambulating with a slightly antalgic to antalgic gait.  Station:  Normal.  [de-identified] : Appearance:  Well-developed, well-nourished male in no acute distress.\par   [de-identified] : Radiographs, two to three views of the right hip with pelvis, reveal no acute fractures or dislocations.

## 2022-03-21 NOTE — ADDENDUM
[FreeTextEntry1] : This note was written by Rebecca Streeter on 03/21/2022 acting as a scribe for KIM DUBON III, MD

## 2022-05-10 ENCOUNTER — NON-APPOINTMENT (OUTPATIENT)
Age: 77
End: 2022-05-10

## 2022-05-13 ENCOUNTER — INPATIENT (INPATIENT)
Facility: HOSPITAL | Age: 77
LOS: 1 days | Discharge: HOME | DRG: 641 | End: 2022-05-15
Attending: FAMILY MEDICINE | Admitting: FAMILY MEDICINE
Payer: MEDICARE

## 2022-05-13 VITALS
RESPIRATION RATE: 18 BRPM | HEART RATE: 77 BPM | DIASTOLIC BLOOD PRESSURE: 61 MMHG | SYSTOLIC BLOOD PRESSURE: 100 MMHG | TEMPERATURE: 98 F | HEIGHT: 68 IN

## 2022-05-13 DIAGNOSIS — Z98.890 OTHER SPECIFIED POSTPROCEDURAL STATES: Chronic | ICD-10-CM

## 2022-05-13 LAB
ALBUMIN SERPL ELPH-MCNC: 3.6 G/DL — SIGNIFICANT CHANGE UP (ref 3.3–5)
ALP SERPL-CCNC: 78 U/L — SIGNIFICANT CHANGE UP (ref 40–120)
ALT FLD-CCNC: 64 U/L — SIGNIFICANT CHANGE UP (ref 12–78)
ANION GAP SERPL CALC-SCNC: 8 MMOL/L — SIGNIFICANT CHANGE UP (ref 5–17)
APTT BLD: 28.2 SEC — SIGNIFICANT CHANGE UP (ref 27.5–35.5)
AST SERPL-CCNC: 51 U/L — HIGH (ref 15–37)
BASOPHILS # BLD AUTO: 0.03 K/UL — SIGNIFICANT CHANGE UP (ref 0–0.2)
BASOPHILS NFR BLD AUTO: 0.5 % — SIGNIFICANT CHANGE UP (ref 0–2)
BILIRUB SERPL-MCNC: 0.4 MG/DL — SIGNIFICANT CHANGE UP (ref 0.2–1.2)
BUN SERPL-MCNC: 39 MG/DL — HIGH (ref 7–23)
CALCIUM SERPL-MCNC: 9.1 MG/DL — SIGNIFICANT CHANGE UP (ref 8.5–10.1)
CHLORIDE SERPL-SCNC: 110 MMOL/L — HIGH (ref 96–108)
CO2 SERPL-SCNC: 24 MMOL/L — SIGNIFICANT CHANGE UP (ref 22–31)
CREAT SERPL-MCNC: 2.51 MG/DL — HIGH (ref 0.5–1.3)
EGFR: 26 ML/MIN/1.73M2 — LOW
EOSINOPHIL # BLD AUTO: 0.03 K/UL — SIGNIFICANT CHANGE UP (ref 0–0.5)
EOSINOPHIL NFR BLD AUTO: 0.5 % — SIGNIFICANT CHANGE UP (ref 0–6)
GLUCOSE SERPL-MCNC: 135 MG/DL — HIGH (ref 70–99)
HCT VFR BLD CALC: 36.8 % — LOW (ref 39–50)
HGB BLD-MCNC: 11.6 G/DL — LOW (ref 13–17)
IMM GRANULOCYTES NFR BLD AUTO: 0.6 % — SIGNIFICANT CHANGE UP (ref 0–1.5)
INR BLD: 1.09 RATIO — SIGNIFICANT CHANGE UP (ref 0.88–1.16)
LYMPHOCYTES # BLD AUTO: 0.76 K/UL — LOW (ref 1–3.3)
LYMPHOCYTES # BLD AUTO: 11.9 % — LOW (ref 13–44)
MAGNESIUM SERPL-MCNC: 2.5 MG/DL — SIGNIFICANT CHANGE UP (ref 1.6–2.6)
MCHC RBC-ENTMCNC: 30.1 PG — SIGNIFICANT CHANGE UP (ref 27–34)
MCHC RBC-ENTMCNC: 31.5 GM/DL — LOW (ref 32–36)
MCV RBC AUTO: 95.6 FL — SIGNIFICANT CHANGE UP (ref 80–100)
MONOCYTES # BLD AUTO: 0.76 K/UL — SIGNIFICANT CHANGE UP (ref 0–0.9)
MONOCYTES NFR BLD AUTO: 11.9 % — SIGNIFICANT CHANGE UP (ref 2–14)
NEUTROPHILS # BLD AUTO: 4.74 K/UL — SIGNIFICANT CHANGE UP (ref 1.8–7.4)
NEUTROPHILS NFR BLD AUTO: 74.6 % — SIGNIFICANT CHANGE UP (ref 43–77)
PLATELET # BLD AUTO: 152 K/UL — SIGNIFICANT CHANGE UP (ref 150–400)
POTASSIUM SERPL-MCNC: 4 MMOL/L — SIGNIFICANT CHANGE UP (ref 3.5–5.3)
POTASSIUM SERPL-SCNC: 4 MMOL/L — SIGNIFICANT CHANGE UP (ref 3.5–5.3)
PROT SERPL-MCNC: 7.4 GM/DL — SIGNIFICANT CHANGE UP (ref 6–8.3)
PROTHROM AB SERPL-ACNC: 12.6 SEC — SIGNIFICANT CHANGE UP (ref 10.5–13.4)
RBC # BLD: 3.85 M/UL — LOW (ref 4.2–5.8)
RBC # FLD: 14.8 % — HIGH (ref 10.3–14.5)
SODIUM SERPL-SCNC: 142 MMOL/L — SIGNIFICANT CHANGE UP (ref 135–145)
TROPONIN I, HIGH SENSITIVITY RESULT: 18.65 NG/L — SIGNIFICANT CHANGE UP
TROPONIN I, HIGH SENSITIVITY RESULT: 18.96 NG/L — SIGNIFICANT CHANGE UP
TROPONIN I, HIGH SENSITIVITY RESULT: 22.38 NG/L — SIGNIFICANT CHANGE UP
WBC # BLD: 6.36 K/UL — SIGNIFICANT CHANGE UP (ref 3.8–10.5)
WBC # FLD AUTO: 6.36 K/UL — SIGNIFICANT CHANGE UP (ref 3.8–10.5)

## 2022-05-13 PROCEDURE — 81001 URINALYSIS AUTO W/SCOPE: CPT

## 2022-05-13 PROCEDURE — 84300 ASSAY OF URINE SODIUM: CPT

## 2022-05-13 PROCEDURE — 84133 ASSAY OF URINE POTASSIUM: CPT

## 2022-05-13 PROCEDURE — 94640 AIRWAY INHALATION TREATMENT: CPT

## 2022-05-13 PROCEDURE — 80048 BASIC METABOLIC PNL TOTAL CA: CPT

## 2022-05-13 PROCEDURE — 84540 ASSAY OF URINE/UREA-N: CPT

## 2022-05-13 PROCEDURE — 80053 COMPREHEN METABOLIC PANEL: CPT

## 2022-05-13 PROCEDURE — 85025 COMPLETE CBC W/AUTO DIFF WBC: CPT

## 2022-05-13 PROCEDURE — 84156 ASSAY OF PROTEIN URINE: CPT

## 2022-05-13 PROCEDURE — 82570 ASSAY OF URINE CREATININE: CPT

## 2022-05-13 PROCEDURE — 83935 ASSAY OF URINE OSMOLALITY: CPT

## 2022-05-13 PROCEDURE — 85027 COMPLETE CBC AUTOMATED: CPT

## 2022-05-13 PROCEDURE — 93005 ELECTROCARDIOGRAM TRACING: CPT

## 2022-05-13 PROCEDURE — 84443 ASSAY THYROID STIM HORMONE: CPT

## 2022-05-13 PROCEDURE — 76770 US EXAM ABDO BACK WALL COMP: CPT

## 2022-05-13 RX ORDER — SODIUM CHLORIDE 9 MG/ML
3 INJECTION INTRAMUSCULAR; INTRAVENOUS; SUBCUTANEOUS ONCE
Refills: 0 | Status: COMPLETED | OUTPATIENT
Start: 2022-05-13 | End: 2022-05-13

## 2022-05-13 RX ADMIN — SODIUM CHLORIDE 3 MILLILITER(S): 9 INJECTION INTRAMUSCULAR; INTRAVENOUS; SUBCUTANEOUS at 16:50

## 2022-05-13 NOTE — ED PROVIDER NOTE - NS ED ROS FT
Gen: No fever, normal appetite, +generalized weakness and fatigue  Eyes: No eye irritation or discharge  ENT: No ear pain, congestion, sore throat  Resp: No cough or trouble breathing  Cardiovascular: No chest pain or palpitation  Gastroenteric: No nausea/vomiting, diarrhea, constipation  :  No change in urine output; no dysuria  MS: No joint or muscle pain, - pain when falling over in sitting position, - myalgias,  Skin: No rashes  Neuro: No headache; no abnormal movements  Remainder negative, except as per the HPI

## 2022-05-13 NOTE — ED PROVIDER NOTE - OBJECTIVE STATEMENT
77 y/o male with a PMHx of bladder CA current in remission x10 years, morbid obesity, HTN, anxiety, depression, BPH, neuropathy of upper LLE, renal calculi, and HLD presents to the ED s/p full syncopal episode. Reports cough and HA. States he had 3 outpatient negative rapid covid tests. pt reports minimally productive cough x2 days with mild sinus congestion. Pt with increased general weakness and fatigue during mild exertion. acute lightheaded pt sat down against wall and syncopized. Syncopal episode witnessed by family with no post ectal period and no seizure. Pt reached full alertness in less than a minute. Denies pain from falling over from sitting position, myalgias, and SOB.

## 2022-05-13 NOTE — ED ADULT TRIAGE NOTE - CHIEF COMPLAINT QUOTE
Pt presented to the ER with a near syncopal episode that occurred today. Pt stated that he was outside when he became dizzy. Pt is unsure he had any LOC. Pt stated that he c/o headache and nausea.

## 2022-05-13 NOTE — ED PROVIDER NOTE - NSICDXPASTSURGICALHX_GEN_ALL_CORE_FT
PAST SURGICAL HISTORY:  H/O cystoscopy TURB, BCG instillation x2 --2013, Bladder cancer    H/O knee surgery arthroscopy does not recall which knee 15 yrs ago    H/O shoulder surgery right shoulder tendon repair-2014    History of nephrolithotomy with removal of calculi Staghorn calculus left kidney--2010    Previous back surgery thoracic/lumbar x2---last 2011 fusion with instrumentation

## 2022-05-13 NOTE — PATIENT PROFILE ADULT - FLU SEASON?
OFFICE VISIT      Patient: Marilee Greenwood Date of Service: 2019   : 1942 MRN: [id number]     SUBJECTIVE:     Chief Complaint   Patient presents with   • Follow-up     seen on 2019 for influenza   • Cramps     both ankle between 4-6am, terrible pain.        HISTORY OF PRESENT ILLNESS:  Mairlee Greenwood is a 77 year old female who presents today for follow-up and cramps. Is accompanied.    Type 2 diabetes mellitus: Has type 2 diabetes. Is on Insulin at 26 units at night. Her glucose that morning was recorded to be 286 mg/dL. Her HbA1c was 8.4%. Last HbA1c was 7.6%. The levels prior to that was 9.5% when she was started on insulin. Feels that her glucose has been elevated recently. Takes Metformin and Glipizide along with the insulin. Takes Gabapentin for neuropathy. Takes Novolin NPH 25 units. Does not believe her diet has changed. Has not been going out much due to the cold weather. Her glucose levels last year was around 173, 174 mg/dL. Fasting glucose levels were 104 mg/dL. Eats twice a day. Does not feel the Insulin is a problem to administer. Inquires if the short acting Insulin has to be taken with every meal. Mentions that dinner is her largest meal. Eats cereals or eggs in breakfast. Does not have lunch properly. Takes soups occasionally. Admits she is not a lunch person. Has her dinner between 7:30 and 8:00 p.m. Goes to bed by 9:30 or 10:00 p.m.    Essential hypertension: Takes Hydrochlorothiazide and Lisinopril.     Hypothyroidism: Takes Synthroid.     Muscle cramps at night: Has muscle cramps near her ankles and wakes her up at 2:00-4:00 in the morning. The cramps extend to her foot and she is not able to move. Has been eating more bananas and potassium rich food which benefits her. Uses Arnica gel before she goes to bed. Wonders if she has to go to the podiatrist. Cramps usually happen at night. Starts with one leg and then suddenly both legs cramp. Had consulted a podiatrist several  years ago for surgery. Also has numbness and stiffness in toes.    Malignant neoplasm of female breast:   Last mammogram was done in August 2018.     Viral URI:  Complains of cough with phlegm. The cough is better than before as she is not waking up at night choking. Takes cough drops. Inquires if she has bronchitis.   Was diagnosed with flu on April 4, 2019 by Dr. Angelo Sanders. Was prescribed Tamiflu. She was vomiting which got better on Sunday. She stopped vomiting and started eating better and the fever subsided. Does not take Zofran anymore.    Additional Comments:   Cholesterol levels are 132 mg/dL, LDL is good at 63 mg/dL, HDL is good at 44 mg/dL. Triglycerides are good at 124 mg/dL.    PAST MEDICAL HISTORY:  Past Medical History:   Diagnosis Date   • Breast CA (CMS/HCC)    • Diabetes mellitus (CMS/HCC)    • Essential (primary) hypertension    • OA (osteoarthritis)        MEDICATIONS:  Current Outpatient Medications   Medication Sig   • insulin regular, human, (NOVOLIN R) 100 UNIT/ML injectable solution 6-8 units prior to meals (breakfast/dinner)   • gabapentin (NEURONTIN) 400 MG capsule TAKE 1 CAPSULE 3 TIMES DAILY   • glipiZIDE (GLUCOTROL) 10 MG tablet TAKE TWO TABLETS BY MOUTH TWICE DAILY   • hydrochlorothiazide (HYDRODIURIL) 25 MG tablet TAKE 1 TABLET BY MOUTH ONCE DAILY AS DIRECTED   • Insulin Syringe 31G X 5/16\" 0.5 ML Misc USE TO INJECT INSULIN DAILY AS DIRECTED   • levothyroxine (SYNTHROID, LEVOTHROID) 112 MCG tablet TAKE ONE TABLET BY MOUTH ONCE DAILY   • insulin NPH (NOVOLIN N) 100 UNIT/ML injectable suspension 25 Units daily.   • lisinopril (ZESTRIL) 5 MG tablet TAKE ONE TABLET BY MOUTH ONCE DAILY   • metformin (GLUCOPHAGE) 1000 MG tablet TAKE ONE TABLET BY MOUTH TWICE DAILY     No current facility-administered medications for this visit.        ALLERGIES:  ALLERGIES:  No Known Allergies    PAST SURGICAL HISTORY:  Past Surgical History:   Procedure Laterality Date   • Appendectomy     • Breast surgery      • Cholecystectomy     • Hysterectomy     • Total knee replacement Bilateral    • Tubal ligation         FAMILY HISTORY:  No family history on file.    SOCIAL HISTORY:  Social History     Tobacco Use   Smoking Status Former Smoker   • Types: Cigarettes   Smokeless Tobacco Never Used     Social History     Substance and Sexual Activity   Alcohol Use Yes    Comment: social        Review of Systems    Const: per HPI  HEENT: per HPI  CVS: per HPI  Endo: per HPI  Musc: per HPI  Neuro: per HPI  Breasts: per HPI      OBJECTIVE:     Visit Vitals  /54   Pulse 72   Temp 98.3 °F (36.8 °C)   Resp 16   Ht 5' 3\" (1.6 m)   Wt 78.9 kg (174 lb)   BMI 30.82 kg/m²       Physical Exam      Constitutional: alert, in no acute distress and current vital signs reviewed.  Head and Face: atraumatic, no deformities, normocephalic, normal facies.  Eyes: no discharge, normal conjunctiva, no eyelid swelling, no ptosis and the sclerae were normal. pupils equal, round and reactive to light and accommodation, conjugate gaze and extraocular movements were intact.  ENT: normal appearing outer ear, normal appearing nose. examination of the tympanic membrane showed normal landmarks, normal appearing external canal. nasal mucosa moist and pink, no nasal discharge. normal lips. oral mucosa pink and moist, no oral lesions.  Neck: normal appearing neck, supple neck and no mass was seen. thyroid not enlarged and no thyroid nodules.  Lymphatic: no lymphadenopathy.  Pulmonary: no respiratory distress, normal respiratory rate and effort and no accessory muscle use. breath sounds clear to auscultation bilaterally.  Cardiovascular: normal rate, no murmurs were heard, regular rhythm, normal S1 and normal S2. edema was not present in the lower extremities. 2+ posterior tibialis pulses bilaterally.  Abdomen: soft, nontender, nondistended, normal bowel sounds and no abdominal mass. no hepatomegaly and no splenomegaly. no umbilical hernia was  discovered.  Musculoskeletal: Decreased sensation to light touch. Normal gait. Normal range of motion. There was no joint instability noted. Muscle strength and tone were normal.  Neurologic: cranial nerves grossly intact. No sensory deficits noted. No coordination deficits.   Psychiatric: oriented to person, oriented to place and oriented to time. alert and awake, interactive and mood/affect were appropriate. judgement not impaired. normal attention span. short term memory intact.  Skin, Hair, Nails: normal skin color and pigmentation and no rash. no foot ulcers and no skin ulcer was seen. normal skin turgor. no clubbing or cyanosis of the fingernails.       DIAGNOSTIC STUDIES:   LAB RESULTS:    Lab Services on 04/08/2019   Component Date Value Ref Range Status   • Hemoglobin A1C 04/08/2019 8.4* 4.5 - 5.6 % Final   • FASTING STATUS 04/08/2019 10  hrs Final   • CHOLESTEROL 04/08/2019 132  <200 mg/dL Final   • CALCULATED LDL 04/08/2019 63  <130 mg/dL Final   • HDL 04/08/2019 44* >49 mg/dL Final   • TRIGLYCERIDE 04/08/2019 124  <150 mg/dL Final   • CALCULATED NON HDL 04/08/2019 88  mg/dL Final   • CHOL/HDL 04/08/2019 3.0  <4.5 Final   • Fasting Status 04/08/2019 10  hrs Final   • Sodium 04/08/2019 139  135 - 145 mmol/L Final   • Potassium 04/08/2019 4.1  3.4 - 5.1 mmol/L Final   • Chloride 04/08/2019 102  98 - 107 mmol/L Final   • Carbon Dioxide 04/08/2019 28  21 - 32 mmol/L Final   • Anion Gap 04/08/2019 13  10 - 20 mmol/L Final   • Glucose 04/08/2019 107* 65 - 99 mg/dL Final   • BUN 04/08/2019 21* 6 - 20 mg/dL Final   • Creatinine 04/08/2019 0.94  0.51 - 0.95 mg/dL Final   • GFR Estimate,  04/08/2019 68   Final   • GFR Estimate, Non  04/08/2019 59   Final   • BUN/Creatinine Ratio 04/08/2019 22  7 - 25 Final   • CALCIUM 04/08/2019 9.3  8.4 - 10.2 mg/dL Final   • TOTAL BILIRUBIN 04/08/2019 0.5  0.2 - 1.0 mg/dL Final   • AST/SGOT 04/08/2019 41* <38 Units/L Final   • ALT/SGPT 04/08/2019  41  <79 Units/L Final   • ALK PHOSPHATASE 04/08/2019 132* 45 - 117 Units/L Final   • TOTAL PROTEIN 04/08/2019 7.5  6.4 - 8.2 g/dL Final   • Albumin 04/08/2019 3.6  3.6 - 5.1 g/dL Final   • GLOBULIN 04/08/2019 3.9  2.0 - 4.0 g/dL Final   • A/G Ratio, Serum 04/08/2019 0.9* 1.0 - 2.4 Final       [unfilled]      Assessment AND PLAN:   This is a 77 year old year-old female who presents with     1. Type 2 diabetes mellitus with diabetic polyneuropathy, with long-term current use of insulin (CMS/[facility])    2. Hypothyroidism, unspecified type    3. Malignant neoplasm of female breast, unspecified estrogen receptor status, unspecified laterality, unspecified site of breast (CMS/HCC)    4. Muscle cramps at night    5. Essential hypertension    6. Viral URI      Type 2 diabetes mellitus with diabetic polyneuropathy, with long-term current use of insulin (CMS/[facility]):  Previous and recent reports reviewed and discussed.  Hba1c levels are elevated.   Prescribed Novolin R 6-8 units. To be taken before dinner or any big meals.   Advised to check levels at bed time and in the morning for a month. The glucose levels are expected to be below 180 mg/dL.  Educated on short acting and long acting insulin. Discussed Victoza and it being an expensive medication.  Explained that HbA1C levels are elevated as her glucose levels spikes up after she eats.   Follow up in a month to check levels and further management.    Hypothyroidism, unspecified type:  Ordered thyroid tests. Refer to orders.  Continue current medication.      Malignant neoplasm of female breast, unspecified estrogen receptor status, unspecified laterality, unspecified site of breast (CMS/HCC)   Continue to monitor.  Up-to-date with mammogram.    Muscle cramps at night:  Ordered magnesium level tests.   Refer to podiatrist.    Essential hypertension:  Blood pressure well controlled with medication.  Continue current medication.      Viral URI:  Residual cough from the flu which  is getting better.  Expected course of symptom resolution explained.    Discussed considering antibiotics if symptoms persist or worsen.    Previous reports reviewed and discussed.  Ordered lab works. Refer to orders.     Proper usage and side effects of medications reviewed & discussed.   Refer to orders.   Return to clinic as clinically indicated as discussed with patient who verbalized understanding of & agreement with the plan.     No follow-ups on file.    Instructions provided as documented in the AVS.  Medication use,effects and side effects discussed in detail with patient.  The patient indicated understanding of the diagnosis and agreed with the plan of care.    Devi Ayala    Scribe Attestation: Entered by Dr. Lucy Quinonez, acting as scribe for Dr. Tavon Sanders.      No

## 2022-05-13 NOTE — H&P ADULT - NSHPPHYSICALEXAM_GEN_ALL_CORE
Vital Signs Last 24 Hrs  T(C): 36.8 (13 May 2022 20:53), Max: 36.8 (13 May 2022 13:21)  T(F): 98.3 (13 May 2022 20:53), Max: 98.3 (13 May 2022 13:21)  HR: 87 (13 May 2022 20:53) (77 - 87)  BP: 139/70 (13 May 2022 20:53) (100/61 - 155/76)  BP(mean): 89 (13 May 2022 20:53) (89 - 97)  RR: 19 (13 May 2022 20:53) (18 - 19)  SpO2: 99% (13 May 2022 20:53) (95% - 99%)

## 2022-05-13 NOTE — ED ADULT TRIAGE NOTE - RESPIRATORY RATE (BREATHS/MIN)
Consultation - Pulmonary Medicine   Floyd Rohit 67 y o  male MRN: [id number]  Unit/Bed#: [facility] 509-01 Encounter: [id number]      Assessment / Plan    Acute on Chronic Hypoxic and Hypercapnic Respiratory failure in the setting of Severe COPD Exacerbation     - Initially placed on bipap, Now on baseline Oxygen at 4 L,saturating 96%    - Nocturnal Pulse Ox demonstrated desaturation at 63%  w/ 2 desaturation events lasting 2 min and 26 secs  - Weaning off Steroids, Prednisone 40 mg QD, taper off 10 mg every 3 days until completed the course  - Continue Nebulized bronchodilators - Pulmicort, Ipratropium and Xopenex  - Continue Incentive Spirometry use  - Continue activity as able  - Outpatient Follow up w/ Dr Candace Pguh  PAF  Rate controlled  Continue diltiazem 180 mg QD    Leukopenia:     - has leukopenia @ baseline, worsening  - WBC < 4 @ 2 69, HR: 96, RR: 34, meets SIRS  - CXR w/o acute abnormality; no source of infection  Improving today  Continue monitor  If worsens will consult Heme-Onc    HTN    GERD    on Protonix        History of Present Illness   Physician Requesting Consult: Whit An MD  Reason for Consult / Principal Problem: COPD Exacerbation  Hx and PE limited by:     Carolee Marcelino is a 67y o  year old male who presents with PMHx of Severe COPD, PAF, MGUS, Anxiety, Anemia who is on Hospital Day 6 of Acute on Chronic Hypercapnic Respiratory Failure in the setting of COPD exacerbation    Consults    Review of Systems   Constitutional: Negative for chills, diaphoresis, fatigue and fever  HENT: Negative  Respiratory: Positive for cough (Sever COPD, improving)  Negative for apnea, choking, chest tightness, shortness of breath (at baseline), wheezing and stridor  Cardiovascular: Negative for chest pain, palpitations and leg swelling  Gastrointestinal: Negative for nausea and vomiting  Endocrine: Negative      Genitourinary: Negative for decreased urine volume, hematuria and urgency  Psychiatric/Behavioral: Negative for agitation, behavioral problems and confusion         Historical Information   Past Medical History:   Diagnosis Date    Anxiety     Aortic regurgitation     Atrial flutter (HCC)     Chronic respiratory failure (HCC)     Colon polyp     COPD (chronic obstructive pulmonary disease) (HCC)     Deep venous thrombosis of distal lower extremity (HCC)     Diverticulitis     GI bleed     Hypertension     Iron deficiency anemia     MGUS (monoclonal gammopathy of unknown significance)     Osteoarthritis of hip     PAC (premature atrial contraction)     Paroxysmal atrial fibrillation (HCC)     Patent foramen ovale     Pulmonary artery hypertension (HCC)     Renal failure     Sinus tachycardia      Past Surgical History:   Procedure Laterality Date    APPENDECTOMY      COLON SURGERY      HERNIA REPAIR      JOINT REPLACEMENT      KNEE SURGERY       Social History   Social History     Substance and Sexual Activity   Alcohol Use Not Currently     Social History     Substance and Sexual Activity   Drug Use Not Currently     E-Cigarette/Vaping     E-Cigarette/Vaping Substances     Social History     Tobacco Use   Smoking Status Former Smoker    Packs/day: 1 50    Years: 42 00    Pack years: 63 00    Types: Cigarettes    Start date: 5    Last attempt to quit:     Years since quittin 5   Smokeless Tobacco Never Used     Occupational History:     Family History:   Family History   Problem Relation Age of Onset    Cancer Mother     Heart attack Father     Sudden death Father     Arthritis Family     Diabetes Family        Meds/Allergies   all current active meds have been reviewed, pertinent pulmonary meds have been reviewed, current meds:   Current Facility-Administered Medications   Medication Dose Route Frequency    budesonide (PULMICORT) inhalation solution 0 5 mg  0 5 mg Nebulization Q12H    citalopram (CeleXA) tablet 20 mg  20 mg Oral Daily    diltiazem (CARDIZEM CD) 24 hr capsule 180 mg  180 mg Oral Daily    enoxaparin (LOVENOX) subcutaneous injection 40 mg  40 mg Subcutaneous Daily    ferrous sulfate tablet 325 mg  325 mg Oral Daily    folic acid (FOLVITE) tablet 1,000 mcg  1,000 mcg Oral Daily    furosemide (LASIX) tablet 10 mg  10 mg Oral Daily    ipratropium (ATROVENT) 0 02 % inhalation solution 0 5 mg  0 5 mg Nebulization Q6H    levalbuterol (XOPENEX) inhalation solution 1 25 mg  1 25 mg Nebulization Q6H    melatonin tablet 6 mg  6 mg Oral HS PRN    methylPREDNISolone sodium succinate (Solu-MEDROL) injection 40 mg  40 mg Intravenous Daily    pantoprazole (PROTONIX) EC tablet 40 mg  40 mg Oral Early Morning    potassium chloride (K-DUR,KLOR-CON) CR tablet 20 mEq  20 mEq Oral Daily    sodium chloride (PF) 0 9 % injection 3 mL  3 mL Intravenous Q1H PRN    and PTA meds:   Prior to Admission Medications   Prescriptions Last Dose Informant Patient Reported? Taking?    albuterol (2 5 mg/3 mL) 0 083 % nebulizer solution 7/1/2020 at Unknown time  No Yes   Sig: INHALE THE CONTENTS OF 1 VIAL VIA NEBULIZER EVERY 6 HOURS AS NEEDED FOR WHEEZING OR SHORTNESS OF BREATH   albuterol (ProAir HFA) 90 mcg/act inhaler 7/1/2020 at Unknown time  No Yes   Sig: Inhale 2 puffs every 6 (six) hours as needed for wheezing   budesonide (PULMICORT) 0 5 mg/2 mL nebulizer solution 7/1/2020 at Unknown time  No Yes   Sig: TAKE 1 VIAL (0 5 MG TOTAL) BY NEBULIZATION 2 (TWO) TIMES A DAY RINSE MOUTH AFTER USE    citalopram (CeleXA) 20 mg tablet 6/30/2020 at Unknown time Self No Yes   Sig: Take 1 tablet (20 mg total) by mouth daily   diltiazem (CARDIZEM CD) 180 mg 24 hr capsule 6/30/2020 at Unknown time  No Yes   Sig: TAKE 1 CAPSULE BY MOUTH EVERY DAY   ferrous sulfate 325 (65 Fe) mg tablet 6/30/2020 at Unknown time Self Yes Yes   Sig: Take 325 mg by mouth daily   folic acid (FOLVITE) 1 mg tablet 6/30/2020 at Unknown time  No Yes   Sig: TAKE 1 TABLET BY MOUTH EVERY DAY furosemide (LASIX) 20 mg tablet 6/30/2020 at Unknown time Self No Yes   Sig: TAKE 1/2 TABLET BY MOUTH DAILY   ipratropium (ATROVENT) 0 02 % nebulizer solution 6/30/2020 at Unknown time  No Yes   Sig: TAKE 1 VIAL (0 5 MG TOTAL) BY NEBULIZATION 4 (FOUR) TIMES A DAY   melatonin 3 mg 6/30/2020 at Unknown time Self No Yes   Sig: Take 2 tablets by mouth daily at bedtime as needed (sleep)   methylPREDNISolone 4 MG tablet therapy pack Not Taking at Unknown time  No No   Sig: Use as directed on package   Patient not taking: Reported on 7/1/2020   potassium chloride (K-DUR,KLOR-CON) 20 mEq tablet 6/30/2020 at Unknown time  No Yes   Sig: TAKE 1 TABLET BY MOUTH EVERY DAY      Facility-Administered Medications: None       Allergies   Allergen Reactions    Penicillins Anaphylaxis       Objective   Vitals: Blood pressure 139/65, pulse 97, temperature 97 7 °F (36 5 °C), temperature source Oral, resp  rate 20, height 5' 7" (1 702 m), weight 56 7 kg (125 lb), SpO2 98 %  ,Body mass index is 19 58 kg/m²  Intake/Output Summary (Last 24 hours) at 7/6/2020 7614  Last data filed at 7/6/2020 0265  Gross per 24 hour   Intake 350 ml   Output 650 ml   Net -300 ml     Invasive Devices     Peripheral Intravenous Line            Peripheral IV 07/05/20 Proximal;Right;Ventral (anterior) Forearm less than 1 day                Physical Exam   Constitutional: He is oriented to person, place, and time  No distress  Malnourished   HENT:   Head: Normocephalic and atraumatic  Cardiovascular: Normal rate, regular rhythm and normal heart sounds  Exam reveals no gallop and no friction rub  No murmur heard  Pulmonary/Chest: Effort normal  No respiratory distress  He has no wheezes  He has no rales  He exhibits no tenderness  Decreased Breath sounds B/L   Abdominal: Soft  Musculoskeletal: He exhibits no edema  Neurological: He is alert and oriented to person, place, and time  Skin: Capillary refill takes less than 2 seconds   He is not diaphoretic  Lab Results: ABG: No results found for: PHART, ICZ9VWN, PO2ART, NVX6VSZ, H8PRBIQN, BEART, SOURCE, BNP: No results found for: BNP, CBC: No results found for: WBC, HGB, HCT, MCV, PLT, ADJUSTEDWBC, MCH, MCHC, RDW, MPV, NRBC, CMP: No results found for: SODIUM, K, CL, CO2, ANIONGAP, BUN, CREATININE, GLUCOSE, CALCIUM, AST, ALT, ALKPHOS, PROT, BILITOT, EGFR, PT/INR: No results found for: PT, INR, Troponin: No results found for: TROPONINI  Imaging Studies: I have personally reviewed pertinent reports   , I have personally reviewed pertinent films in PACS and I have personally reviewed pertinent films in PACS with a Radiologist   EKG, Pathology, and Other Studies: I have personally reviewed pertinent reports  VTE Prophylaxis: Enoxaparin (Lovenox)    Code Status: Level 2 - DNAR: but accepts endotracheal intubation  Advance Directive and Living Will: Yes    Power of :    POLST:      Counseling/Coordination of Care: Total floor / unit time spent today 30 minutes  Greater than 50% of total time was spent with the patient and / or family counseling and / or coordination of care  A description of the counseling / coordination of care:  In patient 18

## 2022-05-13 NOTE — ED ADULT NURSE NOTE - OBJECTIVE STATEMENT
pt 77 yo male bibems for syncopal episode, pt states he was walking out and suddenly felt dizziness, sat down on a bench and passed out and fell backwards onto grass.  pt aaox4, pt c/o ha, which started 2 days ago, 5/10, pt states he's been having for cough x 2 days before the ha started.

## 2022-05-13 NOTE — ED PROVIDER NOTE - PHYSICAL EXAMINATION
Constitutional: NAD AAOx3 obese elderly white male alert, no sentence shortening, mildly ill appearing  Eyes: PERRLA EOMI, raccoons negative  Head: Normocephalic atraumatic  Mouth: MM mildly dry voice mildly hoarse  ENT: oropharynx clear  Cardiac: regular rate   Resp: Lungs CTAB, no respiratory distress  GI: Abd s/nt/nd  Neuro: CN2-12 intact, non-focal  msk: manuel x4  Skin: No visible rashes, no tactile warmth

## 2022-05-13 NOTE — H&P ADULT - HISTORY OF PRESENT ILLNESS
75 y/o M PMHx significant for bladder CA current in remission x10 years, chronic back pain, morbid obesity, Hypertension, Hyperlipidemia, anxiety, depression, BPH, and neuropathy was BIBA from home s/p syncopal episode. Of note the patient was recently diagnosed with a upper respiratory infection and had associated generalized weakness, fatigue, productive cough, sinus congestion, and headaches. The patient has had 3 outpatient negative rapid COVID tests. Today the patient states he became acutely lightheaded prompting him to sit down against the wall, then syncopized. The patient's syncopal episode was witnessed by the patient's family. The episode lasted less than one minute. The patient denies any associated prodrome of chest pain, shortness of breath.   Labs => TnI (-) x 3, Cl 110, BUN/Cr 39/2.51. CT Head => No acute intracranial hemorrhage or mass effect. CXR => Clear lungs with no acute cardiopulmonary abnormalities. 2DECHO from 5/2021 =>  Estimated left ventricular ejection fraction is 55-60 %. The left ventricle is normal in size, wall thickness, wall motion and contractility as seen in limited views. The left atrium is moderately dilated. Mild to Moderate mitral regurgitation.

## 2022-05-13 NOTE — ED ADULT NURSE NOTE - NSIMPLEMENTINTERV_GEN_ALL_ED
Implemented All Fall with Harm Risk Interventions:  [place] to call system. Call bell, personal items and telephone within reach. Instruct patient to call for assistance. Room bathroom lighting operational. Non-slip footwear when patient is off stretcher. Physically safe environment: no spills, clutter or unnecessary equipment. Stretcher in lowest position, wheels locked, appropriate side rails in place. Provide visual cue, wrist band, yellow gown, etc. Monitor gait and stability. Monitor for mental status changes and reorient to person, place, and time. Review medications for side effects contributing to fall risk. Reinforce activity limits and safety measures with patient and family. Provide visual clues: red socks.

## 2022-05-13 NOTE — H&P ADULT - NSICDXPASTMEDICALHX_GEN_ALL_CORE_FT
PAST MEDICAL HISTORY:  Anxiety and depression     Arthritis     Bladder cancer Dx 2013    BPH (benign prostatic hyperplasia)     Bursitis of both hips     COVID-19 vaccine series completed Completed 2/2021--Booster 9/2/2021    HLD (hyperlipidemia)     HTN (hypertension)     Kidney stones     Morbid obesity     Neuropathy left upper leg    Torn meniscus left    Torn tendon right biceps     PAST MEDICAL HISTORY:  Anxiety and depression     Arthritis     Bladder cancer Dx 2013    BPH (benign prostatic hyperplasia)     Bursitis of both hips     COVID-19 vaccine series completed Completed 2/2021--Booster 9/2/2021    HLD (hyperlipidemia)     HTN (hypertension)     Kidney stones     Mild mitral regurgitation     Morbid obesity     Neuropathy left upper leg    Torn meniscus left    Torn tendon right biceps

## 2022-05-13 NOTE — H&P ADULT - ASSESSMENT
77 y/o M PMHx significant for bladder CA current in remission x10 years, chronic back pain, morbid obesity, Hypertension, Hyperlipidemia, anxiety, depression, BPH, and neuropathy was BIBA from home s/p syncopal episode. Of note the patient was recently diagnosed with a upper respiratory infection and had associated generalized weakness, fatigue, productive cough, sinus congestion, and headaches. The patient has had 3 outpatient negative rapid COVID tests. Today the patient states he became acutely lightheaded prompting him to sit down against the wall, then syncopized. The patient's syncopal episode was witnessed by the patient's family. The episode lasted less than one minute. The patient denies any associated prodrome of chest pain, shortness of breath.   Labs => TnI (-) x 3, Cl 110, BUN/Cr 39/2.51. CT Head => No acute intracranial hemorrhage or mass effect. CXR => Clear lungs with no acute cardiopulmonary abnormalities. 2DECHO from 5/2021 =>  Estimated left ventricular ejection fraction is 55-60 %. The left ventricle is normal in size, wall thickness, wall motion and contractility as seen in limited views. The left atrium is moderately dilated. Mild to Moderate mitral regurgitation.    #Syncope  ~cont. to observe on Telemetry  ~serial Marty/EKGs  ~f/u w/ Cardiology in the am  ~f/u 2DECHO  ~fall precautions  ~bed alarm    #ALBERT on CKD3  ~cont. trial IV hydration  ~f/u urine studies  ~f/u w/ Nephrology in the am  ~will hold Losartan 50mg po daily    #Hypertension  ~will hold Chlorthalidone (takes 25mg po daily)  ~cont. Amlodipine 10mg po daily  ~cont. Metoprolol ER 50mg po daily    #Hyperlipidemia  ~cont. statin therapy with Atorvastatin 40mg po qhs  ~takes Ezetimibe 10mg po daily (non-formulary)    #Gout  ~will hold Colchicine (takes 0.6mg po daily)  ~cont. Allopurinol 100mg po daily    #BPH  ~cont. Tamsulosin 0.4mg po qhs    #Vte ppx  ~cont. Heparin sq

## 2022-05-13 NOTE — ED PROVIDER NOTE - CLINICAL SUMMARY MEDICAL DECISION MAKING FREE TEXT BOX
77 y/o male with a PMHx of bladder CA current in remission x10 years, morbid obesity, HTN, anxiety, depression, BPH, neuropathy of upper LLE, renal calculi, and HLD presents with 2-3 days cough with mild HA, generalized weakness, fatigue, and repeated negative outpatient covid rapid. Pt BIBA from home s/p syncopal episode acute lightheadedness during mild exertion. Positive brief witnessed syncope. neuro exam nonfocal. VSS. plan: EKG, ct head, orthostatic bp/p. labs including coags, troponin, covid swab, cautious IV fluids, fall precautions, expect tele admissions 75 y/o male with a PMHx of bladder CA current in remission x10 years, morbid obesity, HTN, anxiety, depression, BPH, neuropathy of upper LLE, renal calculi, and HLD presents with 2-3 days cough with mild HA, generalized weakness, fatigue, and repeated negative outpatient covid rapid. Pt BIBA from home s/p syncopal episode acute lightheadedness during mild exertion. Positive brief witnessed syncope. neuro exam nonfocal. VSS.   plan: EKG, ct head, orthostatic bp/p. labs including coags, troponin, covid swab, cautious IV fluids, fall precautions, expect tele admission

## 2022-05-13 NOTE — PATIENT PROFILE ADULT - FALL HARM RISK - HARM RISK INTERVENTIONS

## 2022-05-13 NOTE — ED PROVIDER NOTE - PROGRESS NOTE DETAILS
[place] for attending Dr. Mazariegos case discussed with pt pcp Dr. Mariano ([phone]. pt had full syncopal episode at home +PMH morbid obesity, chronic CP and SOB, HTN, HOD, chronic back pain. Had recent in patient workup last year. positive recent URI symptoms with cough outpatient rapid COVID negative. he requests tele admission consideration. [place] for attending Dr. Mazariegos case discussed with pt pcp Dr. Mariano ([phone]. pt had full syncopal episode at home +PMH morbid obesity, chronic CP and SOB, HTN, HLD, chronic back pain. Had recent in patient workup last year. positive recent URI symptoms with cough outpatient rapid COVID negative. he requests tele admission consideration. [place] for attending Dr. Mazariegos case discussed with pt pcp Dr. Mariano ([phone]). pt had full syncopal episode at home +PMH morbid obesity, chronic CP and SOB, HTN, HLD, chronic back pain. Had recent inpatient workup last year. positive recent URI symptoms with cough outpatient rapid COVID negative. he requests tele admission consideration.

## 2022-05-13 NOTE — H&P ADULT - NSICDXPASTSURGICALHX_GEN_ALL_CORE_FT
PAST SURGICAL HISTORY:  H/O cystoscopy TURB, BCG instillation x2 --2013, Bladder cancer    H/O knee surgery arthroscopy does not recall which knee 15 yrs ago    H/O shoulder surgery right shoulder tendon repair-2014    History of nephrolithotomy with removal of calculi Staghorn calculus left kidney--2010    Previous back surgery thoracic/lumbar x2---last 2011 fusion with instrumentation     PAST SURGICAL HISTORY:  H/O cystoscopy TURB, BCG instillation x2 --2013, Bladder cancer    H/O knee surgery arthroscopy does not recall which knee 15 yrs ago    H/O shoulder surgery right shoulder tendon repair-2014    History of knee surgery Left knee arthroscopy with debridement, 10/15/2021    History of nephrolithotomy with removal of calculi Staghorn calculus left kidney--2010    Previous back surgery thoracic/lumbar x2---last 2011 fusion with instrumentation

## 2022-05-14 DIAGNOSIS — Z98.890 OTHER SPECIFIED POSTPROCEDURAL STATES: Chronic | ICD-10-CM

## 2022-05-14 LAB
ALBUMIN SERPL ELPH-MCNC: 3.2 G/DL — LOW (ref 3.3–5)
ALP SERPL-CCNC: 70 U/L — SIGNIFICANT CHANGE UP (ref 40–120)
ALT FLD-CCNC: 54 U/L — SIGNIFICANT CHANGE UP (ref 12–78)
ANION GAP SERPL CALC-SCNC: 8 MMOL/L — SIGNIFICANT CHANGE UP (ref 5–17)
APPEARANCE UR: CLEAR — SIGNIFICANT CHANGE UP
AST SERPL-CCNC: 44 U/L — HIGH (ref 15–37)
BASOPHILS # BLD AUTO: 0.02 K/UL — SIGNIFICANT CHANGE UP (ref 0–0.2)
BASOPHILS NFR BLD AUTO: 0.4 % — SIGNIFICANT CHANGE UP (ref 0–2)
BILIRUB SERPL-MCNC: 0.4 MG/DL — SIGNIFICANT CHANGE UP (ref 0.2–1.2)
BILIRUB UR-MCNC: NEGATIVE — SIGNIFICANT CHANGE UP
BUN SERPL-MCNC: 45 MG/DL — HIGH (ref 7–23)
CALCIUM SERPL-MCNC: 8.9 MG/DL — SIGNIFICANT CHANGE UP (ref 8.5–10.1)
CHLORIDE SERPL-SCNC: 109 MMOL/L — HIGH (ref 96–108)
CO2 SERPL-SCNC: 22 MMOL/L — SIGNIFICANT CHANGE UP (ref 22–31)
COLOR SPEC: YELLOW — SIGNIFICANT CHANGE UP
CREAT ?TM UR-MCNC: 120 MG/DL — SIGNIFICANT CHANGE UP
CREAT SERPL-MCNC: 2.04 MG/DL — HIGH (ref 0.5–1.3)
DIFF PNL FLD: NEGATIVE — SIGNIFICANT CHANGE UP
EGFR: 33 ML/MIN/1.73M2 — LOW
EOSINOPHIL # BLD AUTO: 0.1 K/UL — SIGNIFICANT CHANGE UP (ref 0–0.5)
EOSINOPHIL NFR BLD AUTO: 2 % — SIGNIFICANT CHANGE UP (ref 0–6)
GLUCOSE SERPL-MCNC: 102 MG/DL — HIGH (ref 70–99)
GLUCOSE UR QL: NEGATIVE — SIGNIFICANT CHANGE UP
HCT VFR BLD CALC: 34 % — LOW (ref 39–50)
HGB BLD-MCNC: 11 G/DL — LOW (ref 13–17)
IMM GRANULOCYTES NFR BLD AUTO: 0.4 % — SIGNIFICANT CHANGE UP (ref 0–1.5)
KETONES UR-MCNC: NEGATIVE — SIGNIFICANT CHANGE UP
LEUKOCYTE ESTERASE UR-ACNC: NEGATIVE — SIGNIFICANT CHANGE UP
LYMPHOCYTES # BLD AUTO: 1.03 K/UL — SIGNIFICANT CHANGE UP (ref 1–3.3)
LYMPHOCYTES # BLD AUTO: 21 % — SIGNIFICANT CHANGE UP (ref 13–44)
MCHC RBC-ENTMCNC: 30 PG — SIGNIFICANT CHANGE UP (ref 27–34)
MCHC RBC-ENTMCNC: 32.4 GM/DL — SIGNIFICANT CHANGE UP (ref 32–36)
MCV RBC AUTO: 92.6 FL — SIGNIFICANT CHANGE UP (ref 80–100)
MONOCYTES # BLD AUTO: 0.71 K/UL — SIGNIFICANT CHANGE UP (ref 0–0.9)
MONOCYTES NFR BLD AUTO: 14.5 % — HIGH (ref 2–14)
NEUTROPHILS # BLD AUTO: 3.03 K/UL — SIGNIFICANT CHANGE UP (ref 1.8–7.4)
NEUTROPHILS NFR BLD AUTO: 61.7 % — SIGNIFICANT CHANGE UP (ref 43–77)
NITRITE UR-MCNC: NEGATIVE — SIGNIFICANT CHANGE UP
PH UR: 5 — SIGNIFICANT CHANGE UP (ref 5–8)
PLATELET # BLD AUTO: 145 K/UL — LOW (ref 150–400)
POTASSIUM SERPL-MCNC: 3.9 MMOL/L — SIGNIFICANT CHANGE UP (ref 3.5–5.3)
POTASSIUM SERPL-SCNC: 3.9 MMOL/L — SIGNIFICANT CHANGE UP (ref 3.5–5.3)
POTASSIUM UR-SCNC: 23.9 MMOL/L — SIGNIFICANT CHANGE UP
PROT ?TM UR-MCNC: 28 MG/DL — HIGH (ref 0–12)
PROT SERPL-MCNC: 6.7 GM/DL — SIGNIFICANT CHANGE UP (ref 6–8.3)
PROT UR-MCNC: SIGNIFICANT CHANGE UP MG/DL
PROT/CREAT UR-RTO: 0.2 RATIO — SIGNIFICANT CHANGE UP (ref 0–0.2)
RBC # BLD: 3.67 M/UL — LOW (ref 4.2–5.8)
RBC # FLD: 14.7 % — HIGH (ref 10.3–14.5)
SODIUM SERPL-SCNC: 139 MMOL/L — SIGNIFICANT CHANGE UP (ref 135–145)
SODIUM UR-SCNC: 94 MMOL/L — SIGNIFICANT CHANGE UP
SP GR SPEC: 1.02 — SIGNIFICANT CHANGE UP (ref 1.01–1.02)
UROBILINOGEN FLD QL: NEGATIVE — SIGNIFICANT CHANGE UP
UUN UR-MCNC: 718 MG/DL — SIGNIFICANT CHANGE UP
WBC # BLD: 4.91 K/UL — SIGNIFICANT CHANGE UP (ref 3.8–10.5)
WBC # FLD AUTO: 4.91 K/UL — SIGNIFICANT CHANGE UP (ref 3.8–10.5)

## 2022-05-14 RX ORDER — SERTRALINE 25 MG/1
1 TABLET, FILM COATED ORAL
Qty: 0 | Refills: 0 | DISCHARGE

## 2022-05-14 RX ORDER — ATORVASTATIN CALCIUM 80 MG/1
1 TABLET, FILM COATED ORAL
Qty: 0 | Refills: 0 | DISCHARGE

## 2022-05-14 RX ORDER — HEPARIN SODIUM 5000 [USP'U]/ML
7500 INJECTION INTRAVENOUS; SUBCUTANEOUS EVERY 12 HOURS
Refills: 0 | Status: DISCONTINUED | OUTPATIENT
Start: 2022-05-14 | End: 2022-05-15

## 2022-05-14 RX ORDER — ALLOPURINOL 300 MG
100 TABLET ORAL DAILY
Refills: 0 | Status: DISCONTINUED | OUTPATIENT
Start: 2022-05-14 | End: 2022-05-15

## 2022-05-14 RX ORDER — ACETAMINOPHEN 500 MG
650 TABLET ORAL EVERY 6 HOURS
Refills: 0 | Status: DISCONTINUED | OUTPATIENT
Start: 2022-05-14 | End: 2022-05-15

## 2022-05-14 RX ORDER — FLUTICASONE PROPIONATE 50 MCG
1 SPRAY, SUSPENSION NASAL
Qty: 0 | Refills: 0 | DISCHARGE

## 2022-05-14 RX ORDER — TAMSULOSIN HYDROCHLORIDE 0.4 MG/1
0.4 CAPSULE ORAL AT BEDTIME
Refills: 0 | Status: DISCONTINUED | OUTPATIENT
Start: 2022-05-14 | End: 2022-05-15

## 2022-05-14 RX ORDER — ASPIRIN/CALCIUM CARB/MAGNESIUM 324 MG
81 TABLET ORAL DAILY
Refills: 0 | Status: DISCONTINUED | OUTPATIENT
Start: 2022-05-14 | End: 2022-05-15

## 2022-05-14 RX ORDER — SODIUM CHLORIDE 9 MG/ML
1000 INJECTION INTRAMUSCULAR; INTRAVENOUS; SUBCUTANEOUS
Refills: 0 | Status: DISCONTINUED | OUTPATIENT
Start: 2022-05-14 | End: 2022-05-15

## 2022-05-14 RX ORDER — CHOLECALCIFEROL (VITAMIN D3) 125 MCG
2 CAPSULE ORAL
Qty: 0 | Refills: 0 | DISCHARGE

## 2022-05-14 RX ORDER — AMLODIPINE BESYLATE 2.5 MG/1
10 TABLET ORAL DAILY
Refills: 0 | Status: DISCONTINUED | OUTPATIENT
Start: 2022-05-14 | End: 2022-05-15

## 2022-05-14 RX ORDER — POTASSIUM CITRATE MONOHYDRATE 100 %
0 POWDER (GRAM) MISCELLANEOUS
Qty: 0 | Refills: 0 | DISCHARGE

## 2022-05-14 RX ORDER — LANOLIN ALCOHOL/MO/W.PET/CERES
3 CREAM (GRAM) TOPICAL AT BEDTIME
Refills: 0 | Status: DISCONTINUED | OUTPATIENT
Start: 2022-05-14 | End: 2022-05-15

## 2022-05-14 RX ORDER — SERTRALINE 25 MG/1
2 TABLET, FILM COATED ORAL
Qty: 0 | Refills: 0 | DISCHARGE

## 2022-05-14 RX ORDER — FLUTICASONE PROPIONATE 50 MCG
1 SPRAY, SUSPENSION NASAL
Refills: 0 | Status: DISCONTINUED | OUTPATIENT
Start: 2022-05-14 | End: 2022-05-15

## 2022-05-14 RX ORDER — LOSARTAN POTASSIUM 100 MG/1
1 TABLET, FILM COATED ORAL
Qty: 0 | Refills: 0 | DISCHARGE

## 2022-05-14 RX ORDER — SODIUM CHLORIDE 9 MG/ML
1000 INJECTION INTRAMUSCULAR; INTRAVENOUS; SUBCUTANEOUS
Refills: 0 | Status: COMPLETED | OUTPATIENT
Start: 2022-05-14 | End: 2022-05-14

## 2022-05-14 RX ORDER — ATORVASTATIN CALCIUM 80 MG/1
40 TABLET, FILM COATED ORAL AT BEDTIME
Refills: 0 | Status: DISCONTINUED | OUTPATIENT
Start: 2022-05-14 | End: 2022-05-15

## 2022-05-14 RX ORDER — METOPROLOL TARTRATE 50 MG
50 TABLET ORAL DAILY
Refills: 0 | Status: DISCONTINUED | OUTPATIENT
Start: 2022-05-14 | End: 2022-05-15

## 2022-05-14 RX ORDER — EZETIMIBE 10 MG/1
10 TABLET ORAL DAILY
Refills: 0 | Status: DISCONTINUED | OUTPATIENT
Start: 2022-05-14 | End: 2022-05-15

## 2022-05-14 RX ORDER — ONDANSETRON 8 MG/1
4 TABLET, FILM COATED ORAL EVERY 8 HOURS
Refills: 0 | Status: DISCONTINUED | OUTPATIENT
Start: 2022-05-14 | End: 2022-05-15

## 2022-05-14 RX ADMIN — TAMSULOSIN HYDROCHLORIDE 0.4 MILLIGRAM(S): 0.4 CAPSULE ORAL at 09:46

## 2022-05-14 RX ADMIN — Medication 1200 MILLIGRAM(S): at 21:23

## 2022-05-14 RX ADMIN — SODIUM CHLORIDE 70 MILLILITER(S): 9 INJECTION INTRAMUSCULAR; INTRAVENOUS; SUBCUTANEOUS at 05:58

## 2022-05-14 RX ADMIN — EZETIMIBE 10 MILLIGRAM(S): 10 TABLET ORAL at 09:38

## 2022-05-14 RX ADMIN — Medication 100 MILLIGRAM(S): at 12:39

## 2022-05-14 RX ADMIN — Medication 81 MILLIGRAM(S): at 09:37

## 2022-05-14 RX ADMIN — HEPARIN SODIUM 7500 UNIT(S): 5000 INJECTION INTRAVENOUS; SUBCUTANEOUS at 09:38

## 2022-05-14 RX ADMIN — HEPARIN SODIUM 7500 UNIT(S): 5000 INJECTION INTRAVENOUS; SUBCUTANEOUS at 21:19

## 2022-05-14 RX ADMIN — Medication 50 MILLIGRAM(S): at 09:38

## 2022-05-14 RX ADMIN — Medication 1 SPRAY(S): at 21:23

## 2022-05-14 RX ADMIN — AMLODIPINE BESYLATE 10 MILLIGRAM(S): 2.5 TABLET ORAL at 09:42

## 2022-05-14 RX ADMIN — Medication 100 MILLIGRAM(S): at 09:37

## 2022-05-14 RX ADMIN — Medication 3 MILLIGRAM(S): at 21:24

## 2022-05-14 RX ADMIN — ATORVASTATIN CALCIUM 40 MILLIGRAM(S): 80 TABLET, FILM COATED ORAL at 09:46

## 2022-05-14 RX ADMIN — Medication 650 MILLIGRAM(S): at 12:39

## 2022-05-14 NOTE — CONSULT NOTE ADULT - SUBJECTIVE AND OBJECTIVE BOX
77 y/o M PMHx significant for bladder CA current in remission x10 years, chronic back pain, morbid obesity, Hypertension, Hyperlipidemia, anxiety, depression, BPH, and neuropathy was BIBA from home s/p syncopal episode. Of note the patient was recently diagnosed with a upper respiratory infection and had associated generalized weakness, fatigue, productive cough, sinus congestion, and headaches. The patient has had 3 outpatient negative rapid COVID tests.   The patient states he became acutely lightheaded after working outside in the garage and pt was sweating profusley and not drinking water as he usually does. pt states he did feel light headed as he was bending down and standing up when working yesterday prompting him to sit down against the wall, then syncopized. The patient's syncopal episode was witnessed by the patient's family. The episode lasted less than one minute. The patient denies any associated prodrome of chest pain, shortness of breath.     Renal eval called for ALBERT on CKD 4 = Cr ~ 1.6- 1.7 in past - followed by Nephrologist in [place]     today feeling better , denies any symptoms      PAST MEDICAL & SURGICAL HISTORY:  HTN (hypertension)  HLD (hyperlipidemia)  Bladder cancerDx   Kidney stones  Neuropathyleft upper leg  Arthritis  Torn meniscusleft  Torn tendon  right biceps    Anxiety and depression    BPH (benign prostatic hyperplasia)    Bursitis of both hips  Morbid obesity  COVID-19 vaccine series completed  Completed 2021--Booster 2021  Mild mitral regurgitation  Previous back surgery  thoracic/lumbar x2---last  fusion with instrumentation  H/O cystoscopyTURB, BCG instillation x2 --, Bladder cancer  History of nephrolithotomy with removal of calculi Staghorn calculus left kidney--    History of knee surgery  Left knee arthroscopy with debridement, 10/15/2021    Home Medications:  allopurinol 100 mg oral tablet: 1 tab(s) orally once a day   (14 May 2022 00:24)  amLODIPine 10 mg oral tablet: 1 tab(s) orally once a day (14 May 2022 00:24)  aspirin 81 mg oral delayed release tablet: 1 tab(s) orally once a day--last dose 10/7/2021 (14 May 2022 00:24)  atorvastatin 40 mg oral tablet: 1 tab(s) orally once a day (14 May 2022 00:24)  Centrum Silver oral tablet: 1 tab(s) orally once a day (14 May 2022 00:24)  chlorthalidone 25 mg oral tablet: 1 tab(s) orally once a day (14 May 2022 00:24)  colchicine 0.6 mg oral tablet: 1 tab(s) orally once a day (14 May 2022 00:24)  ezetimibe 10 mg oral tablet: 1 tab(s) orally once a day (14 May 2022 00:24)  fluticasone 50 mcg/inh nasal spray: 2 spray(s) in each nostril once a day (in the evening) (14 May 2022 00:24)  losartan 50 mg oral tablet: 1 tab(s) orally once a day (14 May 2022 00:24)  Metoprolol Succinate ER 50 mg oral tablet, extended release: 1 tab(s) orally once a day (14 May 2022 00:24)  tamsulosin 0.4 mg oral capsule: 1 cap(s) orally once a day (14 May 2022 00:24)      MEDICATIONS  (STANDING):  allopurinol 100 milliGRAM(s) Oral daily  amLODIPine   Tablet 10 milliGRAM(s) Oral daily  aspirin enteric coated 81 milliGRAM(s) Oral daily  atorvastatin 40 milliGRAM(s) Oral at bedtime  ezetimibe 10 milliGRAM(s) Oral daily  fluticasone propionate 50 MICROgram(s)/spray Nasal Spray 1 Spray(s) Both Nostrils two times a day  guaiFENesin ER 1200 milliGRAM(s) Oral every 12 hours  heparin   Injectable 7500 Unit(s) SubCutaneous every 12 hours  metoprolol succinate ER 50 milliGRAM(s) Oral daily  sodium chloride 0.9%. 1000 milliLiter(s) (75 mL/Hr) IV Continuous <Continuous>  tamsulosin 0.4 milliGRAM(s) Oral at bedtime      Allergies    No Known Allergies    Intolerances        SOCIAL HISTORY:  Denies ETOh,Smoking,     FAMILY HISTORY:  FH: heart disease (Mother)    FHx: lung cancer (Father)        REVIEW OF SYSTEMS:    CONSTITUTIONAL: No weakness, fevers or chills  EYES/ENT: No visual changes;  No vertigo or throat pain   NECK: No pain or stiffness  RESPIRATORY: No cough, wheezing, hemoptysis; No shortness of breath  CARDIOVASCULAR: No chest pain or palpitations  GASTROINTESTINAL: No abdominal or epigastric pain. No nausea, vomiting, or hematemesis; No diarrhea or constipation. No melena or hematochezia.  GENITOURINARY: No dysuria, frequency or hematuria  NEUROLOGICAL: No numbness or weakness  SKIN: No itching, burning, rashes, or lesions   All other review of systems is negative unless indicated above.    VITAL:  T(C): , Max: 37.1 (22 @ 16:23)  T(F): , Max: 98.7 (22 @ 16:23)  HR: 74 (22 @ 16:23)  BP: 135/63 (22 @ 16:23)  BP(mean): 89 (22 @ 20:53)  RR: 18 (22 @ 16:23)  SpO2: 95% (22 @ 16:23)  Wt(kg): --    Orthostatic VS    22 @ 06:24  Lying BP: 133/60 HR: 82   Sitting BP: 119/71 HR: 86  Standing BP: 137/65 HR: 87  Site: upper right arm   Mode: electronic    PHYSICAL EXAM:    Constitutional: NAD  HEENT:EOMI,  MMM  Neck: No LAD, No JVD  Respiratory: CTAB  Cardiovascular: S1 and S2  Gastrointestinal: BS+, soft, NT/ND  Extremities: No peripheral edema  Neurological: A/O x 3, no focal deficits  : No [place]  Skin: No rashes  Access: Not applicable    LABS:             139    |  109    |  45     ----------------------------<  102       14 May 2022 07:24  3.9     |  22     |  2.04     142    |  110    |  39     ----------------------------<  135       13 May 2022 15:38  4.0     |  24     |  2.51     Ca    8.9        14 May 2022 07:24  Ca    9.1        13 May 2022 15:38      Mg     2.5       13 May 2022 15:38    TPro  6.7    /  Alb  3.2    /  TBili  0.4    /        14 May 2022 07:24  DBili  x      /  AST  44     /  ALT  54     /  AlkPhos  70       TPro  7.4    /  Alb  3.6    /  TBili  0.4    /        13 May 2022 15:38  DBili  x      /  AST  51     /  ALT  64     /  AlkPhos  78                      11.0   4.91  )-----------( 145      ( 14 May 2022 07:24 )             34.0         Ca    8.9      14 May 2022 07:24  Mg     2.5     05-13    TPro  6.7  /  Alb  3.2<L>  /  TBili  0.4  /  DBili  x   /  AST  44<H>  /  ALT  54  /  AlkPhos  70  05-14      Urine Studies:  Urinalysis Basic - ( 14 May 2022 06:00 )    Color: Yellow / Appearance: Clear / S.020 / pH: x  Gluc: x / Ketone: Negative  / Bili: Negative / Urobili: Negative   Blood: x / Protein: Trace mg/dL / Nitrite: Negative   Leuk Esterase: Negative / RBC: 0-2 /HPF / WBC 0-2   Sq Epi: x / Non Sq Epi: Few / Bacteria: Few          RADIOLOGY & ADDITIONAL STUDIES:                    
Patient is a 76y old  Male who presents with a chief complaint of Syncope (14 May 2022 09:31)  22- Cardiology Consultation: 76 year old man presented after a self limited syncopal episode. He had an acute respiratory infection with malaise,  cough, wheeze and was treated about 1 week ago at urgent care clinic with tessalon and a nasal spray. His appetite became poor and he was not eating or drinking normally  for several  days. 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 He sa to rest then got up and felt like he would pass out so he sat down again and did lose consciousness for about 30 seconds. He fell back and struck a tree but did not sustain any injury. He denies chest pain or palpitation. In  he sustained a syncopal episode after a large meal and hospital evaluation was negative for arrhythmia.No jennie history of LV dysfunction, MI, arrhythmia. Stress test with Dr. Brand in the past was normal.    HPI:  77 y/o M PMHx significant for bladder CA current in remission x10 years, chronic back pain, morbid obesity, Hypertension, Hyperlipidemia, anxiety, depression, BPH, and neuropathy was BIBA from home s/p syncopal episode. Of note the patient was recently diagnosed with a upper respiratory infection and had associated generalized weakness, fatigue, productive cough, sinus congestion, and headaches. The patient has had 3 outpatient negative rapid COVID tests. Today the patient states he became acutely lightheaded prompting him to sit down against the wall, then syncopized. The patient's syncopal episode was witnessed by the patient's family. The episode lasted less than one minute. The patient denies any associated prodrome of chest pain, shortness of breath.   Labs => TnI (-) x 3, Cl 110, BUN/Cr 39/2.51. CT Head => No acute intracranial hemorrhage or mass effect. CXR => Clear lungs with no acute cardiopulmonary abnormalities. 2DECHO from 2021 =>  Estimated left ventricular ejection fraction is 55-60 %. The left ventricle is normal in size, wall thickness, wall motion and contractility as seen in limited views. The left atrium is moderately dilated. Mild to Moderate mitral regurgitation.   (13 May 2022 23:50)      PAST MEDICAL & SURGICAL HISTORY:  HTN (hypertension)      HLD (hyperlipidemia)      Bladder cancer  Dx       Kidney stones      Neuropathy  left upper leg      Arthritis      Torn meniscus  left      Torn tendon  right biceps      Anxiety and depression      BPH (benign prostatic hyperplasia)      Bursitis of both hips      Morbid obesity      COVID-19 vaccine series completed  Completed 2021--Booster 2021      Mild mitral regurgitation      Previous back surgery  thoracic/lumbar x2---last  fusion with instrumentation      H/O shoulder surgery  right shoulder tendon repair-      H/O knee surgery  arthroscopy does not recall which knee 15 yrs ago      H/O cystoscopy  TURB, BCG instillation x2 --, Bladder cancer      History of nephrolithotomy with removal of calculi  Staghorn calculus left kidney--      History of knee surgery  Left knee arthroscopy with debridement, 10/15/2021      Allergies and Intolerances:        Allergies:  	No Known Allergies:     Home Medications:   * Incomplete Medication History as of 14-May-2022 00:24 documented in Structured Notes  · 	colchicine 0.6 mg oral tablet: Last Dose Taken:  , 1 tab(s) orally once a day  · 	aspirin 81 mg oral delayed release tablet: Last Dose Taken:  , 1 tab(s) orally once a day--last dose 10/7/2021  · 	ezetimibe 10 mg oral tablet: Last Dose Taken:  , 1 tab(s) orally once a day  · 	Metoprolol Succinate ER 50 mg oral tablet, extended release: Last Dose Taken:  , 1 tab(s) orally once a day  · 	allopurinol 100 mg oral tablet: Last Dose Taken:  , 1 tab(s) orally once a day  	  · 	chlorthalidone 25 mg oral tablet: Last Dose Taken:  , 1 tab(s) orally once a day  · 	Centrum Silver oral tablet: Last Dose Taken:  , 1 tab(s) orally once a day  · 	tamsulosin 0.4 mg oral capsule: Last Dose Taken:  , 1 cap(s) orally once a day  · 	amLODIPine 10 mg oral tablet: Last Dose Taken:  , 1 tab(s) orally once a day  · 	losartan 50 mg oral tablet: Last Dose Taken:  , 1 tab(s) orally once a day  · 	fluticasone 50 mcg/inh nasal spray: Last Dose Taken:  , 2 spray(s) in each nostril once a day (in the evening)  · 	atorvastatin 40 mg oral tablet: Last Dose Taken:  , 1 tab(s) orally once a day        MEDICATIONS  (STANDING):  allopurinol 100 milliGRAM(s) Oral daily  amLODIPine   Tablet 10 milliGRAM(s) Oral daily  aspirin enteric coated 81 milliGRAM(s) Oral daily  atorvastatin 40 milliGRAM(s) Oral at bedtime  ezetimibe 10 milliGRAM(s) Oral daily  heparin   Injectable 7500 Unit(s) SubCutaneous every 12 hours  metoprolol succinate ER 50 milliGRAM(s) Oral daily  tamsulosin 0.4 milliGRAM(s) Oral at bedtime    MEDICATIONS  (PRN):  acetaminophen     Tablet .. 650 milliGRAM(s) Oral every 6 hours PRN Temp greater or equal to 38C (100.4F), Mild Pain (1 - 3)  aluminum hydroxide/magnesium hydroxide/simethicone Suspension 30 milliLiter(s) Oral every 4 hours PRN Dyspepsia  melatonin 3 milliGRAM(s) Oral at bedtime PRN Insomnia  ondansetron Injectable 4 milliGRAM(s) IV Push every 8 hours PRN Nausea and/or Vomiting      FAMILY HISTORY:  FH: heart disease (Mother)    FHx: lung cancer (Father)      Social History:  Social History (marital status, living situation, occupation, tobacco use, alcohol and drug use, and sexual history): Former smoker quit   No EtOH  No drug use  Uses walker prn      Tobacco Screening:  · Core Measure Site	Yes  · Has the patient used tobacco in the past 30 days?	No    SOCIAL HISTORY:  Tobacco-           Alcohol-              Illicit drugs-              Occupation-              Marital  status-  REVIEW OF SYSTEM:  Pertinent items are noted in HPI.    Vital Signs Last 24 Hrs  T(C): 37 (14 May 2022 08:36), Max: 37 (14 May 2022 08:36)  T(F): 98.6 (14 May 2022 08:36), Max: 98.6 (14 May 2022 08:36)  HR: 76 (14 May 2022 08:36) (76 - 87)  BP: 158/73 (14 May 2022 08:36) (100/61 - 158/73)  BP(mean): 89 (13 May 2022 20:53) (89 - 97)  RR: 19 (14 May 2022 08:36) (18 - 19)  SpO2: 98% (14 May 2022 08:36) (95% - 99%)    I&O's Summary    PHYSICAL EXAM  General Appearance:   HEENT: PERRL, conjunctiva clear, EOM's intact, non injected pharynx, no exudate, TM   normal  Neck: Supple, , no adenopathy, thyroid: not enlarged, no carotid bruit or JVD  Back: Symmetric, no  tenderness,no soft tissue tenderness  Lungs: Clear to auscultation bilaterally,no adventitious breath sounds, normal   expiratory phase  Heart: Regular rate and rhythm, S1, S2 normal,1/6 systolic murmur LSB  Abdomen: Soft, non-tender, bowel sounds active , no hepatosplenomegaly  Extremities: no cyanosis or edema, no joint swelling  Skin: Skin color, texture normal, no rashes   Neurologic: Alert and oriented X3 , cranial nerves intact, sensory and motor normal,        INTERPRETATION OF TELEMETRY: RSR with rare VPCs.    ECG: sinus rhythm 75 BPM, minimal criteria for LVH, IWMI age undetermined. No change from .        LABS:                          11.0   4.91  )-----------( 145      ( 14 May 2022 07:24 )             34.0     05-14    139  |  109<H>  |  45<H>  ----------------------------<  102<H>  3.9   |  22  |  2.04<H>    Ca    8.9      14 May 2022 07:24  Mg     2.5     05-13    TPro  6.7  /  Alb  3.2<L>  /  TBili  0.4  /  DBili  x   /  AST  44<H>  /  ALT  54  /  AlkPhos  70  05-14            PT/INR - ( 13 May 2022 15:38 )   PT: 12.6 sec;   INR: 1.09 ratio         PTT - ( 13 May 2022 15:38 )  PTT:28.2 sec  Urinalysis Basic - ( 14 May 2022 06:00 )    Color: Yellow / Appearance: Clear / S.020 / pH: x  Gluc: x / Ketone: Negative  / Bili: Negative / Urobili: Negative   Blood: x / Protein: Trace mg/dL / Nitrite: Negative   Leuk Esterase: Negative / RBC: 0-2 /HPF / WBC 0-2   Sq Epi: x / Non Sq Epi: Few / Bacteria: Few            RADIOLOGY & ADDITIONAL STUDIES:    IMPRESSION:  1. Syncope most likely due to orthostasis due to poor oral intake for a few days on top of taking antihypertensive medication. He appears stable at present.  2. Abnormal EKG  but unchanged from 2020. Inferior Q waves are old and he is without known clinical MI.  PLAN:  Agree with IV hydration. Hold chlorthalidone until volume status is restored. His other meds may be continued. Monitor orthostatic BPs. Will follow.

## 2022-05-14 NOTE — CONSULT NOTE ADULT - ASSESSMENT
77 yo male w hx of Bladder Ca, hx of nephrolithaisis, , CKD 3 Cr 1.6- 1.7 in past, HTN, Obesity presenting with syncope witnessed with postural changes and oral diuretics/bp meds at home w poor po intake recenlty due to URI     ALBERT on CKD 3- prerenal /hypotension related  Cr 1.6- 1.7 in past  w poor po intake hx    Cr improving with IVF - continue till AM and ressess    Orthostatic negative here   hold ARB/Chlorthalidone for now - BP stable   urine lytes   renal sono/bladder scan w hx of bladder cancer      Syncope - postural effect /vagal ?    cardiology /medicine fup     * pt seen earlier today     Thank you for the courtesy of this consult. We will follow this patient with you.   Management is subject to change if new information becomes available or patient condition changes.

## 2022-05-14 NOTE — PROGRESS NOTE ADULT - ASSESSMENT
75 y/o M PMHx significant for bladder CA current in remission x10 years, chronic back pain, morbid obesity, Hypertension, Hyperlipidemia, anxiety, depression, BPH, and neuropathy         #Syncope  ~cont. to observe on Telemetry  ~serial Marty/EKGs  ~f/u w/ Cardiology in the am  ~f/u 2DECHO  ~fall precautions  ~bed alarm    #ALBERT on CKD3  ~cont. trial IV hydration  ~f/u urine studies  ~f/u w/ Nephrology in the am  ~will hold Losartan 50mg po daily    #Hypertension  ~will hold Chlorthalidone (takes 25mg po daily)  ~cont. Amlodipine 10mg po daily  ~cont. Metoprolol ER 50mg po daily    #Hyperlipidemia  ~cont. statin therapy with Atorvastatin 40mg po qhs  ~takes Ezetimibe 10mg po daily (non-formulary)    #Gout  ~will hold Colchicine (takes 0.6mg po daily)  ~cont. Allopurinol 100mg po daily    #BPH  ~cont. Tamsulosin 0.4mg po qhs    #Vte ppx  ~cont. Heparin sq   75 y/o M PMHx significant for bladder CA current in remission x10 years, chronic back pain, morbid obesity, Hypertension, Hyperlipidemia, anxiety, depression, BPH, and neuropathy admitted for:     # Syncope  - suspect vasovagal vs orthostatic, in settings of ALBERT, upper respiratory infection,   and dehydration   -Trops neg   - CT head, neg for acute findings  - CXR : no PNA or effusions   - CXR: SR, no acute changes, Q waves in inferior leads ( noted in the past)  -F/u 2DECHO  - Check orthostatics ( but on IVF)   - montitor on tele  - fall precautions  - D/w Dr Ortiz     #ALBERT on CKD3  - baseline CR ~ 1.3-1.7.  CR  at 2.5 on admission   - C/w  IV hydration  - Hold diuretics and Losartan, hold Colchicine  - Kidney and Bladder sono  - Bladder scan neg for retention   -D/w Dr Banuelos       #Hypertension  - Borderline BP on presentation   -Cont to hold Chlorthalidone (takes 25mg po daily) and Losartan  -C/w  Amlodipine 10mg po daily and  Metoprolol ER 50mg po daily    #Hyperlipidemia  -C/w statin therapy with Atorvastatin 40mg po qhs  - takes  Ezetimibe 10mg po daily (non-formulary)    #Gout  - hold Colchicine (takes 0.6mg po daily)  -C/w Allopurinol 100mg po daily    #BPH  C/w Tamsulosin 0.4mg po qhs    #Vte ppx  -C/w  Heparin sq

## 2022-05-14 NOTE — PROGRESS NOTE ADULT - SUBJECTIVE AND OBJECTIVE BOX
CC: Syncope (13 May 2022 23:50)    HPI:  77 y/o M PMHx significant for bladder CA current in remission x10 years, chronic back pain, morbid obesity, Hypertension, Hyperlipidemia, anxiety, depression, BPH, and neuropathy was BIBA from home s/p syncopal episode. Of note the patient was recently diagnosed with a upper respiratory infection and had associated generalized weakness, fatigue, productive cough, sinus congestion, and headaches. The patient has had 3 outpatient negative rapid COVID tests. Today the patient states he became acutely lightheaded prompting him to sit down against the wall, then syncopized. The patient's syncopal episode was witnessed by the patient's family. The episode lasted less than one minute. The patient denies any associated prodrome of chest pain, shortness of breath.   Labs => TnI (-) x 3, Cl 110, BUN/Cr 39/2.51. CT Head => No acute intracranial hemorrhage or mass effect. CXR => Clear lungs with no acute cardiopulmonary abnormalities. 2DECHO from 2021 =>  Estimated left ventricular ejection fraction is 55-60 %. The left ventricle is normal in size, wall thickness, wall motion and contractility as seen in limited views. The left atrium is moderately dilated. Mild to Moderate mitral regurgitation.   (13 May 2022 23:50)    INTERVAL HPI/OVERNIGHT EVENTS:    Vital Signs Last 24 Hrs  T(C): 37 (14 May 2022 08:36), Max: 37 (14 May 2022 08:36)  T(F): 98.6 (14 May 2022 08:36), Max: 98.6 (14 May 2022 08:36)  HR: 76 (14 May 2022 08:36) (76 - 87)  BP: 158/73 (14 May 2022 08:36) (100/61 - 158/73)  BP(mean): 89 (13 May 2022 20:53) (89 - 97)  RR: 19 (14 May 2022 08:36) (18 - 19)  SpO2: 98% (14 May 2022 08:36) (95% - 99%)  I&O's Detail    REVIEW OF SYSTEMS:    CONSTITUTIONAL: No weakness, fevers or chills  EYES/ENT: No visual changes;  No vertigo or throat pain   NECK: No pain or stiffness  RESPIRATORY: No cough, wheezing, hemoptysis; No shortness of breath  CARDIOVASCULAR: No chest pain or palpitations  GASTROINTESTINAL: No abdominal or epigastric pain. No nausea, vomiting, or hematemesis; No diarrhea or constipation. No melena or hematochezia.  GENITOURINARY: No dysuria, frequency or hematuria  NEUROLOGICAL: No numbness or weakness  SKIN: No itching, burning, rashes, or lesions   All other review of systems is negative unless indicated above.  PHYSICAL EXAM:    General: Well developed; well nourished; in no acute distress  Eyes: PERRLA, EOMI; conjunctiva and sclera clear  Head: Normocephalic; atraumatic  ENMT: No nasal discharge; airway clear  Neck: Supple; non tender; no masses  Respiratory: No wheezes, rales or rhonchi  Cardiovascular: Regular rate and rhythm. S1 and S2 Normal; No murmurs, gallops or rubs  Gastrointestinal: Soft non-tender non-distended; Normal bowel sounds  Genitourinary: No  suprapubic  tenderness  Extremities: Normal range of motion, No clubbing, cyanosis or edema  Vascular: Peripheral pulses palpable 2+ bilaterally  Neurological: Alert and oriented x4  Skin: Warm and dry. No acute rash  Lymph Nodes: No acute cervical adenopathy  Musculoskeletal: Normal muscle tone, without deformities  Psychiatric: Cooperative and appropriate                            11.0   4.91  )-----------( 145      ( 14 May 2022 07:24 )             34.0     14 May 2022 07:24    139    |  109    |  45     ----------------------------<  102    3.9     |  22     |  2.04     Ca    8.9        14 May 2022 07:24  Mg     2.5       13 May 2022 15:38    TPro  6.7    /  Alb  3.2    /  TBili  0.4    /  DBili  x      /  AST  44     /  ALT  54     /  AlkPhos  70     14 May 2022 07:24    PT/INR - ( 13 May 2022 15:38 )   PT: 12.6 sec;   INR: 1.09 ratio         PTT - ( 13 May 2022 15:38 )  PTT:28.2 sec  CAPILLARY BLOOD GLUCOSE        LIVER FUNCTIONS - ( 14 May 2022 07:24 )  Alb: 3.2 g/dL / Pro: 6.7 gm/dL / ALK PHOS: 70 U/L / ALT: 54 U/L / AST: 44 U/L / GGT: x           Urinalysis Basic - ( 14 May 2022 06:00 )    Color: Yellow / Appearance: Clear / S.020 / pH: x  Gluc: x / Ketone: Negative  / Bili: Negative / Urobili: Negative   Blood: x / Protein: Trace mg/dL / Nitrite: Negative   Leuk Esterase: Negative / RBC: 0-2 /HPF / WBC 0-2   Sq Epi: x / Non Sq Epi: Few / Bacteria: Few        MEDICATIONS  (STANDING):  allopurinol 100 milliGRAM(s) Oral daily  amLODIPine   Tablet 10 milliGRAM(s) Oral daily  aspirin enteric coated 81 milliGRAM(s) Oral daily  atorvastatin 40 milliGRAM(s) Oral at bedtime  ezetimibe 10 milliGRAM(s) Oral daily  heparin   Injectable 7500 Unit(s) SubCutaneous every 12 hours  metoprolol succinate ER 50 milliGRAM(s) Oral daily  tamsulosin 0.4 milliGRAM(s) Oral at bedtime    MEDICATIONS  (PRN):  acetaminophen     Tablet .. 650 milliGRAM(s) Oral every 6 hours PRN Temp greater or equal to 38C (100.4F), Mild Pain (1 - 3)  aluminum hydroxide/magnesium hydroxide/simethicone Suspension 30 milliLiter(s) Oral every 4 hours PRN Dyspepsia  melatonin 3 milliGRAM(s) Oral at bedtime PRN Insomnia  ondansetron Injectable 4 milliGRAM(s) IV Push every 8 hours PRN Nausea and/or Vomiting      RADIOLOGY & ADDITIONAL TESTS: CC: Syncope (13 May 2022 23:50)    HPI: 75 y/o M PMHx significant for bladder CA current in remission x10 years, chronic back pain, morbid obesity, Hypertension, Hyperlipidemia, anxiety, depression, BPH, and neuropathy was BIBA from home s/p syncopal episode. Of note the patient was recently diagnosed with a upper respiratory infection and had associated generalized weakness, fatigue, productive cough, sinus congestion, and headaches. The patient has had 3 outpatient negative rapid COVID tests. Today the patient states he became acutely lightheaded prompting him to sit down against the wall, then syncopized. The patient's syncopal episode was witnessed by the patient's family. The episode lasted less than one minute. The patient denies any associated prodrome of chest pain, shortness of breath.   Labs => TnI (-) x 3, Cl 110, BUN/Cr 39/2.51. CT Head => No acute intracranial hemorrhage or mass effect. CXR => Clear lungs with no acute cardiopulmonary abnormalities. 2DECHO from 2021 =>  Estimated left ventricular ejection fraction is 55-60 %. The left ventricle is normal in size, wall thickness, wall motion and contractility as seen in limited views. The left atrium is moderately dilated. Mild to Moderate mitral regurgitation.   (13 May 2022 23:50)    INTERVAL HPI /OVERNIGHT EVENTS: chart reviewed Pt was seen and examined, confirmed history above, reports that had similar episode in the past, but at that time happened after a meal. Admits that was not feeling well  past few days and didn't have good appetite. Denies dizziness or SOB or CP now.  POC discussed     Vital Signs Last 24 Hrs  T(C): 37 (14 May 2022 08:36), Max: 37 (14 May 2022 08:36)  T(F): 98.6 (14 May 2022 08:36), Max: 98.6 (14 May 2022 08:36)  HR: 76 (14 May 2022 08:36) (76 - 87)  BP: 158/73 (14 May 2022 08:36) (100/61 - 158/73)  BP(mean): 89 (13 May 2022 20:53) (89 - 97)  RR: 19 (14 May 2022 08:36) (18 - 19)  SpO2: 98% (14 May 2022 08:36) (95% - 99%)      REVIEW OF SYSTEMS:  All other review of systems is negative unless indicated above.      PHYSICAL EXAM:  General: Well developed;  in no acute distress  Eyes: EOMI; conjunctiva and sclera clear  Head: Normocephalic; atraumatic  ENMT: No nasal discharge; airway clear  Neck: Supple; non tender; no masses  Respiratory: No wheezes, rales or rhonchi  Cardiovascular: Regular rate and rhythm. S1 and S2 Normal; No murmurs, gallops or rubs  Gastrointestinal: Soft non-tender non-distended; Normal bowel sounds  Genitourinary: No  suprapubic  tenderness  Extremities: No  edema  Vascular: Peripheral pulses palpable 2+ bilaterally  Neurological: Alert and oriented x3, non focal   Skin: Warm and dry. No acute rash  Musculoskeletal: Normal muscle tone, without deformities  Psychiatric: Cooperative and appropriate      LABS:                         11.0   4.91  )-----------( 145      ( 14 May 2022 07:24 )             34.0     14 May 2022 07:24    139    |  109    |  45     ----------------------------<  102    3.9     |  22     |  2.04     Ca    8.9        14 May 2022 07:24  Mg     2.5       13 May 2022 15:38    TPro  6.7    /  Alb  3.2    /  TBili  0.4    /  DBili  x      /  AST  44     /  ALT  54     /  AlkPhos  70     14 May 2022 07:24  PT/INR - ( 13 May 2022 15:38 )   PT: 12.6 sec;   INR: 1.09 ratio    PTT - ( 13 May 2022 15:38 )  PTT:28.2 sec      LIVER FUNCTIONS - ( 14 May 2022 07:24 )  Alb: 3.2 g/dL / Pro: 6.7 gm/dL / ALK PHOS: 70 U/L / ALT: 54 U/L / AST: 44 U/L / GGT: x           Urinalysis Basic - ( 14 May 2022 06:00 )    Color: Yellow / Appearance: Clear / S.020 / pH: x  Gluc: x / Ketone: Negative  / Bili: Negative / Urobili: Negative   Blood: x / Protein: Trace mg/dL / Nitrite: Negative   Leuk Esterase: Negative / RBC: 0-2 /HPF / WBC 0-2   Sq Epi: x / Non Sq Epi: Few / Bacteria: Few        MEDICATIONS  (STANDING):  allopurinol 100 milliGRAM(s) Oral daily  amLODIPine   Tablet 10 milliGRAM(s) Oral daily  aspirin enteric coated 81 milliGRAM(s) Oral daily  atorvastatin 40 milliGRAM(s) Oral at bedtime  ezetimibe 10 milliGRAM(s) Oral daily  heparin   Injectable 7500 Unit(s) SubCutaneous every 12 hours  metoprolol succinate ER 50 milliGRAM(s) Oral daily  tamsulosin 0.4 milliGRAM(s) Oral at bedtime    MEDICATIONS  (PRN):  acetaminophen     Tablet .. 650 milliGRAM(s) Oral every 6 hours PRN Temp greater or equal to 38C (100.4F), Mild Pain (1 - 3)  aluminum hydroxide/magnesium hydroxide/simethicone Suspension 30 milliLiter(s) Oral every 4 hours PRN Dyspepsia  melatonin 3 milliGRAM(s) Oral at bedtime PRN Insomnia  ondansetron Injectable 4 milliGRAM(s) IV Push every 8 hours PRN Nausea and/or Vomiting      RADIOLOGY & ADDITIONAL TESTS:  < from: CT Head No Cont (22 @ 15:40) >    ACC: [id number] EXAM:  CT BRAIN                          PROCEDURE DATE:  2022          INTERPRETATION:  CT HEAD WITHOUT CONTRAST    INDICATION: 76 years old. Male. Syncope, simple, normal neuro exam.    COMPARISON: 2021.    TECHNIQUE: Noncontrast axial CT head was obtained from the skull base to   vertex.    FINDINGS:  No acute intracranial hemorrhage, mass effect or midline shift.  No CT evidence of acute large vascular territory infarct.  The ventricles and cortical sulci are prominent reflecting parenchymal   volume loss.  Scattered hypodensities in the periventricular white matter are   nonspecific, but likely sequela of small vessel ischemic disease.    Mild mucosal thickening of the visualized right maxillary sinus. The   mastoid air cells are well aerated. The native ocular lenses are   surgically absent.  No displaced calvarial fracture.    IMPRESSION:  No acute intracranial hemorrhage or mass effect.    < from: Xray Chest 1 View- PORTABLE-Urgent (Xray Chest 1 View- PORTABLE-Urgent .) (22 @ 16:42) >    ACC: [id number] EXAM:  XR CHEST PORTABLE URGENT 1V                          PROCEDURE DATE:  2022          INTERPRETATION:  INDICATION: Cough and syncope    COMPARISON: 2021    FINDINGS:  A frontal view of the chest demonstrates clear lungs with no infiltrates   or lung nodules. No pleural fluid is seen. No hilar or mediastinal   widening. Heart size is within normal limits, without CHF. The bony   thorax is grossly intact.    IMPRESSION: Clear lungs with no acute cardiopulmonary abnormalities.

## 2022-05-15 ENCOUNTER — TRANSCRIPTION ENCOUNTER (OUTPATIENT)
Age: 77
End: 2022-05-15

## 2022-05-15 VITALS
TEMPERATURE: 99 F | OXYGEN SATURATION: 94 % | DIASTOLIC BLOOD PRESSURE: 66 MMHG | HEART RATE: 78 BPM | SYSTOLIC BLOOD PRESSURE: 152 MMHG | RESPIRATION RATE: 18 BRPM

## 2022-05-15 LAB
ANION GAP SERPL CALC-SCNC: 8 MMOL/L — SIGNIFICANT CHANGE UP (ref 5–17)
BUN SERPL-MCNC: 43 MG/DL — HIGH (ref 7–23)
CALCIUM SERPL-MCNC: 8.9 MG/DL — SIGNIFICANT CHANGE UP (ref 8.5–10.1)
CHLORIDE SERPL-SCNC: 112 MMOL/L — HIGH (ref 96–108)
CO2 SERPL-SCNC: 22 MMOL/L — SIGNIFICANT CHANGE UP (ref 22–31)
CREAT SERPL-MCNC: 1.71 MG/DL — HIGH (ref 0.5–1.3)
EGFR: 41 ML/MIN/1.73M2 — LOW
GLUCOSE SERPL-MCNC: 100 MG/DL — HIGH (ref 70–99)
HCT VFR BLD CALC: 33.5 % — LOW (ref 39–50)
HGB BLD-MCNC: 10.8 G/DL — LOW (ref 13–17)
MCHC RBC-ENTMCNC: 30 PG — SIGNIFICANT CHANGE UP (ref 27–34)
MCHC RBC-ENTMCNC: 32.2 GM/DL — SIGNIFICANT CHANGE UP (ref 32–36)
MCV RBC AUTO: 93.1 FL — SIGNIFICANT CHANGE UP (ref 80–100)
PLATELET # BLD AUTO: 149 K/UL — LOW (ref 150–400)
POTASSIUM SERPL-MCNC: 3.9 MMOL/L — SIGNIFICANT CHANGE UP (ref 3.5–5.3)
POTASSIUM SERPL-SCNC: 3.9 MMOL/L — SIGNIFICANT CHANGE UP (ref 3.5–5.3)
RBC # BLD: 3.6 M/UL — LOW (ref 4.2–5.8)
RBC # FLD: 14.3 % — SIGNIFICANT CHANGE UP (ref 10.3–14.5)
SODIUM SERPL-SCNC: 142 MMOL/L — SIGNIFICANT CHANGE UP (ref 135–145)
TSH SERPL-MCNC: 2.44 UU/ML — SIGNIFICANT CHANGE UP (ref 0.34–4.82)
WBC # BLD: 3.84 K/UL — SIGNIFICANT CHANGE UP (ref 3.8–10.5)
WBC # FLD AUTO: 3.84 K/UL — SIGNIFICANT CHANGE UP (ref 3.8–10.5)

## 2022-05-15 RX ORDER — CHLORTHALIDONE 50 MG
1 TABLET ORAL
Qty: 0 | Refills: 0 | DISCHARGE

## 2022-05-15 RX ADMIN — Medication 100 MILLIGRAM(S): at 08:37

## 2022-05-15 RX ADMIN — Medication 1200 MILLIGRAM(S): at 08:34

## 2022-05-15 RX ADMIN — EZETIMIBE 10 MILLIGRAM(S): 10 TABLET ORAL at 08:37

## 2022-05-15 RX ADMIN — HEPARIN SODIUM 7500 UNIT(S): 5000 INJECTION INTRAVENOUS; SUBCUTANEOUS at 08:35

## 2022-05-15 RX ADMIN — Medication 50 MILLIGRAM(S): at 08:33

## 2022-05-15 RX ADMIN — AMLODIPINE BESYLATE 10 MILLIGRAM(S): 2.5 TABLET ORAL at 08:34

## 2022-05-15 RX ADMIN — Medication 1 SPRAY(S): at 06:31

## 2022-05-15 RX ADMIN — Medication 81 MILLIGRAM(S): at 08:36

## 2022-05-15 NOTE — DISCHARGE NOTE PROVIDER - PROVIDER TOKENS
PROVIDER:[TOKEN:[[id number]],FOLLOWUP:[4-6 Days]],PROVIDER:[TOKEN:[[id number]],FOLLOWUP:[4-6 Days]]

## 2022-05-15 NOTE — PROGRESS NOTE ADULT - ASSESSMENT
77 yo male w hx of Bladder Ca, hx of nephrolithaisis, , CKD 3 Cr 1.6- 1.7 in past, HTN, Obesity presenting with syncope witnessed with postural changes and oral diuretics/bp meds at home w poor po intake recenlty due to URI     ALBERT on CKD 3- prerenal /hypotension related  Cr 1.6- 1.7 in past  w poor po intake hx    Cr improving  - prerenal    Orthostatic negative here   - resume losartan half dose - 25 mg and continue to hold Chlorthalidone for now  revaluate need as outpatient and repeat labs    renal sono/bladder scan w hx of bladder cancer - neg for hydro or retention    ua bland w minimal protein      Syncope - postural effect /vagal ?    cardiology /medicine fup outpat     * pt seen earlier today     d/w Dr Mercer

## 2022-05-15 NOTE — DISCHARGE NOTE PROVIDER - HOSPITAL COURSE
75 y/o M PMHx significant for bladder CA current in remission x10 years, chronic back pain, morbid obesity, Hypertension, Hyperlipidemia, anxiety, depression, BPH, and neuropathy was BIBA from home s/p syncopal episode. Of note the patient was recently diagnosed with a upper respiratory infection and had associated generalized weakness, fatigue, productive cough, sinus congestion, and headaches. The patient has had 3 outpatient negative rapid COVID tests. Today the patient states he became acutely lightheaded prompting him to sit down against the wall, then synopsized. The patient's syncopal episode was witnessed by the patient's family. The episode lasted less than one minute. The patient denies any associated prodrome of chest pain, shortness of breath.   Labs => TnI (-) x 3, Cl 110, BUN/Cr 39/2.51. CT Head => No acute intracranial hemorrhage or mass effect. CXR => Clear lungs with no acute cardiopulmonary abnormalities. 2DECHO from 5/2021 =>  Estimated left ventricular ejection fraction is 55-60 %. The left ventricle is normal in size, wall thickness, wall motion and contractility as seen in limited views. The left atrium is moderately dilated. Mild to Moderate mitral regurgitation.  Pt was admitted for further evaluation.  Trops remained neg, Was monitored on tele: SR , no events.  ECG no acute changes. Pt was evaluated by cardio, likely syncope orthostatic vs vasovagal in settings of dehydration and ALBERT. Pt s/p IVF. Orthostatics neg. Pt was  also evaluated by Nephrologist for ALBERT, baseline CR 1.6-1.7.  Kidney sono done, no obstruction. L midpole cyst. Medical kidney Dz.  Pts meds reviewed, Chlorthalidone and  Losartan were hel on admission.   Today Pt reports feeling well, some congestion and cough but better with Mucinex. Meds, outPt f/u d/w Pt.     Vital Signs Last 24 Hrs  T(C): 37.1 (15 May 2022 08:34), Max: 37.6 (15 May 2022 00:31)  T(F): 98.7 (15 May 2022 08:34), Max: 99.7 (15 May 2022 00:31)  HR: 78 (15 May 2022 08:34) (74 - 78)  BP: 152/66 (15 May 2022 08:34) (122/54 - 152/66)  RR: 18 (15 May 2022 08:34) (18 - 18)  SpO2: 94% (15 May 2022 08:34) (93% - 95%)      PHYSICAL EXAM:  General: Well developed;  in no acute distress  Eyes: EOMI; conjunctiva and sclera clear  Head: Normocephalic; atraumatic  ENMT: No nasal discharge; airway clear  Neck: Supple; non tender; no masses  Respiratory: No wheezes, rales or rhonchi  Cardiovascular: Regular rate and rhythm. S1 and S2 Normal  Gastrointestinal: Soft non-tender non-distended; Normal bowel sounds  Genitourinary: No  suprapubic  tenderness  Extremities: No  edema  Vascular: Peripheral pulses palpable 2+ bilaterally  Neurological: Alert and oriented x3, non focal   Skin: Warm and dry. No acute rash  Musculoskeletal: Normal muscle tone, without deformities  Psychiatric: Cooperative and appropriate    A/P: 75 y/o M PMHx significant for bladder CA current in remission x10 years, chronic back pain, morbid obesity, Hypertension, Hyperlipidemia, anxiety, depression, BPH, and neuropathy admitted for:     # Syncope, likely vasovagal vs orthostatic, in settings of ALBERT, upper respiratory infection,   and dehydration   -Trops neg   - CT head, neg for acute findings  - CXR : no PNA or effusions   - CXR: SR, no acute changes, Q waves in inferior leads ( noted in the past)  -Orthostatics  neg after  IVF   -  tele: SR, no events   - fall precautions  - D/w Dr Ortiz, cleared for d/c Pt to f/u outPt with cardio ( DR Price)    #ALBERT on CKD3, prerenal   - baseline CR ~ 1.3-1.7.  CR  at 2.5 on admission, now  at baseline   - S/p IVF   - Chlorthalidone  and Losartan  - Kidney and Bladder sono: no obstruction, medical kidney Dz, Small cyst   - Bladder scan neg for retention   -D/w Dr Banuelos, cleared for d/c       #Hypertension  - Borderline BP on presentation, now improved   -Cont to hold Chlorthalidone, resume  Losartan at lower dose   -C/w  Amlodipine 10mg po daily and  Metoprolol ER 50mg po daily    #Hyperlipidemia  -C/w statin therapy with Atorvastatin 40mg po qhs  - takes  Ezetimibe 10mg po daily (non-formulary)    #Gout  - resume  Colchicine (takes 0.6mg po daily)  -C/w Allopurinol 100mg po daily    #BPH  C/w Tamsulosin 0.4mg po qhs    #Vte ppx  -C/w  Heparin sq

## 2022-05-15 NOTE — DISCHARGE NOTE PROVIDER - CARE PROVIDERS DIRECT ADDRESSES
[email or web address]

## 2022-05-15 NOTE — DISCHARGE NOTE NURSING/CASE MANAGEMENT/SOCIAL WORK - PATIENT PORTAL LINK FT
You can access the FollowMyHealth Patient Portal offered by [facility] by registering at the following website: [email or web address]. By joining [employer]’s FollowMyHealth portal, you will also be able to view your health information using other applications (apps) compatible with our system.

## 2022-05-15 NOTE — DISCHARGE NOTE PROVIDER - CARE PROVIDER_API CALL
Sunny Mariano (MD)  Family Medicine; Geriatric Medicine  [address] [place] [address]  Phone: [phone]  Fax: [phone]  Follow Up Time: 4-6 Days    Chantell Altman)  Adv Heart Fail Trnsplnt Cardio; Cardiovascular Disease  [address] [place] [address]  Phone: [phone]  Fax: [phone]  Follow Up Time: 4-6 Days

## 2022-05-15 NOTE — DISCHARGE NOTE PROVIDER - NSDCMRMEDTOKEN_GEN_ALL_CORE_FT
allopurinol 100 mg oral tablet: 1 tab(s) orally once a day    amLODIPine 10 mg oral tablet: 1 tab(s) orally once a day  aspirin 81 mg oral delayed release tablet: 1 tab(s) orally once a day--last dose 10/7/2021  atorvastatin 40 mg oral tablet: 1 tab(s) orally once a day  benzonatate 100 mg oral capsule: 1 cap(s) orally every 8 hours, As needed, Cough  Centrum Silver oral tablet: 1 tab(s) orally once a day  colchicine 0.6 mg oral tablet: 1 tab(s) orally once a day  ezetimibe 10 mg oral tablet: 1 tab(s) orally once a day  fluticasone 50 mcg/inh nasal spray: 2 spray(s) in each nostril once a day (in the evening)  guaiFENesin 1200 mg oral tablet, extended release: 1 tab(s) orally every 12 hours  losartan 50 mg oral tablet: 0.5 tab(s) orally once a day  Metoprolol Succinate ER 50 mg oral tablet, extended release: 1 tab(s) orally once a day  tamsulosin 0.4 mg oral capsule: 1 cap(s) orally once a day

## 2022-05-15 NOTE — DISCHARGE NOTE PROVIDER - NSDCCPCAREPLAN_GEN_ALL_CORE_FT
PRINCIPAL DISCHARGE DIAGNOSIS  Diagnosis: Syncope  Assessment and Plan of Treatment: likely related to dehydration   Do not take Chlorthalidone  Oral hydration   In case of  Dizziness or lightheadedness or palpitations return to ED   F/u with Cardio        SECONDARY DISCHARGE DIAGNOSES  Diagnosis: Acute renal failure  Assessment and Plan of Treatment: renal Function improved at at baseline  CR today 1.7  Continue to hold Chlorthalidone  F/u with PCP or renal Dr to repeat labs    Diagnosis: Renal cyst  Assessment and Plan of Treatment: F/u with Melodie Powell to monitor    Diagnosis: HTN (hypertension)  Assessment and Plan of Treatment: C/w metoprolol and amlodipine  Restart losartan 25mg daily  F/u with PCP to re check BP

## 2022-05-15 NOTE — PROGRESS NOTE ADULT - SUBJECTIVE AND OBJECTIVE BOX
77 y/o M PMHx significant for bladder CA current in remission x10 years, chronic back pain, morbid obesity, Hypertension, Hyperlipidemia, anxiety, depression, BPH, and neuropathy was BIBA from home s/p syncopal episode. Of note the patient was recently diagnosed with a upper respiratory infection and had associated generalized weakness, fatigue, productive cough, sinus congestion, and headaches. The patient has had 3 outpatient negative rapid COVID tests.   The patient states he became acutely lightheaded after working outside in the garage and pt was sweating profusley and not drinking water as he usually does. pt states he did feel light headed as he was bending down and standing up when working yesterday prompting him to sit down against the wall, then syncopized. The patient's syncopal episode was witnessed by the patient's family. The episode lasted less than one minute. The patient denies any associated prodrome of chest pain, shortness of breath.     Renal eval called for ALBERT on CKD 4 = Cr ~ 1.6- 1.7 in past - followed by Nephrologist in [place]     today feeling better , denies any symptoms      PAST MEDICAL & SURGICAL HISTORY:  HTN (hypertension)  HLD (hyperlipidemia)  Bladder cancerDx   Kidney stones  Neuropathyleft upper leg  Arthritis  Torn meniscusleft  Torn tendon  right biceps    Anxiety and depression    BPH (benign prostatic hyperplasia)    Bursitis of both hips  Morbid obesity  COVID-19 vaccine series completed  Completed 2021--Booster 2021  Mild mitral regurgitation  Previous back surgery  thoracic/lumbar x2---last  fusion with instrumentation  H/O cystoscopyTURB, BCG instillation x2 --, Bladder cancer  History of nephrolithotomy with removal of calculi Staghorn calculus left kidney--    History of knee surgery  Left knee arthroscopy with debridement, 10/15/2021    Home Medications:  allopurinol 100 mg oral tablet: 1 tab(s) orally once a day   (14 May 2022 00:24)  amLODIPine 10 mg oral tablet: 1 tab(s) orally once a day (14 May 2022 00:24)  aspirin 81 mg oral delayed release tablet: 1 tab(s) orally once a day--last dose 10/7/2021 (14 May 2022 00:24)  atorvastatin 40 mg oral tablet: 1 tab(s) orally once a day (14 May 2022 00:24)  Centrum Silver oral tablet: 1 tab(s) orally once a day (14 May 2022 00:24)  chlorthalidone 25 mg oral tablet: 1 tab(s) orally once a day (14 May 2022 00:24)  colchicine 0.6 mg oral tablet: 1 tab(s) orally once a day (14 May 2022 00:24)  ezetimibe 10 mg oral tablet: 1 tab(s) orally once a day (14 May 2022 00:24)  fluticasone 50 mcg/inh nasal spray: 2 spray(s) in each nostril once a day (in the evening) (14 May 2022 00:24)  losartan 50 mg oral tablet: 1 tab(s) orally once a day (14 May 2022 00:24)  Metoprolol Succinate ER 50 mg oral tablet, extended release: 1 tab(s) orally once a day (14 May 2022 00:24)  tamsulosin 0.4 mg oral capsule: 1 cap(s) orally once a day (14 May 2022 00:24)    MEDICATIONS  (STANDING):  allopurinol 100 milliGRAM(s) Oral daily  amLODIPine   Tablet 10 milliGRAM(s) Oral daily  aspirin enteric coated 81 milliGRAM(s) Oral daily  atorvastatin 40 milliGRAM(s) Oral at bedtime  ezetimibe 10 milliGRAM(s) Oral daily  fluticasone propionate 50 MICROgram(s)/spray Nasal Spray 1 Spray(s) Both Nostrils two times a day  guaiFENesin ER 1200 milliGRAM(s) Oral every 12 hours  heparin   Injectable 7500 Unit(s) SubCutaneous every 12 hours  metoprolol succinate ER 50 milliGRAM(s) Oral daily  sodium chloride 0.9%. 1000 milliLiter(s) (75 mL/Hr) IV Continuous <Continuous>  tamsulosin 0.4 milliGRAM(s) Oral at bedtime      Allergies    No Known Allergies    Intolerances        SOCIAL HISTORY:  Denies ETOh,Smoking,     FAMILY HISTORY:  FH: heart disease (Mother)    FHx: lung cancer (Father)        REVIEW OF SYSTEMS:    CONSTITUTIONAL: No weakness, fevers or chills  EYES/ENT: No visual changes;  No vertigo or throat pain   NECK: No pain or stiffness  RESPIRATORY: No cough, wheezing, hemoptysis; No shortness of breath  CARDIOVASCULAR: No chest pain or palpitations  GASTROINTESTINAL: No abdominal or epigastric pain. No nausea, vomiting, or hematemesis; No diarrhea or constipation. No melena or hematochezia.  GENITOURINARY: No dysuria, frequency or hematuria  NEUROLOGICAL: No numbness or weakness  SKIN: No itching, burning, rashes, or lesions   All other review of systems is negative unless indicated above.    VITAL:  Vital Signs Last 24 Hrs  T(C): 37.1 (15 May 2022 08:34), Max: 37.6 (15 May 2022 00:31)  T(F): 98.7 (15 May 2022 08:34), Max: 99.7 (15 May 2022 00:31)  HR: 78 (15 May 2022 08:34) (76 - 78)  BP: 152/66 (15 May 2022 08:34) (122/54 - 152/66)  BP(mean): --  RR: 18 (15 May 2022 08:34) (18 - 18)  SpO2: 94% (15 May 2022 08:34) (93% - 94%)    I&O's Detail    14 May 2022 07:01  -  15 May 2022 07:00  --------------------------------------------------------  IN:  Total IN: 0 mL    OUT:    Voided (mL): 700 mL  Total OUT: 700 mL    Total NET: -700 mL    Orthostatic VS    05-15-22 @ 15:53  Lying BP: 137/73 HR: 70   Sitting BP: 130/74 HR: 75  Standing BP: 120/59 HR: 78  Site: --   Mode: --    22 @ 06:24  Lying BP: 133/60 HR: 82   Sitting BP: 119/71 HR: 86  Standing BP: 137/65 HR: 87  Site: upper right arm   Mode: electronic      PHYSICAL EXAM:    Constitutional: NAD  HEENT:EOMI,  MMM  Neck: No LAD, No JVD  Respiratory: CTAB  Cardiovascular: S1 and S2  Gastrointestinal: BS+, soft, NT/ND  Extremities: No peripheral edema  Neurological: A/O x 3, no focal deficits  : No Galloway  Skin: No rashes  Access: Not applicable    LABS:               142    |  112    |  43     ----------------------------<  100       15 May 2022 08:30  3.9     |  22     |  1.71     139    |  109    |  45     ----------------------------<  102       14 May 2022 07:24  3.9     |  22     |  2.04     142    |  110    |  39     ----------------------------<  135       13 May 2022 15:38  4.0     |  24     |  2.51     Ca    8.9        15 May 2022 08:30  Ca    8.9        14 May 2022 07:24      Mg     2.5       13 May 2022 15:38    TPro  6.7    /  Alb  3.2    /  TBili  0.4    /        14 May 2022 07:24  DBili  x      /  AST  44     /  ALT  54     /  AlkPhos  70       TPro  7.4    /  Alb  3.6    /  TBili  0.4    /        13 May 2022 15:38  DBili  x      /  AST  51     /  ALT  64     /  AlkPhos  78           Mg     2.5       13 May 2022 15:38    TPro  6.7    /  Alb  3.2    /  TBili  0.4    /        14 May 2022 07:24  DBili  x      /  AST  44     /  ALT  54     /  AlkPhos  70       TPro  7.4    /  Alb  3.6    /  TBili  0.4    /        13 May 2022 15:38  DBili  x      /  AST  51     /  ALT  64     /  AlkPhos  78                      11.0   4.91  )-----------( 145      ( 14 May 2022 07:24 )             34.0         Ca    8.9      14 May 2022 07:24  Mg     2.5     05-    TPro  6.7  /  Alb  3.2<L>  /  TBili  0.4  /  DBili  x   /  AST  44<H>  /  ALT  54  /  AlkPhos  70  05-      Urine Studies:  Urinalysis Basic - ( 14 May 2022 06:00 )    Color: Yellow / Appearance: Clear / S.020 / pH: x  Gluc: x / Ketone: Negative  / Bili: Negative / Urobili: Negative   Blood: x / Protein: Trace mg/dL / Nitrite: Negative   Leuk Esterase: Negative / RBC: 0-2 /HPF / WBC 0-2   Sq Epi: x / Non Sq Epi: Few / Bacteria: Few          RADIOLOGY & ADDITIONAL STUDIES:

## 2022-05-15 NOTE — PROGRESS NOTE ADULT - SUBJECTIVE AND OBJECTIVE BOX
Patient is a 76y old  Male who presents with a chief complaint of Syncope (14 May 2022 18:49)    22- Cardiology Consultation: 76 year old man presented after a self limited syncopal episode. He had an acute respiratory infection with malaise,  cough, wheeze and was treated about 1 week ago at urgent care clinic with tessalon and a nasal spray. His appetite became poor and he was not eating or drinking normally  for several  days. 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 He sa to rest then got up and felt like he would pass out so he sat down again and did lose consciousness for about 30 seconds. He fell back and struck a tree but did not sustain any injury. He denies chest pain or palpitation. In  he sustained a syncopal episode after a large meal and hospital evaluation was negative for arrhythmia.No prior history of LV dysfunction, MI, arrhythmia. Stress test with Dr. Brand in the past was normal.      Followup HPI:  5/15- Cough is ongoing without expectoration, fever or shortness of breath. No dizziness or light headedness.    PAST MEDICAL & SURGICAL HISTORY:  HTN (hypertension)      HLD (hyperlipidemia)      Bladder cancer  Dx       Kidney stones      Neuropathy  left upper leg      Arthritis      Torn meniscus  left      Torn tendon  right biceps      Anxiety and depression      BPH (benign prostatic hyperplasia)      Bursitis of both hips      Morbid obesity      COVID-19 vaccine series completed  Completed 2021--Booster 2021      Mild mitral regurgitation      Previous back surgery  thoracic/lumbar x2---last  fusion with instrumentation      H/O shoulder surgery  right shoulder tendon repair-      H/O knee surgery  arthroscopy does not recall which knee 15 yrs ago      H/O cystoscopy  TURB, BCG instillation x2 --, Bladder cancer      History of nephrolithotomy with removal of calculi  Staghorn calculus left kidney--      History of knee surgery  Left knee arthroscopy with debridement, 10/15/2021          Review of symptoms:  Negative except for as noted in today's HPI.      MEDICATIONS  (STANDING):  allopurinol 100 milliGRAM(s) Oral daily  amLODIPine   Tablet 10 milliGRAM(s) Oral daily  aspirin enteric coated 81 milliGRAM(s) Oral daily  atorvastatin 40 milliGRAM(s) Oral at bedtime  ezetimibe 10 milliGRAM(s) Oral daily  fluticasone propionate 50 MICROgram(s)/spray Nasal Spray 1 Spray(s) Both Nostrils two times a day  guaiFENesin ER 1200 milliGRAM(s) Oral every 12 hours  heparin   Injectable 7500 Unit(s) SubCutaneous every 12 hours  metoprolol succinate ER 50 milliGRAM(s) Oral daily  sodium chloride 0.9%. 1000 milliLiter(s) (75 mL/Hr) IV Continuous <Continuous>  tamsulosin 0.4 milliGRAM(s) Oral at bedtime    MEDICATIONS  (PRN):  acetaminophen     Tablet .. 650 milliGRAM(s) Oral every 6 hours PRN Temp greater or equal to 38C (100.4F), Mild Pain (1 - 3)  aluminum hydroxide/magnesium hydroxide/simethicone Suspension 30 milliLiter(s) Oral every 4 hours PRN Dyspepsia  benzonatate 100 milliGRAM(s) Oral every 8 hours PRN Cough  melatonin 3 milliGRAM(s) Oral at bedtime PRN Insomnia  ondansetron Injectable 4 milliGRAM(s) IV Push every 8 hours PRN Nausea and/or Vomiting          Vital Signs Last 24 Hrs  T(C): 37.2 (15 May 2022 05:00), Max: 37.6 (15 May 2022 00:31)  T(F): 99 (15 May 2022 05:00), Max: 99.7 (15 May 2022 00:31)  HR: 76 (15 May 2022 00:31) (74 - 76)  BP: 122/54 (15 May 2022 00:31) (122/54 - 158/73)  BP(mean): --  RR: 18 (15 May 2022 00:31) (18 - 19)  SpO2: 93% (15 May 2022 00:31) (93% - 98%)    I&O's Summary    14 May 2022 07:01  -  15 May 2022 07:00  --------------------------------------------------------  IN: 0 mL / OUT: 700 mL / NET: -700 mL        PHYSICAL EXAM  General Appearance: comfortable  HEENT:   Neck:   Lungs: clear  Heart: no murmur  Abdomen: soft and nontender  Extremities: no edema  Neurologic:       INTERPRETATION OF TELEMETRY: RSR    ECG:    LABS:                          11.0   4.91  )-----------( 145      ( 14 May 2022 07:24 )             34.0     05-14    139  |  109<H>  |  45<H>  ----------------------------<  102<H>  3.9   |  22  |  2.04<H>    Ca    8.9      14 May 2022 07:24  Mg     2.5     05-    TPro  6.7  /  Alb  3.2<L>  /  TBili  0.4  /  DBili  x   /  AST  44<H>  /  ALT  54  /  AlkPhos  70  -            PT/INR - ( 13 May 2022 15:38 )   PT: 12.6 sec;   INR: 1.09 ratio         PTT - ( 13 May 2022 15:38 )  PTT:28.2 sec  Urinalysis Basic - ( 14 May 2022 06:00 )    Color: Yellow / Appearance: Clear / S.020 / pH: x  Gluc: x / Ketone: Negative  / Bili: Negative / Urobili: Negative   Blood: x / Protein: Trace mg/dL / Nitrite: Negative   Leuk Esterase: Negative / RBC: 0-2 /HPF / WBC 0-2   Sq Epi: x / Non Sq Epi: Few / Bacteria: Few            RADIOLOGY & ADDITIONAL STUDIES:    IMPRESSION:  1. Syncope most likely due to orthostasis due to poor oral intake for a few days on top of taking antihypertensive medication. He appears stable at present.  2. Abnormal EKG  but unchanged from . Inferior Q waves are old and he is without known clinical MI.  PLAN:  No additional cardiology work up is advised. Continue amlodipine, Zetia, atorvastatin. Will no longer follow.

## 2022-05-16 ENCOUNTER — NON-APPOINTMENT (OUTPATIENT)
Age: 77
End: 2022-05-16

## 2022-05-17 LAB — SARS-COV-2 N GENE NPH QL NAA+PROBE: NOT DETECTED

## 2022-05-20 ENCOUNTER — APPOINTMENT (OUTPATIENT)
Age: 77
End: 2022-05-20

## 2022-05-20 ENCOUNTER — OUTPATIENT (OUTPATIENT)
Dept: OUTPATIENT SERVICES | Facility: HOSPITAL | Age: 77
LOS: 1 days | End: 2022-05-20
Payer: MEDICARE

## 2022-05-20 DIAGNOSIS — R05.3 CHRONIC COUGH: ICD-10-CM

## 2022-05-20 DIAGNOSIS — Z98.890 OTHER SPECIFIED POSTPROCEDURAL STATES: Chronic | ICD-10-CM

## 2022-05-20 PROCEDURE — 71046 X-RAY EXAM CHEST 2 VIEWS: CPT | Mod: 26

## 2022-05-20 PROCEDURE — 71046 X-RAY EXAM CHEST 2 VIEWS: CPT

## 2022-05-21 ENCOUNTER — RX RENEWAL (OUTPATIENT)
Age: 77
End: 2022-05-21

## 2022-05-21 DIAGNOSIS — I34.0 NONRHEUMATIC MITRAL (VALVE) INSUFFICIENCY: ICD-10-CM

## 2022-05-21 DIAGNOSIS — Z85.51 PERSONAL HISTORY OF MALIGNANT NEOPLASM OF BLADDER: ICD-10-CM

## 2022-05-21 DIAGNOSIS — E66.01 MORBID (SEVERE) OBESITY DUE TO EXCESS CALORIES: ICD-10-CM

## 2022-05-21 DIAGNOSIS — I12.9 HYPERTENSIVE CHRONIC KIDNEY DISEASE WITH STAGE 1 THROUGH STAGE 4 CHRONIC KIDNEY DISEASE, OR UNSPECIFIED CHRONIC KIDNEY DISEASE: ICD-10-CM

## 2022-05-21 DIAGNOSIS — G89.29 OTHER CHRONIC PAIN: ICD-10-CM

## 2022-05-21 DIAGNOSIS — R94.31 ABNORMAL ELECTROCARDIOGRAM [ECG] [EKG]: ICD-10-CM

## 2022-05-21 DIAGNOSIS — E78.5 HYPERLIPIDEMIA, UNSPECIFIED: ICD-10-CM

## 2022-05-21 DIAGNOSIS — N40.0 BENIGN PROSTATIC HYPERPLASIA WITHOUT LOWER URINARY TRACT SYMPTOMS: ICD-10-CM

## 2022-05-21 DIAGNOSIS — G62.9 POLYNEUROPATHY, UNSPECIFIED: ICD-10-CM

## 2022-05-21 DIAGNOSIS — Z20.822 CONTACT WITH AND (SUSPECTED) EXPOSURE TO COVID-19: ICD-10-CM

## 2022-05-21 DIAGNOSIS — F41.9 ANXIETY DISORDER, UNSPECIFIED: ICD-10-CM

## 2022-05-21 DIAGNOSIS — N28.1 CYST OF KIDNEY, ACQUIRED: ICD-10-CM

## 2022-05-21 DIAGNOSIS — Z87.442 PERSONAL HISTORY OF URINARY CALCULI: ICD-10-CM

## 2022-05-21 DIAGNOSIS — J06.9 ACUTE UPPER RESPIRATORY INFECTION, UNSPECIFIED: ICD-10-CM

## 2022-05-21 DIAGNOSIS — Z79.899 OTHER LONG TERM (CURRENT) DRUG THERAPY: ICD-10-CM

## 2022-05-21 DIAGNOSIS — N18.30 CHRONIC KIDNEY DISEASE, STAGE 3 UNSPECIFIED: ICD-10-CM

## 2022-05-21 DIAGNOSIS — Z79.82 LONG TERM (CURRENT) USE OF ASPIRIN: ICD-10-CM

## 2022-05-21 DIAGNOSIS — M10.9 GOUT, UNSPECIFIED: ICD-10-CM

## 2022-05-21 DIAGNOSIS — N17.9 ACUTE KIDNEY FAILURE, UNSPECIFIED: ICD-10-CM

## 2022-05-21 DIAGNOSIS — E86.0 DEHYDRATION: ICD-10-CM

## 2022-05-21 DIAGNOSIS — I95.9 HYPOTENSION, UNSPECIFIED: ICD-10-CM

## 2022-05-21 DIAGNOSIS — F32.A DEPRESSION, UNSPECIFIED: ICD-10-CM

## 2022-05-21 DIAGNOSIS — R55 SYNCOPE AND COLLAPSE: ICD-10-CM

## 2022-05-23 RX ORDER — LOSARTAN POTASSIUM 100 MG/1
1 TABLET, FILM COATED ORAL
Qty: 0 | Refills: 0 | DISCHARGE

## 2022-05-24 DIAGNOSIS — Z98.1 ARTHRODESIS STATUS: ICD-10-CM

## 2022-05-24 DIAGNOSIS — Z98.890 OTHER SPECIFIED POSTPROCEDURAL STATES: ICD-10-CM

## 2022-07-06 LAB — SARS-COV-2 N GENE NPH QL NAA+PROBE: NOT DETECTED

## 2022-07-10 ENCOUNTER — RX RENEWAL (OUTPATIENT)
Age: 77
End: 2022-07-10

## 2022-07-13 ENCOUNTER — NON-APPOINTMENT (OUTPATIENT)
Age: 77
End: 2022-07-13

## 2022-07-13 PROBLEM — I34.0 NONRHEUMATIC MITRAL (VALVE) INSUFFICIENCY: Chronic | Status: ACTIVE | Noted: 2022-05-14

## 2022-07-15 ENCOUNTER — APPOINTMENT (OUTPATIENT)
Dept: ORTHOPEDIC SURGERY | Facility: CLINIC | Age: 77
End: 2022-07-15

## 2022-07-15 PROCEDURE — 99213 OFFICE O/P EST LOW 20 MIN: CPT

## 2022-07-15 PROCEDURE — 73560 X-RAY EXAM OF KNEE 1 OR 2: CPT | Mod: RT

## 2022-07-18 ENCOUNTER — RX RENEWAL (OUTPATIENT)
Age: 77
End: 2022-07-18

## 2022-07-19 ENCOUNTER — NON-APPOINTMENT (OUTPATIENT)
Age: 77
End: 2022-07-19

## 2022-07-19 NOTE — DISCUSSION/SUMMARY
[de-identified] : At this time, due to gout of the right knee, the patient is going to go to his primary care doctor.  He also states he has a history of gout and does have cholchicine and he will start it.  The patient will return here as needed.\par

## 2022-07-19 NOTE — PHYSICAL EXAM
[de-identified] : Right Knee: Range of Motion in Degrees\par 	\par 	                  Claimant:    Normal:	\par Flexion Active	    135 	    135-degrees	\par Flexion Passive	    135	    135-degrees	\par Extension Active	    0-5	    0-5-degrees	\par Extension Passive	    0-5	    0-5-degrees	\par \par The knee is very hot, tender and red.  \par  [de-identified] : Gait and Station:  Ambulating with a slightly antalgic to antalgic gait.  Normal Station.  [de-identified] : Appearance:  Well developed, well-nourished male in no acute distress.\par   [de-identified] : Radiographs, one to two views of the right knee taken in the office today, reveal osteoarthritis.\par  liquor/beer/wine

## 2022-07-19 NOTE — ADDENDUM
[FreeTextEntry1] : This note was written by Andreas Cavanaugh on 07/19/2022, acting as a scribe for DONTE LOPEZ, DAVID/L, PA

## 2022-07-19 NOTE — HISTORY OF PRESENT ILLNESS
[de-identified] : The patient comes in today with complaints of right knee pain.  The patient states he had a "big, huge seafood platter the other night".\par

## 2022-08-01 RX ORDER — METHYLPREDNISOLONE 4 MG/1
4 TABLET ORAL
Qty: 1 | Refills: 0 | Status: DISCONTINUED | COMMUNITY
Start: 2022-05-20 | End: 2022-08-01

## 2022-08-01 RX ORDER — OXYCODONE AND ACETAMINOPHEN 5; 325 MG/1; MG/1
5-325 TABLET ORAL
Qty: 30 | Refills: 0 | Status: DISCONTINUED | COMMUNITY
Start: 2021-10-12 | End: 2022-08-01

## 2022-08-04 ENCOUNTER — RX RENEWAL (OUTPATIENT)
Age: 77
End: 2022-08-04

## 2022-08-11 ENCOUNTER — NON-APPOINTMENT (OUTPATIENT)
Age: 77
End: 2022-08-11

## 2022-08-12 ENCOUNTER — APPOINTMENT (OUTPATIENT)
Dept: CARDIOLOGY | Facility: CLINIC | Age: 77
End: 2022-08-12

## 2022-08-12 ENCOUNTER — NON-APPOINTMENT (OUTPATIENT)
Age: 77
End: 2022-08-12

## 2022-08-12 VITALS
HEIGHT: 68 IN | OXYGEN SATURATION: 96 % | DIASTOLIC BLOOD PRESSURE: 68 MMHG | SYSTOLIC BLOOD PRESSURE: 148 MMHG | HEART RATE: 76 BPM | BODY MASS INDEX: 41.22 KG/M2 | WEIGHT: 272 LBS

## 2022-08-12 PROCEDURE — 99204 OFFICE O/P NEW MOD 45 MIN: CPT

## 2022-08-12 PROCEDURE — 93000 ELECTROCARDIOGRAM COMPLETE: CPT

## 2022-08-12 RX ORDER — SERTRALINE HYDROCHLORIDE 50 MG/1
50 TABLET, FILM COATED ORAL
Qty: 90 | Refills: 2 | Status: DISCONTINUED | COMMUNITY
Start: 2019-11-13 | End: 2022-08-12

## 2022-08-12 NOTE — HISTORY OF PRESENT ILLNESS
[FreeTextEntry1] : 75 yo male presents for evaluation of exertional dyspnea which began appx 6 months ago. Pt denies chest pain. He has a recent history of being admitted to  after a syncopal episode. Pt had an echo at that time revealing normal EF and mild to moderate MR. He was seen by another cardiologist locally who performed an nuclear stress test which was reportedly non ischemic. H/O CKD, HTN, HLD and multiple arthritic conditions which limits his ability to ambulatory.

## 2022-08-12 NOTE — DISCUSSION/SUMMARY
[Hyperlipidemia] : hyperlipidemia [Hypertension] : hypertension [Stable] : stable [Patient] : the patient [FreeTextEntry1] : Problematic symptoms in light of recent admission for syncope. Pt's lifestyle is limited and he is frustrated. Testing was reviewed with patient. Echo and ETT were not revealing. CKD increases the risk for any contrast procedure. Will discuss with interventional cardiologist and PMD. Repeat Echo to evaluate MR.  [EKG obtained to assist in diagnosis and management of assessed problem(s)] : EKG obtained to assist in diagnosis and management of assessed problem(s)

## 2022-08-23 LAB
ANION GAP SERPL CALC-SCNC: 13 MMOL/L
APTT BLD: 29.9 SEC
BASOPHILS # BLD AUTO: 0.02 K/UL
BASOPHILS NFR BLD AUTO: 0.3 %
BUN SERPL-MCNC: 27 MG/DL
CALCIUM SERPL-MCNC: 9.6 MG/DL
CHLORIDE SERPL-SCNC: 114 MMOL/L
CO2 SERPL-SCNC: 20 MMOL/L
CREAT SERPL-MCNC: 1.6 MG/DL
EGFR: 44 ML/MIN/1.73M2
EOSINOPHIL # BLD AUTO: 0.17 K/UL
EOSINOPHIL NFR BLD AUTO: 2.3 %
GLUCOSE SERPL-MCNC: 119 MG/DL
HCT VFR BLD CALC: 36.9 %
HGB BLD-MCNC: 11.4 G/DL
IMM GRANULOCYTES NFR BLD AUTO: 0.3 %
INR PPP: 0.99 RATIO
LYMPHOCYTES # BLD AUTO: 1.88 K/UL
LYMPHOCYTES NFR BLD AUTO: 25.7 %
MAN DIFF?: NORMAL
MCHC RBC-ENTMCNC: 29.5 PG
MCHC RBC-ENTMCNC: 30.9 GM/DL
MCV RBC AUTO: 95.3 FL
MONOCYTES # BLD AUTO: 0.69 K/UL
MONOCYTES NFR BLD AUTO: 9.4 %
NEUTROPHILS # BLD AUTO: 4.54 K/UL
NEUTROPHILS NFR BLD AUTO: 62 %
PLATELET # BLD AUTO: 215 K/UL
POTASSIUM SERPL-SCNC: 5.5 MMOL/L
PT BLD: 11.6 SEC
RBC # BLD: 3.87 M/UL
RBC # FLD: 14.4 %
SARS-COV-2 N GENE NPH QL NAA+PROBE: NOT DETECTED
SODIUM SERPL-SCNC: 147 MMOL/L
WBC # FLD AUTO: 7.32 K/UL

## 2022-08-23 NOTE — ASU PATIENT PROFILE, ADULT - NSICDXPASTSURGICALHX_GEN_ALL_CORE_FT
PAST SURGICAL HISTORY:  H/O cystoscopy TURB, BCG instillation x2 --2013, Bladder cancer    H/O knee surgery arthroscopy does not recall which knee 15 yrs ago    H/O shoulder surgery right shoulder tendon repair-2014    History of knee surgery Left knee arthroscopy with debridement, 10/15/2021    History of nephrolithotomy with removal of calculi Staghorn calculus left kidney--2010    Previous back surgery thoracic/lumbar x2---last 2011 fusion with instrumentation

## 2022-08-23 NOTE — H&P ADULT - ASSESSMENT
Patient presents now for an angiogram with possible intervention.  - risk/ benefits discussed  - informed consent obtained          ASA- II  creat- 1.60  GFR-44  BR-3.0%

## 2022-08-23 NOTE — ASU PATIENT PROFILE, ADULT - NSICDXPASTMEDICALHX_GEN_ALL_CORE_FT
PAST MEDICAL HISTORY:  Anxiety and depression     Arthritis     Bladder cancer Dx 2013    BPH (benign prostatic hyperplasia)     Bursitis of both hips     COVID-19 vaccine series completed Completed 2/2021--Booster 9/2/2021    HLD (hyperlipidemia)     HTN (hypertension)     Kidney stones     Mild mitral regurgitation     Morbid obesity     Neuropathy left upper leg    Torn meniscus left    Torn tendon right biceps

## 2022-08-23 NOTE — ASU PATIENT PROFILE, ADULT - FALL HARM RISK - HARM RISK INTERVENTIONS
Assistance with ambulation/Assistance OOB with selected safe patient handling equipment/Communicate Risk of Fall with Harm to all staff/Reinforce activity limits and safety measures with patient and family/Review medications for side effects contributing to fall risk/Sit up slowly, dangle for a short time, stand at bedside before walking/Tailored Fall Risk Interventions/Toileting schedule using arm’s reach rule for commode and bathroom/Visual Cue: Yellow wristband and red socks/Bed in lowest position, wheels locked, appropriate side rails in place/Call bell, personal items and telephone in reach/Instruct patient to call for assistance before getting out of bed or chair/Non-slip footwear when patient is out of bed/Collins to call system/Physically safe environment - no spills, clutter or unnecessary equipment/Purposeful Proactive Rounding/Room/bathroom lighting operational, light cord in reach

## 2022-08-23 NOTE — H&P ADULT - HISTORY OF PRESENT ILLNESS
76yr old male PMH HTN, HLD, CKD, YO, with c/o exertional dyspnea   This is a 76yr old male PMH HTN, HLD, CKD, YO, with c/o worsening  exertional dyspnea.

## 2022-08-24 ENCOUNTER — OUTPATIENT (OUTPATIENT)
Dept: INPATIENT UNIT | Facility: HOSPITAL | Age: 77
LOS: 1 days | Discharge: HOME | End: 2022-08-24
Payer: MEDICARE

## 2022-08-24 VITALS
OXYGEN SATURATION: 97 % | TEMPERATURE: 98 F | WEIGHT: 270.07 LBS | RESPIRATION RATE: 18 BRPM | SYSTOLIC BLOOD PRESSURE: 147 MMHG | DIASTOLIC BLOOD PRESSURE: 65 MMHG | HEART RATE: 80 BPM | HEIGHT: 68 IN

## 2022-08-24 VITALS
DIASTOLIC BLOOD PRESSURE: 56 MMHG | RESPIRATION RATE: 19 BRPM | OXYGEN SATURATION: 95 % | SYSTOLIC BLOOD PRESSURE: 118 MMHG | HEART RATE: 70 BPM

## 2022-08-24 DIAGNOSIS — Z98.890 OTHER SPECIFIED POSTPROCEDURAL STATES: Chronic | ICD-10-CM

## 2022-08-24 DIAGNOSIS — R06.09 OTHER FORMS OF DYSPNEA: ICD-10-CM

## 2022-08-24 DIAGNOSIS — I25.10 ATHEROSCLEROTIC HEART DISEASE OF NATIVE CORONARY ARTERY WITHOUT ANGINA PECTORIS: ICD-10-CM

## 2022-08-24 DIAGNOSIS — R07.89 OTHER CHEST PAIN: ICD-10-CM

## 2022-08-24 LAB
ANION GAP SERPL CALC-SCNC: 4 MMOL/L — LOW (ref 5–17)
BUN SERPL-MCNC: 28 MG/DL — HIGH (ref 7–23)
CALCIUM SERPL-MCNC: 9.9 MG/DL — SIGNIFICANT CHANGE UP (ref 8.5–10.1)
CHLORIDE SERPL-SCNC: 113 MMOL/L — HIGH (ref 96–108)
CO2 SERPL-SCNC: 25 MMOL/L — SIGNIFICANT CHANGE UP (ref 22–31)
CREAT SERPL-MCNC: 1.58 MG/DL — HIGH (ref 0.5–1.3)
EGFR: 45 ML/MIN/1.73M2 — LOW
GLUCOSE SERPL-MCNC: 96 MG/DL — SIGNIFICANT CHANGE UP (ref 70–99)
POTASSIUM SERPL-MCNC: 5.3 MMOL/L — SIGNIFICANT CHANGE UP (ref 3.5–5.3)
POTASSIUM SERPL-SCNC: 5.3 MMOL/L — SIGNIFICANT CHANGE UP (ref 3.5–5.3)
SODIUM SERPL-SCNC: 142 MMOL/L — SIGNIFICANT CHANGE UP (ref 135–145)

## 2022-08-24 PROCEDURE — 80048 BASIC METABOLIC PNL TOTAL CA: CPT

## 2022-08-24 PROCEDURE — C1769: CPT

## 2022-08-24 PROCEDURE — 36415 COLL VENOUS BLD VENIPUNCTURE: CPT

## 2022-08-24 PROCEDURE — C1894: CPT

## 2022-08-24 PROCEDURE — 93460 R&L HRT ART/VENTRICLE ANGIO: CPT

## 2022-08-24 PROCEDURE — 93460 R&L HRT ART/VENTRICLE ANGIO: CPT | Mod: 26

## 2022-08-24 PROCEDURE — C1887: CPT

## 2022-08-24 RX ORDER — SODIUM CHLORIDE 9 MG/ML
1000 INJECTION INTRAMUSCULAR; INTRAVENOUS; SUBCUTANEOUS
Refills: 0 | Status: DISCONTINUED | OUTPATIENT
Start: 2022-08-24 | End: 2022-08-24

## 2022-08-24 RX ORDER — FUROSEMIDE 40 MG
20 TABLET ORAL ONCE
Refills: 0 | Status: COMPLETED | OUTPATIENT
Start: 2022-08-24 | End: 2022-08-24

## 2022-08-24 RX ADMIN — SODIUM CHLORIDE 250 MILLILITER(S): 9 INJECTION INTRAMUSCULAR; INTRAVENOUS; SUBCUTANEOUS at 12:40

## 2022-08-24 RX ADMIN — Medication 20 MILLIGRAM(S): at 14:48

## 2022-08-24 NOTE — PACU DISCHARGE NOTE - COMMENTS
Patient s/p RHC/LHC via Right brachial artery and right radial artery. RUE warm and mobile. No s/s of bleeding or hematoma. RUE warm and mobile. Pressure dressing in place to Right radial artery and gauze and coband to right brachial artery. VS Stable. Patient denies pain. Sl removed for discharge home.  Discharge instructions reviewed with patient and patient verbalizes understanding. Pateint pending transport to [facility] for discharge home at this time

## 2022-08-24 NOTE — PROGRESS NOTE ADULT - ASSESSMENT
Patient now s/p Right and left heart cath that revealed nonobstructive disease with elevated right heart pressures , wedge of 20.  - medical management, Lasix 20mg given IVP  - DC home  - f/u with Dr Vega 1-2 weeks

## 2022-08-24 NOTE — PROGRESS NOTE ADULT - SUBJECTIVE AND OBJECTIVE BOX
Cardiology NP     Patient is a 76y old  Male who presents with a chief complaint of  worsening SOB on exertion. (23 Aug 2022 16:08)      HPI:   This is a 76yr old male PMH HTN, HLD, CKD, YO, with c/o worsening  exertional dyspnea. (23 Aug 2022 16:08)      PAST MEDICAL & SURGICAL HISTORY:  HTN (hypertension)    HLD (hyperlipidemia)    Bladder cancer  Dx 2013    Kidney stones    Neuropathy  left upper leg    Arthritis    Torn meniscus  left    Torn tendon  right biceps    Anxiety and depression    BPH (benign prostatic hyperplasia)    H/O nephrolithotomy with removal of calculi  Staghorn left kidney--2010    Bursitis of both hips    Morbid obesity    COVID-19 vaccine series completed  Completed 2/2021--Booster 9/2/2021    Mild mitral regurgitation    No significant past surgical history    Previous back surgery  thoracic/lumbar x2---last 2011 fusion with instrumentation    H/O shoulder surgery  right shoulder tendon repair-2014    H/O knee surgery  arthroscopy does not recall which knee 15 yrs ago    H/O cystoscopy  TURB, BCG instillation x2 --2013, Bladder cancer    History of nephrolithotomy with removal of calculi  Staghorn calculus left kidney--2010    History of knee surgery  Left knee arthroscopy with debridement, 10/15/2021        MEDICATIONS  (STANDING):  furosemide   Injectable 20 milliGRAM(s) IV Push once  sodium chloride 0.9%. 1000 milliLiter(s) (250 mL/Hr) IV Continuous <Continuous>    MEDICATIONS  (PRN):      Allergies    No Known Allergies    Intolerances        REVIEW OF SYSTEMS: As mentioned in HPI all others Negative     Vital Signs Last 24 Hrs  T(C): 36.8 (24 Aug 2022 12:05), Max: 36.8 (24 Aug 2022 12:05)  T(F): 98.2 (24 Aug 2022 12:05), Max: 98.2 (24 Aug 2022 12:05)  HR: 80 (24 Aug 2022 12:05) (80 - 80)  BP: 147/65 (24 Aug 2022 12:05) (147/65 - 147/65)  BP(mean): --  RR: 18 (24 Aug 2022 12:05) (18 - 18)  SpO2: 97% (24 Aug 2022 12:05) (97% - 97%)    Parameters below as of 24 Aug 2022 12:05  Patient On (Oxygen Delivery Method): room air        PHYSICAL EXAM:  NERVOUS SYSTEM:  Alert & Oriented X3, Good concentration  CHEST/LUNG: Clear to auscultation bilaterally; No rales, rhonchi, wheezing, or rubs  HEART: Regular rate and rhythm; No murmurs, rubs, or gallops  ABDOMEN: Soft, Nontender, Nondistended; Bowel sounds present  EXTREMITIES:  2+ Peripheral Pulses, No clubbing, cyanosis, or edema  SKIN: right radial cath site without bleeding or hematoma    LABS:    08-24    142  |  113<H>  |  28<H>  ----------------------------<  96  5.3   |  25  |  1.58<H>    Ca    9.9      24 Aug 2022 12:15

## 2022-08-24 NOTE — ASU PREOP CHECKLIST - AS BP NONINV SITE
Date of Service: 10/24/2019    SUBJECTIVE:  The patient returns.  I saw her just under 3 years ago actually for her contralateral right knee.  She is here for her left knee.  She has had pain for a couple months after she kneeled down while she was at work on some concrete.  She has had medial pain, swelling and almost some locking in the knees, she describes it.  Never had a problem with this knee before.  She does have hip arthritis, has had her contralateral right hip replaced.  She has pain with walking and again her pain is medial.    PAST MEDICAL HISTORY:  Negative for any gout or rheumatoid arthritis.  She has a history of smoking.      ALLERGIES:  She has a number of medications that she is allergic to including Hydrocodone, Tylenol, Prednisone and Aspirin.    MEDICATIONS:  She does take:   Valium.  Lyrica.    SOCIAL HISTORY:  She does smoke.    PHYSICAL EXAMINATION:  GENERAL:  She is alert and oriented to person, place and time.  She has appropriate mood and affect.  She is in no acute distress.  VITAL SIGNS:  She is 5 feet 7, 54 kg, respirations 18.  EXTREMITIES:  The knee shows medial joint line tenderness.  There is an effusion.  She has brisk cap refill distally.  Normal sensation to foot.  Skin is intact.  Knee is otherwise stable.  Positive Sonny sign.    X-rays are reviewed which show good maintenance of her joint spaces.  It does look like from previous hip x-ray, she does have fairly significant hip arthritis on the left side as well.    ASSESSMENT:  A patient with medial knee pain for 2 months.  She has some locking, swelling.    RECOMMENDATION:  We are going to go ahead and get an MRI to rule out internal derangement or a meniscus tear.  Again, she does have hip arthritis on that side.  I think that referred pain from the hip is also possible, but it does seem to be coming from her knee.  I will see her back after the MRI.      Dictated By: Noe Daniel MD  Signing Provider: Noe Daniel,  MD SULLIVAN/adrian ([id number])  DD: 10/24/2019 10:30:03 TD: 10/25/2019 09:24:00    Copy Sent To:    right upper arm

## 2022-09-01 ENCOUNTER — APPOINTMENT (OUTPATIENT)
Dept: CARDIOLOGY | Facility: CLINIC | Age: 77
End: 2022-09-01

## 2022-09-01 PROCEDURE — 93306 TTE W/DOPPLER COMPLETE: CPT

## 2022-09-20 ENCOUNTER — NON-APPOINTMENT (OUTPATIENT)
Age: 77
End: 2022-09-20

## 2022-09-23 ENCOUNTER — APPOINTMENT (OUTPATIENT)
Dept: INTERNAL MEDICINE | Facility: CLINIC | Age: 77
End: 2022-09-23

## 2022-09-23 ENCOUNTER — APPOINTMENT (OUTPATIENT)
Dept: CARDIOLOGY | Facility: CLINIC | Age: 77
End: 2022-09-23

## 2022-09-23 VITALS
BODY MASS INDEX: 40.16 KG/M2 | HEART RATE: 77 BPM | DIASTOLIC BLOOD PRESSURE: 54 MMHG | WEIGHT: 265 LBS | OXYGEN SATURATION: 94 % | HEIGHT: 68 IN | SYSTOLIC BLOOD PRESSURE: 110 MMHG

## 2022-09-23 VITALS
SYSTOLIC BLOOD PRESSURE: 155 MMHG | OXYGEN SATURATION: 95 % | RESPIRATION RATE: 18 BRPM | DIASTOLIC BLOOD PRESSURE: 71 MMHG | HEIGHT: 68 IN | BODY MASS INDEX: 40.01 KG/M2 | HEART RATE: 73 BPM | WEIGHT: 264 LBS | TEMPERATURE: 98.9 F

## 2022-09-23 DIAGNOSIS — Z87.891 PERSONAL HISTORY OF NICOTINE DEPENDENCE: ICD-10-CM

## 2022-09-23 DIAGNOSIS — J98.4 OTHER DISORDERS OF LUNG: ICD-10-CM

## 2022-09-23 DIAGNOSIS — E78.2 MIXED HYPERLIPIDEMIA: ICD-10-CM

## 2022-09-23 PROCEDURE — 94060 EVALUATION OF WHEEZING: CPT

## 2022-09-23 PROCEDURE — G0447 BEHAVIOR COUNSEL OBESITY 15M: CPT | Mod: 59

## 2022-09-23 PROCEDURE — 99204 OFFICE O/P NEW MOD 45 MIN: CPT | Mod: 25

## 2022-09-23 PROCEDURE — 94729 DIFFUSING CAPACITY: CPT

## 2022-09-23 PROCEDURE — 99214 OFFICE O/P EST MOD 30 MIN: CPT

## 2022-09-23 PROCEDURE — 94727 GAS DIL/WSHOT DETER LNG VOL: CPT

## 2022-09-23 PROCEDURE — ZZZZZ: CPT

## 2022-09-23 PROCEDURE — 93000 ELECTROCARDIOGRAM COMPLETE: CPT

## 2022-09-23 RX ORDER — MIRABEGRON 50 MG/1
50 TABLET, FILM COATED, EXTENDED RELEASE ORAL
Qty: 30 | Refills: 0 | Status: DISCONTINUED | COMMUNITY
Start: 2022-06-07 | End: 2022-09-23

## 2022-09-23 RX ORDER — CHLORTHALIDONE 25 MG/1
25 TABLET ORAL DAILY
Qty: 90 | Refills: 2 | Status: DISCONTINUED | COMMUNITY
Start: 2020-08-19 | End: 2022-09-23

## 2022-09-23 RX ORDER — ATORVASTATIN CALCIUM 40 MG/1
40 TABLET, FILM COATED ORAL DAILY
Qty: 90 | Refills: 3 | Status: DISCONTINUED | COMMUNITY
Start: 2021-04-13 | End: 2022-09-23

## 2022-09-23 NOTE — PROCEDURE
[FreeTextEntry1] : Full pulmonary function testing today shows a mild restrictive ventilatory deficit with an FVC of 2.46 or 66% predicted.  TLC is mildly reduced.  Diffusing capacity is moderately reduced, but mildly reduced when corrected for alveolar volume.  Oxygen saturation is 95% on room air.

## 2022-09-23 NOTE — HISTORY OF PRESENT ILLNESS
[TextBox_4] : This is the first pulmonary appointment for this 76-year-old male with history of morbid obesity, obstructive sleep apnea on CPAP, moderate mitral regurgitation, hyperlipidemia, and hypertension.  He is noted dyspnea on exertion for at least a few years.  He seems to sense this is more during the last 6 months.  He does get short of breath going up 1 flight of stairs.  He also has difficulty with stairs due to his spinal disease.  He smoked cigarettes for 20 pack years and quit in 1988.  He denies coughing, wheezing, sputum production, or hemoptysis.  He is not having recent fevers.  He denies chest pain or palpitations.  He has no history of asthma.  He does have a history of 2 syncopal episodes and was admitted earlier this year to the hospital for this.  His recent cardiac catheterization showed nonobstructive CAD.  Recent echocardiogram did not show pulmonary hypertension.\par \par His last chest x-ray in May 2022 was within normal limits.  CT scan of chest in March 2020 did not show evidence of interstitial lung disease.

## 2022-09-23 NOTE — PHYSICAL EXAM
[No Acute Distress] : no acute distress [Normal Oropharynx] : normal oropharynx [Normal Appearance] : normal appearance [No Neck Mass] : no neck mass [Normal Rate/Rhythm] : normal rate/rhythm [Normal S1, S2] : normal s1, s2 [No Resp Distress] : no resp distress [Clear to Auscultation Bilaterally] : clear to auscultation bilaterally [No Abnormalities] : no abnormalities [Benign] : benign [Normal Gait] : normal gait [No Clubbing] : no clubbing [No Cyanosis] : no cyanosis [No Edema] : no edema [Normal Color/ Pigmentation] : normal color/ pigmentation [No Focal Deficits] : no focal deficits [Oriented x3] : oriented x3 [Normal Affect] : normal affect [TextBox_2] : morbid obesity

## 2022-09-23 NOTE — ASSESSMENT
[FreeTextEntry1] : #1  76-year-old male with chronic dyspnea on exertion.  His last CT scan of chest in 2020 did not show evidence of interstitial lung disease.  His recent echocardiogram showed normal pulmonary artery pressure.  Consider chronic GARCIA secondary to obesity and restrictive lung disease.  Patient also has moderate mitral regurgitation on his echocardiogram.  He was counseled on a strict weight reduction diet today.  He will return for following pulmonary appointment in 6 months or at anytime on an as-needed basis.  Consider repeat CT scan of chest without contrast in the future.  Continue following with cardiology as well.

## 2022-09-28 ENCOUNTER — RX RENEWAL (OUTPATIENT)
Age: 77
End: 2022-09-28

## 2022-10-04 DIAGNOSIS — R05.3 CHRONIC COUGH: ICD-10-CM

## 2022-10-04 DIAGNOSIS — R06.09 OTHER FORMS OF DYSPNEA: ICD-10-CM

## 2022-10-14 ENCOUNTER — RESULT REVIEW (OUTPATIENT)
Age: 77
End: 2022-10-14

## 2022-10-14 ENCOUNTER — OUTPATIENT (OUTPATIENT)
Dept: OUTPATIENT SERVICES | Facility: HOSPITAL | Age: 77
LOS: 1 days | End: 2022-10-14
Payer: MEDICARE

## 2022-10-14 DIAGNOSIS — Z98.890 OTHER SPECIFIED POSTPROCEDURAL STATES: Chronic | ICD-10-CM

## 2022-10-14 DIAGNOSIS — R06.09 OTHER FORMS OF DYSPNEA: ICD-10-CM

## 2022-10-14 PROCEDURE — 71046 X-RAY EXAM CHEST 2 VIEWS: CPT | Mod: 26

## 2022-10-14 PROCEDURE — A9567: CPT

## 2022-10-14 PROCEDURE — 71046 X-RAY EXAM CHEST 2 VIEWS: CPT

## 2022-10-14 PROCEDURE — A9540: CPT

## 2022-10-14 PROCEDURE — 78582 LUNG VENTILAT&PERFUS IMAGING: CPT | Mod: MH

## 2022-10-14 PROCEDURE — 78582 LUNG VENTILAT&PERFUS IMAGING: CPT | Mod: 26,MH

## 2022-10-15 DIAGNOSIS — R06.09 OTHER FORMS OF DYSPNEA: ICD-10-CM

## 2022-10-28 ENCOUNTER — RX RENEWAL (OUTPATIENT)
Age: 77
End: 2022-10-28

## 2022-11-04 ENCOUNTER — RX RENEWAL (OUTPATIENT)
Age: 77
End: 2022-11-04

## 2023-01-11 NOTE — PHYSICAL THERAPY INITIAL EVALUATION ADULT - LEVEL OF CONSCIOUSNESS, REHAB EVAL
Follow up    Pt presents with self to discuss treatment option for recent diagnosed Right Breast cancer.      Biopsy on 12/15/2022  Pathologic Diagnosis   A.   Right sentinel lymph nodes x3, excision:  -Isolated tumor cells (less than 0.1 mm) in 1 of 3 lymph nodes     B.   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   Right breast new lateral half, excision:  -Breast tissue, negative for carcinoma     D.   Right breast new medial half, excision:  -Breast tissue, negative for carcinoma     E.   Right breast, lumpectomy:  -Breast tissue with adenoma with fibrocystic change, usual ductal hyperplasia, sclerosing adenosis, and apocrine metaplasia     Note: smm/p63 stains are reviewed for part E, supporting the interpretation.  Case subjected to pathology staff review.       S/P WIRELESS LUMPECTOMY, RIGHT BREAST, WITH SENTINEL LYMPH NODE BIOPSY NUCLEAR MEDICINE INJECTION IN OPERATING ROOM on 12/15/2022 by Torey Owen MD  S/P Right breast intraoperative radiation therapy on 12/15/2022 by Dr. Jones    Review of Systems   Constitutional: Negative.    HENT:  Negative.    Eyes: Negative.    Respiratory: Negative.    Cardiovascular: Negative.    Gastrointestinal: Negative.    Endocrine: Negative.    Genitourinary: Negative.     Musculoskeletal: Negative.    Skin: Negative.    Neurological: Negative.    Hematological: Negative.    Psychiatric/Behavioral: Negative.          Vitals:    01/11/23 1316   BP: (!) 135/96   BP Location: LUE - Left upper extremity   Patient Position: Sitting   Cuff Size: Regular   Pulse: 68   Resp: 15   Temp: 97.7 °F (36.5 °C)   TempSrc: Temporal   SpO2: 96%   Weight: 94 kg (207 lb 3.7 oz)   PainSc:  0        Past Medical History:   Diagnosis Date   • Colon polyps    • Essential (primary) hypertension    • High cholesterol    • Intraductal papilloma    • Obesity (BMI 30-39.9)    • Personal history of COVID-19  2022    fatigue and runny nose; Rapid test at  home and the [facility]   • PONV (postoperative nausea and vomiting)    • Skin cancer         Past Surgical History:   Procedure Laterality Date   • Basal cell carcinoma excision      BREAST   • Basal cell carcinoma excision Left     breast   • Breast surgery Right     FOR INTRADUCTAL PAPILLOMA x3   •  section, classic     • Open access colonoscopy  2022        Family History   Problem Relation Age of Onset   • Cancer Mother    • Cancer Paternal Uncle    • Cancer, Colon Maternal Grandmother    • Cancer Paternal Uncle         Patient reports her left breast is swollen, but there is no pain or redness. No other symptoms to report. Patient had right breast intraoperative radiation therapy on 12/15/2022. No pace maker or defibrillator present. RT education provided to the pt.  Reviewed and discussed external radiation to the breast, skin care, and general RT expectations.  Answered questions to pt's satisfaction, pt verbalized understanding.    Report off to Dr. Campos       alert

## 2023-01-11 NOTE — ED ADULT TRIAGE NOTE - NSWEIGHTCALCTOOLDRUG_GEN_A_CORE
01/11/23      Yulissa Neil  [address]  [facility] [address]    Dear Yulissa Neil,    This letter is to remind you of our home visit scheduled for 1/25/23 at 1:30 pm.  If you are unable to keep this appointment, please call me at [phone].    I look forward to seeing you at our next home visit.    Sincerely,      Loretta Bales    [facility] [place] [facility]  [address]  [address] [address]  
 used

## 2023-01-12 ENCOUNTER — RX RENEWAL (OUTPATIENT)
Age: 78
End: 2023-01-12

## 2023-01-26 NOTE — ED PROVIDER NOTE - CHIEF COMPLAINT
Pounds Preamble Statement (Weight Entered In Details Tab): Reported Weight in pounds: Retinoid Dermatitis Aggressive Treatment: I recommended more frequent application of Cetaphil or CeraVe to the areas of dermatitis. I also prescribed a topical steroid for twice daily use until the dermatitis resolves. The patient is a 75y Male complaining of dizziness. Detail Level: Zone Female Completion Statement: After discussing her treatment course we decided to discontinue isotretinoin therapy at this time. I explained that she would need to continue her birth control methods for at least one month after the last dosage. She should also get a pregnancy test one month after the last dose. She shouldn't donate blood for one month after the last dose. She should call with any new symptoms of depression. Kilograms Preamble Statement (Weight Entered In Details Tab): Reported Weight in kilograms: Months Of Therapy Completed: 1 Male Completion Statement: After discussing his treatment course we decided to discontinue isotretinoin therapy at this time. He shouldn't donate blood for one month after the last dose. He should call with any new symptoms of depression. Show How Many Months Of Anticipated Therapy Are Left: No Target Cumulative Dosage (In Mg/Kg): 135 Hypercholesterolemia Monitoring: I explained this is common when taking isotretinoin. We will monitor closely. Next Month's Dosage: Continue Current Dosage Myalgia Monitoring: I explained this is common when taking isotretinoin. If this worsens they will contact us. What Is The Patient's Gender: Female Counseling Text: I reviewed the side effect in detail. Patient should get monthly blood tests, not donate blood, not drive at night if vision affected, and not share medication. Hypertriglyceridemia Treatment: I explained this is common when taking isotretinoin. If this worsens they will contact us. They may try OTC ibuprofen. Xerosis Aggressive Treatment: I recommended application of Cetaphil or CeraVe numerous times a day and before going to bed to all dry areas. I also prescribed a topical steroid for twice daily use. Show Text Field For Brand Names Of Contraception?: Yes Retinoid Dermatitis Normal Treatment: I recommended more frequent application of Cetaphil or CeraVe to the areas of dermatitis. Cheilitis Aggressive Treatment: I recommended application of Vaseline or Aquaphor numerous times a day (as often as every hour) and before going to bed. I also prescribed a topical steroid for twice daily use. Upper Range (In Mg/Kg): 150 Headache Monitoring: I recommended monitoring the headaches for now. There is no evidence of increased intracranial pressure. They were instructed to call if the headaches are worsening. Lower Range (In Mg/Kg): 120 Xerosis Aggressive Treatment: I recommended application of Cetaphil or CeraVe numerous times a day going to bed to all dry areas. I also prescribed a topical steroid for twice daily use. Female Pregnancy Counseling Text: Female patients should also be on two forms of birth control while taking this medication and for one month after their last dose. Cheilitis Normal Treatment: I recommended application of Vaseline or Aquaphor numerous times a day (as often as every hour) and before going to bed. Weight Units: pounds Use Therapeutic Ranged Or Therapeutic Target: please select Range or Target Xerosis Normal Treatment: I recommended application of Cetaphil or CeraVe numerous times a day and before going to bed to all dry areas. Xerosis Normal Treatment: I recommended application of Cetaphil or CeraVe numerous times a day going to bed to all dry areas. Nosebleeds Normal Treatment: I explained this is common when taking isotretinoin. I recommended saline mist in each nostril multiple times a day. If this worsens they will contact us.

## 2023-01-28 ENCOUNTER — RX RENEWAL (OUTPATIENT)
Age: 78
End: 2023-01-28

## 2023-02-02 ENCOUNTER — NON-APPOINTMENT (OUTPATIENT)
Age: 78
End: 2023-02-02

## 2023-02-02 ENCOUNTER — APPOINTMENT (OUTPATIENT)
Dept: FAMILY MEDICINE | Facility: CLINIC | Age: 78
End: 2023-02-02
Payer: MEDICARE

## 2023-02-02 VITALS
HEART RATE: 66 BPM | HEIGHT: 68 IN | SYSTOLIC BLOOD PRESSURE: 130 MMHG | DIASTOLIC BLOOD PRESSURE: 60 MMHG | TEMPERATURE: 97.7 F | RESPIRATION RATE: 18 BRPM | BODY MASS INDEX: 37.59 KG/M2 | WEIGHT: 248 LBS | OXYGEN SATURATION: 98 %

## 2023-02-02 PROCEDURE — 93000 ELECTROCARDIOGRAM COMPLETE: CPT

## 2023-02-02 PROCEDURE — 99214 OFFICE O/P EST MOD 30 MIN: CPT | Mod: 25

## 2023-02-02 NOTE — PHYSICAL EXAM
[No Acute Distress] : no acute distress [Well Developed] : well developed [Well-Appearing] : well-appearing [Normal Voice/Communication] : normal voice/communication [Normal Sclera/Conjunctiva] : normal sclera/conjunctiva [Normal Outer Ear/Nose] : the outer ears and nose were normal in appearance [Normal Oropharynx] : the oropharynx was normal [No JVD] : no jugular venous distention [Supple] : supple [No Respiratory Distress] : no respiratory distress  [No Accessory Muscle Use] : no accessory muscle use [Clear to Auscultation] : lungs were clear to auscultation bilaterally [Normal Rate] : normal rate  [Regular Rhythm] : with a regular rhythm [No Murmur] : no murmur heard [No Edema] : there was no peripheral edema [Soft] : abdomen soft [Non Tender] : non-tender [Non-distended] : non-distended [No Masses] : no abdominal mass palpated [Normal Supraclavicular Nodes] : no supraclavicular lymphadenopathy [Normal Posterior Cervical Nodes] : no posterior cervical lymphadenopathy [Normal Anterior Cervical Nodes] : no anterior cervical lymphadenopathy [No CVA Tenderness] : no CVA  tenderness [No Spinal Tenderness] : no spinal tenderness [Coordination Grossly Intact] : coordination grossly intact [No Focal Deficits] : no focal deficits [Normal Gait] : normal gait [Deep Tendon Reflexes (DTR)] : deep tendon reflexes were 2+ and symmetric [Speech Grossly Normal] : speech grossly normal [Alert and Oriented x3] : oriented to person, place, and time [de-identified] : Morbid obesity

## 2023-02-02 NOTE — ASSESSMENT
[High Risk Surgery - Intraperitoneal, Intrathoracic or Supringuinal Vascular Procedures] : High Risk Surgery - Intraperitoneal, Intrathoracic or Supringuinal Vascular Procedures - No (0) [Ischemic Heart Disease] : Ischemic Heart Disease - No (0) [Congestive Heart Failure] : Congestive Heart Failure - No (0) [Prior Cerebrovascular Accident or TIA] : Prior Cerebrovascular Accident or TIA - No (0) [Creatinine >= 2mg/dL (1 Point)] : Creatinine >= 2mg/dL - No (0) [Insulin-dependent Diabetic (1 Point)] : Insulin-dependent Diabetic - No (0) [0] : 0 , RCRI Class: I, Risk of Post-Op Cardiac Complications: 3.9%, 95% CI for Risk Estimate: 2.8% - 5.4% [Patient Optimized for Surgery] : Patient optimized for surgery [No Further Testing Recommended] : no further testing recommended [FreeTextEntry4] : This is a low risk procedure and a RCRI 0 patient only risk factors are morbid obesity which for this procedure should not be important impression is that surgery should be performed no further work-up needed [FreeTextEntry2] : NA

## 2023-02-02 NOTE — HISTORY OF PRESENT ILLNESS
[Aortic Stenosis] : no aortic stenosis [Atrial Fibrillation] : no atrial fibrillation [Coronary Artery Disease] : no coronary artery disease [Recent Myocardial Infarction] : no recent myocardial infarction [Asthma] : no asthma [COPD] : no COPD [Sleep Apnea] : sleep apnea [Smoker] : not a smoker [Family Member] : no family member with adverse anesthesia reaction/sudden death [Self] : no previous adverse anesthesia reaction [Chronic Anticoagulation] : no chronic anticoagulation [Chronic Kidney Disease] : no chronic kidney disease [Diabetes] : no diabetes [(Patient denies any chest pain, claudication, dyspnea on exertion, orthopnea, palpitations or syncope)] : Patient denies any chest pain, claudication, dyspnea on exertion, orthopnea, palpitations or syncope [FreeTextEntry1] : L thumb tendon release [FreeTextEntry2] : Feb 3,2023 [FreeTextEntry3] : Malik [FreeTextEntry4] : Patient here today for preoperative evaluation prior to a tendon release on his left thumb this will be performed on February 3 by Dr. Babcock patient is no longer able to utilize his thumb to his levels past history includes morbid obesity hypertension he also has had recurrent cardiac evaluations for shortness of breath a thorough cardiac evaluation in the last year including stress test echo cardiogram were all within normal limits [FreeTextEntry7] : EKG on February 2, 2023 reveals no change from prior tracings no acute abnormalities

## 2023-02-02 NOTE — RESULTS/DATA
[] : results reviewed [de-identified] : No change from prior tracings [de-identified] : Patient had thorough cardiac work-up with work-up within the last year consisting of echo stress test and EKG

## 2023-04-07 ENCOUNTER — RX RENEWAL (OUTPATIENT)
Age: 78
End: 2023-04-07

## 2023-04-23 ENCOUNTER — RX RENEWAL (OUTPATIENT)
Age: 78
End: 2023-04-23

## 2023-05-06 ENCOUNTER — INPATIENT (INPATIENT)
Facility: HOSPITAL | Age: 78
LOS: 1 days | Discharge: HOME | DRG: 262 | End: 2023-05-08
Attending: HOSPITALIST | Admitting: HOSPITALIST
Payer: MEDICARE

## 2023-05-06 VITALS
TEMPERATURE: 98 F | RESPIRATION RATE: 18 BRPM | WEIGHT: 244.93 LBS | DIASTOLIC BLOOD PRESSURE: 92 MMHG | SYSTOLIC BLOOD PRESSURE: 152 MMHG | OXYGEN SATURATION: 99 % | HEART RATE: 112 BPM | HEIGHT: 68 IN

## 2023-05-06 DIAGNOSIS — Z98.890 OTHER SPECIFIED POSTPROCEDURAL STATES: Chronic | ICD-10-CM

## 2023-05-06 LAB
ALBUMIN SERPL ELPH-MCNC: 3.3 G/DL — SIGNIFICANT CHANGE UP (ref 3.3–5)
ALP SERPL-CCNC: 74 U/L — SIGNIFICANT CHANGE UP (ref 40–120)
ALT FLD-CCNC: 26 U/L — SIGNIFICANT CHANGE UP (ref 12–78)
ANION GAP SERPL CALC-SCNC: 6 MMOL/L — SIGNIFICANT CHANGE UP (ref 5–17)
APTT BLD: 25.5 SEC — LOW (ref 27.5–35.5)
AST SERPL-CCNC: 23 U/L — SIGNIFICANT CHANGE UP (ref 15–37)
BASOPHILS # BLD AUTO: 0.04 K/UL — SIGNIFICANT CHANGE UP (ref 0–0.2)
BASOPHILS NFR BLD AUTO: 0.5 % — SIGNIFICANT CHANGE UP (ref 0–2)
BILIRUB SERPL-MCNC: 0.3 MG/DL — SIGNIFICANT CHANGE UP (ref 0.2–1.2)
BUN SERPL-MCNC: 38 MG/DL — HIGH (ref 7–23)
CALCIUM SERPL-MCNC: 8.6 MG/DL — SIGNIFICANT CHANGE UP (ref 8.5–10.1)
CHLORIDE SERPL-SCNC: 117 MMOL/L — HIGH (ref 96–108)
CO2 SERPL-SCNC: 20 MMOL/L — LOW (ref 22–31)
CREAT SERPL-MCNC: 1.65 MG/DL — HIGH (ref 0.5–1.3)
EGFR: 42 ML/MIN/1.73M2 — LOW
EOSINOPHIL # BLD AUTO: 0.18 K/UL — SIGNIFICANT CHANGE UP (ref 0–0.5)
EOSINOPHIL NFR BLD AUTO: 2.4 % — SIGNIFICANT CHANGE UP (ref 0–6)
GLUCOSE SERPL-MCNC: 116 MG/DL — HIGH (ref 70–99)
HCT VFR BLD CALC: 33.5 % — LOW (ref 39–50)
HGB BLD-MCNC: 10.9 G/DL — LOW (ref 13–17)
IMM GRANULOCYTES NFR BLD AUTO: 0.5 % — SIGNIFICANT CHANGE UP (ref 0–0.9)
INR BLD: 1.07 RATIO — SIGNIFICANT CHANGE UP (ref 0.88–1.16)
LYMPHOCYTES # BLD AUTO: 1 K/UL — SIGNIFICANT CHANGE UP (ref 1–3.3)
LYMPHOCYTES # BLD AUTO: 13.3 % — SIGNIFICANT CHANGE UP (ref 13–44)
MCHC RBC-ENTMCNC: 30 PG — SIGNIFICANT CHANGE UP (ref 27–34)
MCHC RBC-ENTMCNC: 32.5 GM/DL — SIGNIFICANT CHANGE UP (ref 32–36)
MCV RBC AUTO: 92.3 FL — SIGNIFICANT CHANGE UP (ref 80–100)
MONOCYTES # BLD AUTO: 0.6 K/UL — SIGNIFICANT CHANGE UP (ref 0–0.9)
MONOCYTES NFR BLD AUTO: 8 % — SIGNIFICANT CHANGE UP (ref 2–14)
NEUTROPHILS # BLD AUTO: 5.65 K/UL — SIGNIFICANT CHANGE UP (ref 1.8–7.4)
NEUTROPHILS NFR BLD AUTO: 75.3 % — SIGNIFICANT CHANGE UP (ref 43–77)
PLATELET # BLD AUTO: 171 K/UL — SIGNIFICANT CHANGE UP (ref 150–400)
POTASSIUM SERPL-MCNC: 4.6 MMOL/L — SIGNIFICANT CHANGE UP (ref 3.5–5.3)
POTASSIUM SERPL-SCNC: 4.6 MMOL/L — SIGNIFICANT CHANGE UP (ref 3.5–5.3)
PROT SERPL-MCNC: 7.1 GM/DL — SIGNIFICANT CHANGE UP (ref 6–8.3)
PROTHROM AB SERPL-ACNC: 12.4 SEC — SIGNIFICANT CHANGE UP (ref 10.5–13.4)
RAPID RVP RESULT: DETECTED
RBC # BLD: 3.63 M/UL — LOW (ref 4.2–5.8)
RBC # FLD: 14.4 % — SIGNIFICANT CHANGE UP (ref 10.3–14.5)
RV+EV RNA SPEC QL NAA+PROBE: DETECTED
SARS-COV-2 RNA SPEC QL NAA+PROBE: SIGNIFICANT CHANGE UP
SODIUM SERPL-SCNC: 143 MMOL/L — SIGNIFICANT CHANGE UP (ref 135–145)
TROPONIN I, HIGH SENSITIVITY RESULT: 9.86 NG/L — SIGNIFICANT CHANGE UP
WBC # BLD: 7.51 K/UL — SIGNIFICANT CHANGE UP (ref 3.8–10.5)
WBC # FLD AUTO: 7.51 K/UL — SIGNIFICANT CHANGE UP (ref 3.8–10.5)

## 2023-05-06 PROCEDURE — 93010 ELECTROCARDIOGRAM REPORT: CPT

## 2023-05-06 PROCEDURE — 71045 X-RAY EXAM CHEST 1 VIEW: CPT | Mod: 26

## 2023-05-06 PROCEDURE — 99285 EMERGENCY DEPT VISIT HI MDM: CPT

## 2023-05-06 RX ORDER — ALLOPURINOL 300 MG
1 TABLET ORAL
Qty: 0 | Refills: 0 | DISCHARGE

## 2023-05-06 RX ORDER — AMLODIPINE BESYLATE 2.5 MG/1
10 TABLET ORAL DAILY
Refills: 0 | Status: DISCONTINUED | OUTPATIENT
Start: 2023-05-06 | End: 2023-05-08

## 2023-05-06 RX ORDER — ATORVASTATIN CALCIUM 80 MG/1
40 TABLET, FILM COATED ORAL AT BEDTIME
Refills: 0 | Status: DISCONTINUED | OUTPATIENT
Start: 2023-05-06 | End: 2023-05-08

## 2023-05-06 RX ORDER — FLUTICASONE PROPIONATE 50 MCG
2 SPRAY, SUSPENSION NASAL
Qty: 0 | Refills: 0 | DISCHARGE

## 2023-05-06 RX ORDER — LOSARTAN POTASSIUM 100 MG/1
25 TABLET, FILM COATED ORAL DAILY
Refills: 0 | Status: DISCONTINUED | OUTPATIENT
Start: 2023-05-06 | End: 2023-05-08

## 2023-05-06 RX ORDER — ENOXAPARIN SODIUM 100 MG/ML
40 INJECTION SUBCUTANEOUS EVERY 24 HOURS
Refills: 0 | Status: DISCONTINUED | OUTPATIENT
Start: 2023-05-06 | End: 2023-05-08

## 2023-05-06 RX ORDER — TAMSULOSIN HYDROCHLORIDE 0.4 MG/1
0.4 CAPSULE ORAL AT BEDTIME
Refills: 0 | Status: DISCONTINUED | OUTPATIENT
Start: 2023-05-06 | End: 2023-05-08

## 2023-05-06 RX ORDER — ATORVASTATIN CALCIUM 80 MG/1
1 TABLET, FILM COATED ORAL
Qty: 0 | Refills: 0 | DISCHARGE

## 2023-05-06 RX ORDER — AMLODIPINE BESYLATE 2.5 MG/1
1 TABLET ORAL
Qty: 0 | Refills: 0 | DISCHARGE

## 2023-05-06 RX ORDER — EZETIMIBE 10 MG/1
1 TABLET ORAL
Qty: 0 | Refills: 0 | DISCHARGE

## 2023-05-06 RX ORDER — METOPROLOL TARTRATE 50 MG
50 TABLET ORAL DAILY
Refills: 0 | Status: DISCONTINUED | OUTPATIENT
Start: 2023-05-06 | End: 2023-05-07

## 2023-05-06 RX ORDER — MIRABEGRON 50 MG/1
1 TABLET, EXTENDED RELEASE ORAL
Qty: 0 | Refills: 0 | DISCHARGE

## 2023-05-06 RX ORDER — TAMSULOSIN HYDROCHLORIDE 0.4 MG/1
1 CAPSULE ORAL
Qty: 0 | Refills: 0 | DISCHARGE

## 2023-05-06 RX ORDER — LOSARTAN POTASSIUM 100 MG/1
0.5 TABLET, FILM COATED ORAL
Qty: 0 | Refills: 0 | DISCHARGE

## 2023-05-06 RX ORDER — MULTIVIT-MIN/FERROUS GLUCONATE 9 MG/15 ML
1 LIQUID (ML) ORAL
Qty: 0 | Refills: 0 | DISCHARGE

## 2023-05-06 RX ORDER — SODIUM CHLORIDE 9 MG/ML
1000 INJECTION INTRAMUSCULAR; INTRAVENOUS; SUBCUTANEOUS ONCE
Refills: 0 | Status: COMPLETED | OUTPATIENT
Start: 2023-05-06 | End: 2023-05-06

## 2023-05-06 RX ORDER — ALLOPURINOL 300 MG
100 TABLET ORAL DAILY
Refills: 0 | Status: DISCONTINUED | OUTPATIENT
Start: 2023-05-06 | End: 2023-05-08

## 2023-05-06 RX ADMIN — SODIUM CHLORIDE 1000 MILLILITER(S): 9 INJECTION INTRAMUSCULAR; INTRAVENOUS; SUBCUTANEOUS at 13:20

## 2023-05-06 RX ADMIN — TAMSULOSIN HYDROCHLORIDE 0.4 MILLIGRAM(S): 0.4 CAPSULE ORAL at 20:15

## 2023-05-06 RX ADMIN — ENOXAPARIN SODIUM 40 MILLIGRAM(S): 100 INJECTION SUBCUTANEOUS at 20:15

## 2023-05-06 NOTE — ED PROVIDER NOTE - PHYSICAL EXAMINATION
Constitutional: NAD AAOx3  Eyes: PERRLA EOMI  Head: Normocephalic atraumatic  ENT: No cooper sign, no raccoon eyes, no hemotympanum, no csf rhinorrhea, no nasal septal hematoma  Mouth: Dry mucus membranes   Cardiac: Slightly tachycardic  Resp: unlabored breathing  GI: Abd s/nt/nd  Neuro: grossly normal and intact GCS 15 no neuro deficits  Skin: No rashes, no bruising to chest, back, abdomen or extremities. Abrasion to right knee.  Msk: No midline spinal ttp, full ROM of neck, c-collar cleared clinically and with provocative testing, no ttp of facial bones, no ttp to chest wall, pelvis stable, full ROM of all extremities without any ttp of extremities Constitutional: NAD AAOx3  Eyes: PERRLA EOMI  Head: Normocephalic atraumatic  ENT: No cooper sign, no raccoon eyes,    Mouth: Dry mucus membranes   Cardiac: Slightly tachycardic  Resp: unlabored breathing clear b/l  GI: Abd s/nt/nd  Neuro: grossly normal and intact GCS 15 no neuro deficits  Skin: No rashes, no bruising to chest, back, abdomen or extremities. Abrasion to right knee.  Msk: No midline spinal ttp, full ROM of neck, c-collar cleared clinically and with provocative testing, no ttp of facial bones, no ttp to chest wall, pelvis stable, full ROM of all extremities without any ttp of extremities

## 2023-05-06 NOTE — ED ADULT NURSE NOTE - NSFALLRSKOUTCOME_ED_ALL_ED
Fall Risk Interpolation Flap Text: A decision was made to reconstruct the defect utilizing an interpolation axial flap and a staged reconstruction.  A telfa template was made of the defect.  This telfa template was then used to outline the interpolation flap.  The donor area for the pedicle flap was then injected with anesthesia.  The flap was excised through the skin and subcutaneous tissue down to the layer of the underlying musculature.  The interpolation flap was carefully excised within this deep plane to maintain its blood supply.  The edges of the donor site were undermined.   The donor site was closed in a primary fashion.  The pedicle was then rotated into position and sutured.  Once the tube was sutured into place, adequate blood supply was confirmed with blanching and refill.  The pedicle was then wrapped with xeroform gauze and dressed appropriately with a telfa and gauze bandage to ensure continued blood supply and protect the attached pedicle.

## 2023-05-06 NOTE — ED ADULT TRIAGE NOTE - CHIEF COMPLAINT QUOTE
Pt BIBEMS c/o syncopal episode. pt states he was walking in the nursery when he became dizzy, sat down and then synopsized. Denies head strike. abrasion to right knee noted. denies chest pain. pt also states he has a cold and took cold medications as well as his regulars heart medications.

## 2023-05-06 NOTE — ED ADULT NURSE NOTE - NSIMPLEMENTINTERV_GEN_ALL_ED
Implemented All Fall Risk Interventions:  [place] to call system. Call bell, personal items and telephone within reach. Instruct patient to call for assistance. Room bathroom lighting operational. Non-slip footwear when patient is off stretcher. Physically safe environment: no spills, clutter or unnecessary equipment. Stretcher in lowest position, wheels locked, appropriate side rails in place. Provide visual cue, wrist band, yellow gown, etc. Monitor gait and stability. Monitor for mental status changes and reorient to person, place, and time. Review medications for side effects contributing to fall risk. Reinforce activity limits and safety measures with patient and family.

## 2023-05-06 NOTE — ED PROVIDER NOTE - CLINICAL SUMMARY MEDICAL DECISION MAKING FREE TEXT BOX
76 y/o male with PMHx presents to ED for dizziness and syncope. Pt states he has an upper respiratory infection and was at a nursery today when he began to feel dizzy. Leaned against a fence, passed out and landed don his knee. Feels slightly improved. No CP or SOB. Pt states this happened to him few months ago secondary to dehydration and feels the same. During admission in august had cardiac catheterization that showed no obstructive disease. Likely syncope from dehydration. Will check labs and reassess. 76 y/o male with PMHx presents to ED for dizziness and syncope. Pt states he has an upper respiratory infection and was at a nursery today when he began to feel dizzy. Leaned against a fence, passed out and landed on his knee. Feels slightly improved. No CP or SOB. Pt states this happened to him few months ago secondary to dehydration and feels the same. During admission in august had cardiac catheterization that showed no obstructive disease. Likely syncope from dehydration. Will check labs and reassess.    All labs and imaging reviewed by myself.  No actionable labs at this time.  Case was discussed with patient's outpatient PMD who is concerned about patient's persistent dizziness and wants patient to be admitted.  Case discussed with the hospitalist accepts.

## 2023-05-06 NOTE — H&P ADULT - ASSESSMENT
syncope, can be related to URI vs sick sinus syndrome vs other etiology  -admit to tele  -ecg in am   -check echo   -cardio consult   -ep consult   -f/u am labs     URI secondary to rhinovirus   -cont supportive care     HTN, HLD, BPH, bladder CA,    -cont home meds    YO  -cont CPAP qhs    dvt prophylaxis   -lovenox sc     full code

## 2023-05-06 NOTE — ED PROVIDER NOTE - NS_ ATTENDINGSCRIBEDETAILS _ED_A_ED_FT
I, Jose Oden MD,  performed the initial face to face bedside interview with this patient regarding history of present illness, review of symptoms and relevant past medical, social and family history.  I completed an independent physical examination.  I was the initial provider who evaluated this patient.   I personally saw the patient and performed a substantive portion of the visit including all aspects of the medical decision making.  The history, relevant review of systems, past medical and surgical history, medical decision making, and physical examination was documented by the scribe in my presence and I attest to the accuracy of the documentation.

## 2023-05-06 NOTE — ED PROVIDER NOTE - NS ED ROS FT
Constitutional: No fever or chills  Eyes: No visual changes  HEENT: No throat pain  CV: (+) syncope  Resp: No SOB no cough  GI: No abd pain, nausea or vomiting  : No dysuria  MSK: No musculoskeletal pain  Skin: (+) abrasion  Neuro: (+) dizziness

## 2023-05-06 NOTE — H&P ADULT - HISTORY OF PRESENT ILLNESS
78 y/o male with h/o HTN, HLD, BPH, bladder CA, obesity, YO on CPAP was BIBA after syncopal episode.  Today, pt was at a nursery when he began to feel dizzy, leaned over a stool and passed out. Denies headstrike or injury. Pt states he has an upper respiratory infection and  has been taking some medication for it. States that he landed on his right knee, with right knee abrasion. Currently feels okay.  no prior episodes in the past.  Denies CP or SOB. +prior episodes over the last year, last in 10/2022 which he states workup was negative.  does endorse soboe which has been told is due to his weight, therefore has started mougaro this month.    cardiac cath done in 9/2022-  Non-obstructive coronary artery disease     In ED, tachycardic on admission. rvp- rhinovirus. cxr- small plerual effusion.  tele monitor- tachy-maikel with avg around 70s but drops  to 30s.

## 2023-05-06 NOTE — ED ADULT NURSE NOTE - EXTENSIONS OF SELF_ADULT
Pediatric Hematology/Oncology   Clinic Visit      Patient Name:  Yao Turner  : 2003   MRN: [id number]    Location of Service: [facility]    Date of Service: 2018  Time: 1:49 PM    Primary Care Physician: Riya Shepard D.O.    Referring Physician: Riya Shepard D.O.    Patient Active Problem List   Diagnosis   • Mass of chest   • Hodgkin's disease, stage IVB (HCC)       HISTORY OF PRESENT ILLNESS:     Chief Complaint: Scheduled follow-up; recently completed XRT.     History of Present Illness: Yao Turner is a 15  y.o. 5  m.o. male who is followed at the [facility] - Pediatric Hematology/Oncology for a diagnosis of Stage 4B Hodgkin disease.  Yao presents to clinic with his mother.  Together, they provide history and appear to be good historians.    Yao finished his mediastinal radiation therapy 2 weeks ago.  He reports that he only developed some chest/swallowing pain near the end of his course, as well as some mild fatigue, but otherwise had no problems.  He is currently doing well and voices no complaints whatsoever.    Review of Systems:     Constitutional: Afebrile.  Without recent illness.  Energy and activity are good.   HENT: Negative for ear pain, nasal congestion or rhinorrhea, nosebleeds, or sore throat.  No mouth sores.  Eyes: Negative for pain, redness, drainage, visual changes.  Respiratory: Negative for shortness of breath or noisy breathing.   Cardiovascular: Negative for chest pain.    Gastrointestinal: Negative for nausea, vomiting, abdominal pain, diarrhea, constipation or blood in stool.     Musculoskeletal: Negative for joint or muscle pain or swelling.    Skin: Negative for rash, signs of infection.  Neurological: Negative for numbness, tingling, sensory changes, weakness or headaches.     Psychiatric/Behavioral: No changes in mood, appropriate for age. .    PAST MEDICAL HISTORY:     Past Medical History:    Past  "Medical History:   Diagnosis Date   • Anemia    • Cancer (HCC)    • Hodgkin's lymphoma (HCC)         Past Surgical History:     Past Surgical History:   Procedure Laterality Date   • CATH PLACEMENT N/A 3/8/2018    Procedure: CATH PLACEMENT/ PORT;  Surgeon: Betty Brito M.D.;  Location: SURGERY [facility];  Service: General   • OTHER  2014    tooth removal due to  abcess        Birth/Developmental History:  No birth history on file.     Allergies:   Allergies as of 08/15/2018   • (No Known Allergies)       Home Medications:    Current Outpatient Prescriptions   Medication Sig Dispense Refill   • Iron Combinations (IRON COMPLEX PO) Take  by mouth.     • sulfamethoxazole-trimethoprim (BACTRIM DS) 800-160 MG tablet Take 1 Tab by mouth 2 times a day. On Saturdays and Sundays only. 20 Tab 9   • lidocaine-prilocaine (EMLA) 2.5-2.5 % Cream Apply to Portacath site as needed. 30 g 5     No current facility-administered medications for this visit.         Social History:   Back in school (10th grade)!      OBJECTIVE:     Vitals:   Blood pressure 127/75, pulse 95, temperature 36.7 °C (98.1 °F), resp. rate 16, height 1.735 m (5' 8.31\"), weight 93.9 kg (207 lb 0.2 oz), SpO2 98 %.    Labs:    No visits with results within 2 Day(s) from this visit.   Latest known visit with results is:   Hospital Outpatient Visit on 07/18/2018   Component Date Value   • WBC 07/18/2018 4.9    • RBC 07/18/2018 4.94    • Hemoglobin 07/18/2018 14.4    • Hematocrit 07/18/2018 43.8    • MCV 07/18/2018 88.7    • MCH 07/18/2018 29.1    • MCHC 07/18/2018 32.9*   • RDW 07/18/2018 45.8*   • Platelet Count 07/18/2018 236    • MPV 07/18/2018 9.3    • Neutrophils-Polys 07/18/2018 56.20    • Lymphocytes 07/18/2018 13.60*   • Monocytes 07/18/2018 9.70    • Eosinophils 07/18/2018 19.30*   • Basophils 07/18/2018 0.80    • Immature Granulocytes 07/18/2018 0.40*   • Nucleated RBC 07/18/2018 0.00    • Neutrophils (Absolute) 07/18/2018 2.77    • Lymphs " (Absolute) 07/18/2018 0.67*   • Monos (Absolute) 07/18/2018 0.48    • Eos (Absolute) 07/18/2018 0.95*   • Baso (Absolute) 07/18/2018 0.04    • Immature Granulocytes (a* 07/18/2018 0.02    • NRBC (Absolute) 07/18/2018 0.00        Physical Exam:    Constitutional: Well-developed, well-nourished, and in no distress.  Well appearing, a bit heavier than last time.  HENT: Normocephalic and atraumatic; regrowth of hair. No nasal congestion or rhinorrhea. Oropharynx is clear and moist. No oral ulcerations or sores.    Eyes: Conjunctivae are normal. Pupils are equal, round, and reactive to light. No scleral icterus.  Neck: Normal range of motion of neck, no adenopathy.    Cardiovascular: Normal rate, regular rhythm and normal heart sounds.  No murmur heard. DP/radial pulses 2+, cap refill < 2 sec  Pulmonary/Chest: Effort normal and breath sounds normal. No respiratory distress. Symmetric expansion.  No crackles or wheezes.  Abdomen: Soft. Bowel sounds are normal. No distension and no mass. There is no hepatosplenomegaly.    Genitourinary:  Deferred  Musculoskeletal: Normal range of motion of lower and upper extremities bilaterally. No tenderness to palpation of elbows, wrists, hands, knees, ankles and feet bilaterally.   Lymphadenopathy: No cervical adenopathy, axillary adenopathy or inguinal adenopathy.   Neurological: Alert and oriented to person and place. Exhibits normal muscle tone bilaterally in upper and lower extremities. Gait normal. Coordination normal.    Skin: Skin is warm, dry and pink.  No rash or evidence of skin infection.  No pallor.   Psychiatric: Mood and affect normal for age.    ASSESSMENT AND PLAN:     Problem List Items Addressed This Visit     Hodgkin's disease, stage IVB (HCC)      Yao has completed all planned treatment!  He received 5 cycles of ABVE-PC combination chemotherapy, exhibiting a more or less complete response (by PET/CT scan) after the first two cycles.  After some discussion, he has  "gone on to receive a course of radiation to his originally bulky mediastinal disease.  Throughout treatment, Yao had minimal side effects and he seems to be well recovered at this point.      Today in clinic, we had a ceremony to celebrate the end of Yao's treatment.  I did, however, remind Yao and his mother that he remains at risk (probably on the order of 15-20%) for disease recurrence; unfortunately, this can occur as long as 10 years after initial treatment.  Since his most recent scans were only about 2 months ago, I am not repeating them at this time.  We will see him back in late September for CT scan of chest, abdomen and pelvis, planning to repeat the scans (along with blood work) every 3 months for the first 18 months and less frequently thereafter.     For now, Yao should continue Bactrim weekend prophylaxis.  Otherwise, I am placing no restrictions on his activities.  He is scheduled to have his Port-A-Cath removed next week.     Prior to his diagnosis, Yao lost a significant amount of weight (50 pounds, by report).  While I am happy to see him regaining some of the weight that he lost, I reminded him today that he \"has potential to get chunky\" and he should try to control this.  I also reminded him that one potential late adverse effect of chemotherapy is \"weakening of the heart muscle\" and that this issue is more likely to become clinically significant if he is overweight.     Finally, I reminded him that some of the chemotherapy he received might affect his fertility.  We can certainly assess this when he is older and possibly considering starting a family.  On the other hand, he should not think of chemotherapy as \"birth control,\" as the risk of fertility issues is far from 100%.      It has been my great pleasure to take care of Yao and his family.  I am obviously hopeful that his disease will continue in remission indefinitely and I look forward to monitoring his overall progress.    Total " time today approx 25 minutes, of which > 50% was spent on counseling and coordination of care.    RAMESH Doss MD  Pediatric Hematology / Oncology  [facility]  Cell.  [phone]  [facility]. [phone]           None

## 2023-05-06 NOTE — ED ADULT NURSE NOTE - OBJECTIVE STATEMENT
76 yo male states he was walking at the nursery with his wife when he felt suddenly dizzy and sat down on a bench. He lost consciousness and fell over from the bench, sustaining an abrasion to his right knee. He regained consciousness after a "few seconds." He states this has happened in the past and wife states he becomes dizzy at least once daily. At present denies pain, A&Ox4, slightly dizzy at times, admits chronic weakness bilateral legs, PERRL.

## 2023-05-06 NOTE — ED PROVIDER NOTE - OBJECTIVE STATEMENT
78 y/o male with PMHx of HTN, HLD, BPH, bladder CA, obesity, presents to ED for dizziness and syncope. Pt states he has an upper respiratory infection and  has been taking some medication for it. Today pt was at a nursery today when he began to feel dizzy. Pt leaned over a stool and passed out. Denies headstrike or injury. States that he landed on his right knee, currently with right knee abrasion. Currently feels improved. Denies CP or SOB. Pt states this happened to him October 2022 due to  dehydration and episode today feels the same.

## 2023-05-06 NOTE — H&P ADULT - NSHPPHYSICALEXAM_GEN_ALL_CORE
PHYSICAL EXAM:    General: NAD, Alert & Oriented X3. obese   HEENT: NC/AT  Respiratory: Clear breath sounds bilaterally.  No wheezing, rales or rhonchi  Cardiovascular: S1 and S2, regular rate and rhythm.  No murmurs, gallops or rubs  Gastrointestinal: Soft, non-tender, non-distended. No guarding, rebound tenderness, +BS  Extremities: +2 peripheral pulses bilaterally.  No clubbing, cyanosis, or edema.    Neurological: No focal deficits  Musculoskeletal: 5/5 strength bilateral upper and lower extremities  Skin: No rashes

## 2023-05-07 DIAGNOSIS — R55 SYNCOPE AND COLLAPSE: ICD-10-CM

## 2023-05-07 LAB
ANION GAP SERPL CALC-SCNC: 3 MMOL/L — LOW (ref 5–17)
BUN SERPL-MCNC: 37 MG/DL — HIGH (ref 7–23)
CALCIUM SERPL-MCNC: 9.3 MG/DL — SIGNIFICANT CHANGE UP (ref 8.5–10.1)
CHLORIDE SERPL-SCNC: 117 MMOL/L — HIGH (ref 96–108)
CO2 SERPL-SCNC: 23 MMOL/L — SIGNIFICANT CHANGE UP (ref 22–31)
CREAT SERPL-MCNC: 1.49 MG/DL — HIGH (ref 0.5–1.3)
EGFR: 48 ML/MIN/1.73M2 — LOW
GLUCOSE SERPL-MCNC: 100 MG/DL — HIGH (ref 70–99)
HCT VFR BLD CALC: 35.3 % — LOW (ref 39–50)
HGB BLD-MCNC: 11.2 G/DL — LOW (ref 13–17)
MCHC RBC-ENTMCNC: 29.2 PG — SIGNIFICANT CHANGE UP (ref 27–34)
MCHC RBC-ENTMCNC: 31.7 GM/DL — LOW (ref 32–36)
MCV RBC AUTO: 92.2 FL — SIGNIFICANT CHANGE UP (ref 80–100)
PLATELET # BLD AUTO: 172 K/UL — SIGNIFICANT CHANGE UP (ref 150–400)
POTASSIUM SERPL-MCNC: 4.6 MMOL/L — SIGNIFICANT CHANGE UP (ref 3.5–5.3)
POTASSIUM SERPL-SCNC: 4.6 MMOL/L — SIGNIFICANT CHANGE UP (ref 3.5–5.3)
RBC # BLD: 3.83 M/UL — LOW (ref 4.2–5.8)
RBC # FLD: 14.6 % — HIGH (ref 10.3–14.5)
SODIUM SERPL-SCNC: 143 MMOL/L — SIGNIFICANT CHANGE UP (ref 135–145)
TROPONIN I, HIGH SENSITIVITY RESULT: 10.24 NG/L — SIGNIFICANT CHANGE UP
TSH SERPL-MCNC: 2.23 UU/ML — SIGNIFICANT CHANGE UP (ref 0.34–4.82)
WBC # BLD: 7.3 K/UL — SIGNIFICANT CHANGE UP (ref 3.8–10.5)
WBC # FLD AUTO: 7.3 K/UL — SIGNIFICANT CHANGE UP (ref 3.8–10.5)

## 2023-05-07 RX ADMIN — AMLODIPINE BESYLATE 10 MILLIGRAM(S): 2.5 TABLET ORAL at 08:27

## 2023-05-07 RX ADMIN — ATORVASTATIN CALCIUM 40 MILLIGRAM(S): 80 TABLET, FILM COATED ORAL at 20:55

## 2023-05-07 RX ADMIN — LOSARTAN POTASSIUM 25 MILLIGRAM(S): 100 TABLET, FILM COATED ORAL at 08:27

## 2023-05-07 RX ADMIN — ENOXAPARIN SODIUM 40 MILLIGRAM(S): 100 INJECTION SUBCUTANEOUS at 20:58

## 2023-05-07 RX ADMIN — TAMSULOSIN HYDROCHLORIDE 0.4 MILLIGRAM(S): 0.4 CAPSULE ORAL at 20:55

## 2023-05-07 RX ADMIN — Medication 100 MILLIGRAM(S): at 08:28

## 2023-05-07 RX ADMIN — Medication 50 MILLIGRAM(S): at 08:27

## 2023-05-07 NOTE — CONSULT NOTE ADULT - ASSESSMENT
76 y/o male with h/o HTN, HLD, BPH, bladder CA, obesity, YO on CPAP admitted with syncope  SND-junctional rhythm, pt has been on metoprolol 50mg daily  76 y/o male with h/o HTN, HLD, BPH, bladder CA, obesity, YO on CPAP admitted with syncope    SND-junctional rhythm, and exacerbated with metoprolol 50mg daily and had URI  recurrent syncope during the time he has URI,   would stop bb indefinitely   cont telemetry if no sustained or profound bradycardia then no need for PPM  i recommend ILR vs mct on discharge    tte  weight loss, control HTN \    Thank you for involving our service in participating in the care of this patient

## 2023-05-07 NOTE — PROGRESS NOTE ADULT - ASSESSMENT
78 y/o male with h/o HTN, HLD, BPH, bladder CA, obesity, YO on CPAP was BIBA after syncopal episode.  Today, pt was at a nursery when he began to feel dizzy, leaned over a stool and passed out.     #Syncope, with sinus node dysfunction, junctional rhythm, in setting of BB  -tele  -orthostatics  -echo ordered  -hold Toprol and monitor HR  -f/u cards recs (outpt [place])  -appreciate EP input  -will likely not need ppm  -likely ILR vs MCOT on d/c    #URI 2/2 rhinovirus  -supportive care    #CKD  -trend    #DVT ppx- SQL    Possible d/c 5/8 if HR stable, echo and EP plan (MCOT vs ILR)

## 2023-05-07 NOTE — CONSULT NOTE ADULT - SUBJECTIVE AND OBJECTIVE BOX
CHIEF COMPLAINT:    HPI:  78 y/o male with h/o HTN, HLD, BPH, bladder CA, obesity, YO on CPAP was BIBA after syncopal episode.  Today, pt was at a nursery when he began to feel dizzy, leaned over a stool and passed out. Denies headstrike or injury. Pt states he has an upper respiratory infection and  has been taking some medication for it. States that he landed on his right knee, with right knee abrasion. Currently feels okay.  no prior episodes in the past.  Denies CP or SOB. +prior episodes over the last year, last in 10/2022 which he states workup was negative.  does endorse soboe which has been told is due to his weight, therefore has started mougaro this month.    cardiac cath done in 9/2022-  Non-obstructive coronary artery disease     In ED, tachycardic on admission. rvp- rhinovirus. cxr- small plerual effusion.  tele monitor- tachy-maikel with avg around 70s but drops  to 30s.   (06 May 2023 19:33)    Admitted for syncope. Reviewed symptoms as listed carlie.  No palpitations dyspnea chest pain prior to event.  LOC brief seconds to minutes no postepisode confusion.  no seizure like activity B/B incontinence.  Reports prior hx of lightheadness.    Reviewed OP records.  Had cath Aug '22 for persistent symptoms   of dyspnea -  nonobstructive CAD     PAST MEDICAL AND SURGICAL HISTORY:  PAST MEDICAL & SURGICAL HISTORY:  HTN (hypertension)      HLD (hyperlipidemia)      Bladder cancer  Dx 2013      Kidney stones      Neuropathy  left upper leg      Arthritis      Torn meniscus  left      Torn tendon  right biceps      Anxiety and depression      BPH (benign prostatic hyperplasia)      Bursitis of both hips      Morbid obesity      COVID-19 vaccine series completed  Completed 2/2021--Booster 9/2/2021      Mild mitral regurgitation      Previous back surgery  thoracic/lumbar x2---last 2011 fusion with instrumentation      H/O shoulder surgery  right shoulder tendon repair-2014      H/O knee surgery  arthroscopy does not recall which knee 15 yrs ago      H/O cystoscopy  TURB, BCG instillation x2 --2013, Bladder cancer      History of nephrolithotomy with removal of calculi  Staghorn calculus left kidney--2010      History of knee surgery  Left knee arthroscopy with debridement, 10/15/2021          ALLERGIES:  Allergies    No Known Allergies    Intolerances        SOCIAL HISTORY:  Social History:  non-smoker   lives at home with wife (06 May 2023 19:33)      FAMILY  HISTORY:  FAMILY HISTORY:  FH: heart disease (Mother)    FHx: lung cancer (Father)        MEDICATIONS:  OUTPATIENT:  Home Medications:  allopurinol 100 mg oral tablet: 1 tab(s) orally once a day   (06 May 2023 16:50)  amLODIPine 10 mg oral tablet: 1 tab(s) orally once a day (06 May 2023 16:51)  atorvastatin 40 mg oral tablet: 1 tab(s) orally once a day (06 May 2023 16:51)  Centrum Silver oral tablet: 1 tab(s) orally once a day (06 May 2023 16:51)  colchicine 0.6 mg oral tablet: 1 tab(s) orally once a day as needed for (06 May 2023 16:55)  ezetimibe 10 mg oral tablet: 1 tab(s) orally once a day (06 May 2023 16:50)  fluticasone 50 mcg/inh nasal spray: 2 spray(s) in each nostril once a day (in the evening) (06 May 2023 16:51)  losartan 50 mg oral tablet: 0.5 tab(s) orally once a day (06 May 2023 16:51)  Metoprolol Succinate ER 50 mg oral tablet, extended release: 1 tab(s) orally once a day (06 May 2023 16:50)  Mounjaro 2.5 mg/0.5 mL subcutaneous solution: 2.5 milligram(s) subcutaneously once a week on Thursday (06 May 2023 16:54)  Myrbetriq 50 mg oral tablet, extended release: 1 tab(s) orally once a day (06 May 2023 16:51)  tamsulosin 0.4 mg oral capsule: 1 cap(s) orally once a day (06 May 2023 16:51)  Tylenol 325 mg oral tablet: 2 tab(s) orally once a day as needed for (06 May 2023 16:55)      INPATIENT:  MEDICATIONS  (STANDING):  allopurinol 100 milliGRAM(s) Oral daily  amLODIPine   Tablet 10 milliGRAM(s) Oral daily  atorvastatin 40 milliGRAM(s) Oral at bedtime  enoxaparin Injectable 40 milliGRAM(s) SubCutaneous every 24 hours  losartan 25 milliGRAM(s) Oral daily  tamsulosin 0.4 milliGRAM(s) Oral at bedtime    MEDICATIONS  (PRN):    MEDICATIONS  (PRN):      REVIEW OF SYSTEMS:  ===============================  ===============================  CONSTITUTIONAL: No weakness, fevers or chills  EYES/ENT: No visual changes;  No vertigo or throat pain   NECK: No pain or stiffness  RESPIRATORY: No cough, wheezing, hemoptysis; No shortness of breath  CARDIOVASCULAR: No chest pain or palpitations  GASTROINTESTINAL: No abdominal or epigastric pain. No nausea, vomiting, or hematemesis;   No diarrhea or constipation. No melena or hematochezia.  GENITOURINARY: No dysuria, frequency or hematuria  NEUROLOGICAL: No numbness or weakness  SKIN: No itching, burning, rashes, or lesions   All other review of systems is negative unless indicated above    Vital Signs Last 24 Hrs  T(C): 36.4 (07 May 2023 08:16), Max: 36.7 (06 May 2023 21:11)  T(F): 97.5 (07 May 2023 08:16), Max: 98.1 (06 May 2023 21:11)  HR: 65 (07 May 2023 15:57) (65 - 89)  BP: 135/71 (07 May 2023 15:57) (123/94 - 156/69)  BP(mean): --  RR: 18 (07 May 2023 15:57) (18 - 18)  SpO2: 98% (07 May 2023 15:57) (94% - 98%)    Parameters below as of 07 May 2023 15:57  Patient On (Oxygen Delivery Method): room air        I&O's Summary      I&O's Detail      PHYSICAL EXAM:    Constitutional: NAD, awake and alert, well-developed  HEENT: PERR, EOMI,  No oral cyananosis.  Neck:  supple,  No JVD  Respiratory: Breath sounds are clear bilaterally, No wheezing, rales or rhonchi  Cardiovascular: S1 and S2, regular rate and rhythm, no Murmurs, gallops or rubs  Gastrointestinal: Bowel Sounds present, soft, nontender.   Extremities: No peripheral edema. No clubbing or cyanosis.  Vascular: 2+ peripheral pulses  Neurological: A/O x 3, no focal deficits  Musculoskeletal: no calf tenderness.  Skin: No rashes.    ===============================  ===============================  LABS:                         11.2   7.30  )-----------( 172      ( 07 May 2023 12:01 )             35.3                         10.9   7.51  )-----------( 171      ( 06 May 2023 13:07 )             33.5     07 May 2023 12:01    143    |  117    |  37     ----------------------------<  100    4.6     |  23     |  1.49   06 May 2023 13:07    143    |  117    |  38     ----------------------------<  116    4.6     |  20     |  1.65     Ca    9.3        07 May 2023 12:01  Ca    8.6        06 May 2023 13:07    TPro  7.1    /  Alb  3.3    /  TBili  0.3    /  DBili  x      /  AST  23     /  ALT  26     /  AlkPhos  74     06 May 2023 13:07    PT/INR - ( 06 May 2023 13:07 )   PT: 12.4 sec;   INR: 1.07 ratio         PTT - ( 06 May 2023 13:07 )  PTT:25.5 sec    THYROID STUDIES:    ===============================  ===============================  CARDIAC BIOMARKERS:  -------  -BNP VALUES:    -------  -TROPONIN VALUES:   Troponin I, High Sensitivity Result: 10.24 ng/L (05-07-23 @ 12:01)  Troponin I, High Sensitivity Result: 9.86 ng/L (05-06-23 @ 13:07)  Troponin I, High Sensitivity Result: 18.96 ng/L (05-13-22 @ 20:54)  Troponin I, High Sensitivity Result: 18.65 ng/L (05-13-22 @ 18:43)  Troponin I, High Sensitivity Result: 22.38 ng/L (05-13-22 @ 15:38)  Troponin I, Serum: <0.015 ng/mL [0.015 - 0.045] (05-19-21 @ 22:28)  Troponin I, Serum: <0.015 ng/mL [0.015 - 0.045] (05-19-21 @ 14:46)  Troponin I, Serum: <0.015 ng/mL [0.015 - 0.045] (05-19-21 @ 12:36)  Troponin I, Serum: <0.015 ng/mL [0.015 - 0.045] (12-03-19 @ 07:43)  Troponin I, Serum: <0.015 ng/mL [0.015 - 0.045] (12-02-19 @ 20:05)      ===============================  ===============================  EKG: NSR no ischemic changes.    Tele: junctional rhythm sinus rhythm in 30s duration 3 seconds  occuring every 30 minutes. mulitiple episodes.     Impression     Summary     Estimated left ventricular ejection fraction is 55-60 %.   The left ventricle is normal in size, wall thickness, wall motion and   contractility as seen in limited views.   The left atrium is moderately dilated.   Mild to Moderate mitral regurgitation     Signature     ----------------------------------------------------------------   Electronically signed by Jean Maza MD(Interpreting   physician) on 05/20/2021 06:47 PM   ----------------------------------------------------------------     Angiographic Findings     Cardiac Arteries and Lesion Findings    LMCA: Mild atherosclerosis    LAD: Diffuse mild to moderate atherosclerosis    LCx:     Prox CX: 40% stenosis.    RCA:     Prox RCA: 40% stenosis.     Impression     Diagnostic Conclusions     Non-obstructive coronary artery disease with filling pressures within   normal limits and adequate perfusion.     Recommendations     Recommend aggressive medical management for CAD as per ACC/AHA   guidelines.   Findings relayed to patient and patient's cardiologist.    Estimated Blood Loss:5ml.    Complications:  No complication.     Signatures     ----------------------------------------------------------------   Electronically signed by Cheryl Chaparro(Performing Physician)   on 09/07/2022 17:30   ----------------------------------------------------------------

## 2023-05-07 NOTE — CONSULT NOTE ADULT - PROBLEM SELECTOR RECOMMENDATION 9
-bradycardia on monitor in setting of BB therapy.    -EP note reviewed. Agree w/ d/cing BB  & possible MCOT v. ILR on d/c  -check ortho statics  -Check echo.

## 2023-05-07 NOTE — CONSULT NOTE ADULT - SUBJECTIVE AND OBJECTIVE BOX
Patient is a 77y old  Male who presents with a chief complaint of syncope (06 May 2023 19:33)      HPI:  78 y/o male with h/o HTN, HLD, BPH, bladder CA, obesity, YO on CPAP was BIBA after syncopal episode.  Today, pt was at a nursery when he began to feel dizzy, leaned over a stool and passed out. Denies headstrike or injury. Pt states he has an upper respiratory infection and  has been taking some medication for it. States that he landed on his right knee, with right knee abrasion. Currently feels okay.  no prior episodes in the past.  Denies CP or SOB. +prior episodes over the last year, last in 10/2022 which he states workup was negative.  does endorse soboe which has been told is due to his weight, therefore has started mougaro this month.    cardiac cath done in 9/2022-  Non-obstructive coronary artery disease   In ED, tachycardic on admission. rvp- rhinovirus. cxr- small plerual effusion.  tele monitor- maikel with avg around 70s but drops  to 30s.   (06 May 2023 19:33)  Pt seen and examined     EKG:    TELEMETRY: SR, SND with frequent stable junctional rhythm to 30  bpm brief,     CARDIAC TESTS:  < from: TTE Echo Complete w/o Contrast w/ Doppler (05.20.21 @ 12:27) >  Impression     Summary     Estimated left ventricular ejection fraction is 55-60 %.   The left ventricle is normal in size, wall thickness, wall motion and   contractility as seen in limited views.   The left atrium is moderately dilated.   Mild to Moderate mitral regurgitation    < end of copied text >      PAST MEDICAL & SURGICAL HISTORY:  HTN (hypertension)      HLD (hyperlipidemia)      Bladder cancer  Dx 2013      Kidney stones      Neuropathy  left upper leg      Arthritis      Torn meniscus  left      Torn tendon  right biceps      Anxiety and depression      BPH (benign prostatic hyperplasia)      Bursitis of both hips      Morbid obesity      COVID-19 vaccine series completed  Completed 2/2021--Booster 9/2/2021      Mild mitral regurgitation      Previous back surgery  thoracic/lumbar x2---last 2011 fusion with instrumentation      H/O shoulder surgery  right shoulder tendon repair-2014      H/O knee surgery  arthroscopy does not recall which knee 15 yrs ago      H/O cystoscopy  TURB, BCG instillation x2 --2013, Bladder cancer      History of nephrolithotomy with removal of calculi  Staghorn calculus left kidney--2010      History of knee surgery  Left knee arthroscopy with debridement, 10/15/2021          MEDICATIONS  (STANDING):  allopurinol 100 milliGRAM(s) Oral daily  amLODIPine   Tablet 10 milliGRAM(s) Oral daily  atorvastatin 40 milliGRAM(s) Oral at bedtime  enoxaparin Injectable 40 milliGRAM(s) SubCutaneous every 24 hours  losartan 25 milliGRAM(s) Oral daily  tamsulosin 0.4 milliGRAM(s) Oral at bedtime    MEDICATIONS  (PRN):      FAMILY HISTORY:  FH: heart disease (Mother)    FHx: lung cancer (Father)      SOCIAL HISTORY: Denies tobacco, etoh abuse or illicit drug use      REVIEW OF SYSTEMS:    CONSTITUTIONAL:  As per HPI.    T(C): 36.4 (05-07-23 @ 08:16), Max: 36.8 (05-06-23 @ 12:37)  HR: 71 (05-07-23 @ 08:16) (71 - 112)  BP: 151/63 (05-07-23 @ 08:16) (123/94 - 156/69)  RR: 18 (05-07-23 @ 08:16) (17 - 18)  SpO2: 97% (05-07-23 @ 08:16) (94% - 99%)    PHYSICAL EXAMINATION:    GENERAL APPEARANCE:  Pt. is not currently dyspneic, in no distress. Pt. is alert, oriented, and pleasant.  HEENT:  Pupils are normal and react normally. No icterus. Mucous membranes well colored.  NECK:  Supple. No lymphadenopathy. Jugular venous pressure not elevated. Carotids equal.   HEART:   regular rate and rhythm. There are no murmurs, rubs or gallops noted  CHEST:  Chest is clear to auscultation. Normal respiratory effort.  ABDOMEN:  Soft and nontender.   EXTREMITIES:  There is no edema, warm and dry.      I&O's Summary      LABS:                        10.9   7.51  )-----------( 171      ( 06 May 2023 13:07 )             33.5     05-06    143  |  117<H>  |  38<H>  ----------------------------<  116<H>  4.6   |  20<L>  |  1.65<H>    Ca    8.6      06 May 2023 13:07    TPro  7.1  /  Alb  3.3  /  TBili  0.3  /  DBili  x   /  AST  23  /  ALT  26  /  AlkPhos  74  05-06    LIVER FUNCTIONS - ( 06 May 2023 13:07 )  Alb: 3.3 g/dL / Pro: 7.1 gm/dL / ALK PHOS: 74 U/L / ALT: 26 U/L / AST: 23 U/L / GGT: x           PT/INR - ( 06 May 2023 13:07 )   PT: 12.4 sec;   INR: 1.07 ratio         PTT - ( 06 May 2023 13:07 )  PTT:25.5 sec          Thank you for involving our service in participating in the care of this patient  Patient is a 77y old  Male who presents with a chief complaint of syncope (06 May 2023 19:33)      HPI:  78 y/o male with h/o HTN, HLD, BPH, bladder CA, obesity, YO on CPAP was BIBA after syncopal episode.  Today, pt was at a nursery when he began to feel dizzy, leaned over a stool and passed out. Denies headstrike or injury. Pt states he has an upper respiratory infection and  has been taking some medication for it. States that he landed on his right knee, with right knee abrasion. Currently feels okay.  no prior episodes in the past.  Denies CP or SOB. +prior episodes over the last year, last in 10/2022 which he states workup was negative.  does endorse soboe which has been told is due to his weight, therefore has started mougaro this month.    cardiac cath done in 9/2022-  Non-obstructive coronary artery disease   In ED, tachycardic on admission. rvp- rhinovirus. cxr- small plerual effusion.  tele monitor- maikel with avg around 70s but drops  to 30s.   (06 May 2023 19:33)  Pt seen and examined he had recurrent syncope during episode of URI, prior cardiac work up as per pt was negative, denies cardiac disease     EKG: SR 68 bpm, LVH    TELEMETRY: SR, SND with frequent stable junctional rhythm to 30  bpm brief,     CARDIAC TESTS:  < from: TTE Echo Complete w/o Contrast w/ Doppler (05.20.21 @ 12:27) >  Impression     Summary     Estimated left ventricular ejection fraction is 55-60 %.   The left ventricle is normal in size, wall thickness, wall motion and   contractility as seen in limited views.   The left atrium is moderately dilated.   Mild to Moderate mitral regurgitation    < end of copied text >      PAST MEDICAL & SURGICAL HISTORY:  HTN (hypertension)      HLD (hyperlipidemia)      Bladder cancer  Dx 2013      Kidney stones      Neuropathy  left upper leg      Arthritis      Torn meniscus  left      Torn tendon  right biceps      Anxiety and depression      BPH (benign prostatic hyperplasia)      Bursitis of both hips      Morbid obesity      COVID-19 vaccine series completed  Completed 2/2021--Booster 9/2/2021      Mild mitral regurgitation      Previous back surgery  thoracic/lumbar x2---last 2011 fusion with instrumentation      H/O shoulder surgery  right shoulder tendon repair-2014      H/O knee surgery  arthroscopy does not recall which knee 15 yrs ago      H/O cystoscopy  TURB, BCG instillation x2 --2013, Bladder cancer      History of nephrolithotomy with removal of calculi  Staghorn calculus left kidney--2010      History of knee surgery  Left knee arthroscopy with debridement, 10/15/2021          MEDICATIONS  (STANDING):  allopurinol 100 milliGRAM(s) Oral daily  amLODIPine   Tablet 10 milliGRAM(s) Oral daily  atorvastatin 40 milliGRAM(s) Oral at bedtime  enoxaparin Injectable 40 milliGRAM(s) SubCutaneous every 24 hours  losartan 25 milliGRAM(s) Oral daily  tamsulosin 0.4 milliGRAM(s) Oral at bedtime    MEDICATIONS  (PRN):      FAMILY HISTORY:  FH: heart disease (Mother)    FHx: lung cancer (Father)      SOCIAL HISTORY: Denies tobacco, etoh abuse or illicit drug use      REVIEW OF SYSTEMS:    CONSTITUTIONAL:  As per HPI.    T(C): 36.4 (05-07-23 @ 08:16), Max: 36.8 (05-06-23 @ 12:37)  HR: 71 (05-07-23 @ 08:16) (71 - 112)  BP: 151/63 (05-07-23 @ 08:16) (123/94 - 156/69)  RR: 18 (05-07-23 @ 08:16) (17 - 18)  SpO2: 97% (05-07-23 @ 08:16) (94% - 99%)    PHYSICAL EXAMINATION:    GENERAL APPEARANCE:  Pt. is not currently dyspneic, in no distress. Pt. is alert, oriented, and pleasant.  HEENT:  Pupils are normal and react normally. No icterus. Mucous membranes well colored.  NECK:  Supple. No lymphadenopathy. Jugular venous pressure not elevated. Carotids equal.   HEART:   regular rate and rhythm. There are no murmurs, rubs or gallops noted  CHEST:  Chest is clear to auscultation. Normal respiratory effort.  ABDOMEN:  Soft and nontender.   EXTREMITIES:  There is no edema, warm and dry.      I&O's Summary      LABS:                        10.9   7.51  )-----------( 171      ( 06 May 2023 13:07 )             33.5     05-06    143  |  117<H>  |  38<H>  ----------------------------<  116<H>  4.6   |  20<L>  |  1.65<H>    Ca    8.6      06 May 2023 13:07    TPro  7.1  /  Alb  3.3  /  TBili  0.3  /  DBili  x   /  AST  23  /  ALT  26  /  AlkPhos  74  05-06    LIVER FUNCTIONS - ( 06 May 2023 13:07 )  Alb: 3.3 g/dL / Pro: 7.1 gm/dL / ALK PHOS: 74 U/L / ALT: 26 U/L / AST: 23 U/L / GGT: x           PT/INR - ( 06 May 2023 13:07 )   PT: 12.4 sec;   INR: 1.07 ratio         PTT - ( 06 May 2023 13:07 )  PTT:25.5 sec

## 2023-05-08 ENCOUNTER — TRANSCRIPTION ENCOUNTER (OUTPATIENT)
Age: 78
End: 2023-05-08

## 2023-05-08 VITALS
TEMPERATURE: 99 F | DIASTOLIC BLOOD PRESSURE: 60 MMHG | HEART RATE: 79 BPM | SYSTOLIC BLOOD PRESSURE: 152 MMHG | OXYGEN SATURATION: 96 % | RESPIRATION RATE: 18 BRPM

## 2023-05-08 DIAGNOSIS — R55 SYNCOPE AND COLLAPSE: ICD-10-CM

## 2023-05-08 PROCEDURE — 73120 X-RAY EXAM OF HAND: CPT | Mod: RT

## 2023-05-08 PROCEDURE — 99239 HOSP IP/OBS DSCHRG MGMT >30: CPT

## 2023-05-08 PROCEDURE — 99233 SBSQ HOSP IP/OBS HIGH 50: CPT

## 2023-05-08 PROCEDURE — 73120 X-RAY EXAM OF HAND: CPT | Mod: 26,RT

## 2023-05-08 PROCEDURE — 93306 TTE W/DOPPLER COMPLETE: CPT

## 2023-05-08 PROCEDURE — 93306 TTE W/DOPPLER COMPLETE: CPT | Mod: 26

## 2023-05-08 PROCEDURE — 33285 INSJ SUBQ CAR RHYTHM MNTR: CPT

## 2023-05-08 RX ORDER — COLCHICINE 0.6 MG
1 TABLET ORAL
Refills: 0 | DISCHARGE

## 2023-05-08 RX ORDER — METOPROLOL TARTRATE 50 MG
1 TABLET ORAL
Qty: 0 | Refills: 0 | DISCHARGE

## 2023-05-08 RX ORDER — TIRZEPATIDE 15 MG/.5ML
2.5 INJECTION, SOLUTION SUBCUTANEOUS
Refills: 0 | DISCHARGE

## 2023-05-08 RX ORDER — ACETAMINOPHEN 500 MG
2 TABLET ORAL
Qty: 0 | Refills: 0 | DISCHARGE

## 2023-05-08 RX ADMIN — Medication 100 MILLIGRAM(S): at 10:35

## 2023-05-08 RX ADMIN — LOSARTAN POTASSIUM 25 MILLIGRAM(S): 100 TABLET, FILM COATED ORAL at 10:36

## 2023-05-08 RX ADMIN — AMLODIPINE BESYLATE 10 MILLIGRAM(S): 2.5 TABLET ORAL at 10:36

## 2023-05-08 NOTE — DISCHARGE NOTE PROVIDER - NSDCCAREPROVSEEN_GEN_ALL_CORE_FT
Kaity Ji (Cardiac Electrophysiology)  Sebastian Leblanc (Cardiac Electrophysiology)  Jean Maza (Cardiology)  Fadia Butler (Cardiology)  Chandler Laura (Hospital Medicine)  Chandler Myles ([facility] Medicine)

## 2023-05-08 NOTE — PROCEDURE NOTE - NSASSISTBY_GEN_A_CORE
Pre-Visit Chart Review  For Appointment Scheduled on 3/15/17    Health Maintenance Due   Topic Date Due    CHLAMYDIA SCREENING  04/21/2012    Influenza Vaccine  08/01/2016                     
NP

## 2023-05-08 NOTE — DISCHARGE NOTE PROVIDER - HOSPITAL COURSE
78 y/o man with HTN, HLD, YO on CPAP, bladder cancer, BPH, here for further evaluation after an episode of syncope. Patient was at a plant nursery when he began to feel dizzy, leaned over a stool and passed out. Brief in duration. No post ictal state/confusion. Had similar past syncopal episodes. Placed on tele here and admitted to Medicine.     Syncope  Found to have bradycardia, in setting of beta blocker therapy. Appreciate input from Cardiology and Cardiac Electrophysiology. Beta blocker discontinued, indefinitely. HR now improved and patient feeling well. TTE done. Confirmed normal heart size and function, no significant valvular disease. EP placed ILR today. Stable for DC home. Outpatient follow up with Dr Wolf and Dr Leblanc.     URI due to Rhinovirus  As confirmed here on resp pathogens PCR testing. Feeling improved with supportive care.       Discharge Exam:  Afebrile  BP 130s-150s/50s-60s  HR 70s  RR 16-18  O2 97-99% on RA  Gen: Comfortable appearing  HEENT: NCAT PERRL EOMI MMM clear oropharynx  Neck: Supple, no carotid bruits, no palpable lymph nodes, no thyromegaly, no JVD  CVS: +S1, +S2, regular, rate WNL  Chest: Normal resp effort, lungs clear to auscultation bilaterally  Abd: Soft, non-tender, non-distended, normal BS  Ext: No clubbing, no cyanosis, no edema, no tenderness, no erythema, B/L DP pulses equal and symmetric    Skin: Warm, dry  Neuro: AAOX3, no focal neurological deficits, follows all commands, able to move extremities spontaneously  Mood: Calm and pleasant    Labs:                        11.2   7.30  )--------( 172             35.3       143  |  117  |  37  ------------------------<  100  4.6   |   23   |  1.49    Ca    9.3      07 May 2023 12:01    TPro  7.1  /  Alb  3.3  /  TBili  0.3  /  DBili  x   /  AST  23  /  ALT  26  /  AlkPhos  74      PT: 12.4 sec;   INR: 1.07 ratio  PTT: 25.5 sec    Troponin negative     Micro:  Entero/rhinovirus (+) on resp pathogens PCR  COVID19 PCR negative    Imaging:  R hand XR 5/8/23: Arthritic changes. No fracture.     CXR 5/6/23: small L effusion    Cardiac Testing:  TTE 5/8/23:  Estimated left ventricular ejection fraction is 55-60 %. The left ventricle is normal in size, wall thickness, wall motion and contractility as seen in limited views. The left atrium is moderately dilated. The aortic valve is trileaflet with thin pliable leaflets. The mitral valve leaflets appear thin and normal. Mild mitral annular calcification is present. Mild to Moderate mitral regurgitation is present. EA reversal of the mitral inflow consistent with reduced compliance of the left ventricle. No evidence of pericardial effusion.    Tele 5/8/23: NSR rate 50s-70s    Tele 5/7/23: Bigmeny, then to 30s, blocked PACs, jxnl escape

## 2023-05-08 NOTE — DISCHARGE NOTE NURSING/CASE MANAGEMENT/SOCIAL WORK - PATIENT PORTAL LINK FT
You can access the FollowMyHealth Patient Portal offered by [facility] by registering at the following website: [email or web address]. By joining [facility]’s FollowMyHealth portal, you will also be able to view your health information using other applications (apps) compatible with our system.

## 2023-05-08 NOTE — DISCHARGE NOTE PROVIDER - NSDCCPCAREPLAN_GEN_ALL_CORE_FT
PRINCIPAL DISCHARGE DIAGNOSIS  Diagnosis: Syncope  Assessment and Plan of Treatment: You were treated in the hospital after an episode of syncope, found to have bradycardia, albeit in setting of beta blocker therapy. Appreciate input from Cardiology and Cardiac Electrophysiology. Beta blocker (metoprolol) discontinued indefinitely. Heart rate and rhythm now improved and you are feeling well. Echocardiogram done. Confirmed normal heart size and function, no significant valvular disease. Today, Cardiac Electrophysiology placed a loop recorder (cardiac arrhythmia monitor). You are now stable for dicharge home. Outpatient follow up with Dr Wolf and Dr Leblanc.      SECONDARY DISCHARGE DIAGNOSES  Diagnosis: Rhinovirus  Assessment and Plan of Treatment: As confirmed here on resp pathogens PCR testing. Feeling improved with supportive care.

## 2023-05-08 NOTE — DISCHARGE NOTE PROVIDER - CARE PROVIDERS DIRECT ADDRESSES
[email or web address]

## 2023-05-08 NOTE — PROGRESS NOTE ADULT - SUBJECTIVE AND OBJECTIVE BOX
HOSPITALIST ATTENDING PROGRESS NOTE    Chart and meds reviewed.  Patient seen and examined.    CC: syncope    Subjective: Denies CP, SOB, palp.       All other systems reviewed and found to be negative with the exception of what has been described above.    MEDICATIONS  (STANDING):  allopurinol 100 milliGRAM(s) Oral daily  amLODIPine   Tablet 10 milliGRAM(s) Oral daily  atorvastatin 40 milliGRAM(s) Oral at bedtime  enoxaparin Injectable 40 milliGRAM(s) SubCutaneous every 24 hours  losartan 25 milliGRAM(s) Oral daily  tamsulosin 0.4 milliGRAM(s) Oral at bedtime    MEDICATIONS  (PRN):      VITALS:  T(F): 97.5 (05-07-23 @ 08:16), Max: 98.1 (05-06-23 @ 21:11)  HR: 71 (05-07-23 @ 08:16) (71 - 89)  BP: 151/63 (05-07-23 @ 08:16) (123/94 - 156/69)  RR: 18 (05-07-23 @ 08:16) (17 - 18)  SpO2: 97% (05-07-23 @ 08:16) (94% - 97%)  Wt(kg): --    I&O's Summary      CAPILLARY BLOOD GLUCOSE          PHYSICAL EXAM:  Gen: NAD  HEENT:  pupils equal and reactive, EOMI, no oropharyngeal lesions, erythema, exudates, oral thrush  NECK:   supple, no carotid bruits, no palpable lymph nodes, no thyromegaly  CV:  +S1, +S2, regular, no murmurs or rubs  RESP:   lungs clear to auscultation bilaterally, no wheezing, rales, rhonchi, good air entry bilaterally  BREAST:  not examined  GI:  abdomen soft, non-tender, non-distended, normal BS, no bruits, no abdominal masses, no palpable masses  RECTAL:  not examined  :  not examined  MSK:   normal muscle tone, no atrophy, no rigidity, no contractions  EXT:  no clubbing, no cyanosis, no edema, no calf pain, swelling or erythema  VASCULAR:  pulses equal and symmetric in the upper and lower extremities  NEURO:  AAOX3, no focal neurological deficits, follows all commands, able to move extremities spontaneously  SKIN:  no ulcers, lesions or rashes    LABS:                            11.2   7.30  )-----------( 172      ( 07 May 2023 12:01 )             35.3     05-07    143  |  117<H>  |  37<H>  ----------------------------<  100<H>  4.6   |  23  |  1.49<H>    Ca    9.3      07 May 2023 12:01    TPro  7.1  /  Alb  3.3  /  TBili  0.3  /  DBili  x   /  AST  23  /  ALT  26  /  AlkPhos  74  05-06        LIVER FUNCTIONS - ( 06 May 2023 13:07 )  Alb: 3.3 g/dL / Pro: 7.1 gm/dL / ALK PHOS: 74 U/L / ALT: 26 U/L / AST: 23 U/L / GGT: x           PT/INR - ( 06 May 2023 13:07 )   PT: 12.4 sec;   INR: 1.07 ratio         PTT - ( 06 May 2023 13:07 )  PTT:25.5 sec    CULTURES:  no new    Additional results/Imaging, I have personally reviewed:  CXR 5/6/23: small L effusion    Telemetry, personally reviewed:  5/7/23: bigmeny, then to 30s, blocked PACs, jxnl escape
CHIEF COMPLAINT:    HPI:  78 y/o male with h/o HTN, HLD, BPH, bladder CA, obesity, YO on CPAP was BIBA after syncopal episode.  Today, pt was at a nursery when he began to feel dizzy, leaned over a stool and passed out. Denies headstrike or injury. Pt states he has an upper respiratory infection and  has been taking some medication for it. States that he landed on his right knee, with right knee abrasion. Currently feels okay.  no prior episodes in the past.  Denies CP or SOB. +prior episodes over the last year, last in 10/2022 which he states workup was negative.  does endorse soboe which has been told is due to his weight, therefore has started mougaro this month.    cardiac cath done in 9/2022-  Non-obstructive coronary artery disease     In ED, tachycardic on admission. rvp- rhinovirus. cxr- small plerual effusion.  tele monitor- tachy-maikel with avg around 70s but drops  to 30s.   (06 May 2023 19:33)    Admitted for syncope. Reviewed symptoms as listed carlie.  No palpitations dyspnea chest pain prior to event.  LOC brief seconds to minutes no postepisode confusion.  no seizure like activity B/B incontinence.  Reports prior hx of lightheadness.    Reviewed OP records.  Had cath Aug '22 for persistent symptoms   of dyspnea -  nonobstructive CAD     5/8/23: no cardiac complaints: tyele: NSR 70-80; APCs, Bigeminy     MEDICATIONS:  OUTPATIENT:  Home Medications:  allopurinol 100 mg oral tablet: 1 tab(s) orally once a day   (06 May 2023 16:50)  amLODIPine 10 mg oral tablet: 1 tab(s) orally once a day (06 May 2023 16:51)  atorvastatin 40 mg oral tablet: 1 tab(s) orally once a day (06 May 2023 16:51)  Centrum Silver oral tablet: 1 tab(s) orally once a day (06 May 2023 16:51)  colchicine 0.6 mg oral tablet: 1 tab(s) orally once a day as needed for (06 May 2023 16:55)  ezetimibe 10 mg oral tablet: 1 tab(s) orally once a day (06 May 2023 16:50)  fluticasone 50 mcg/inh nasal spray: 2 spray(s) in each nostril once a day (in the evening) (06 May 2023 16:51)  losartan 50 mg oral tablet: 0.5 tab(s) orally once a day (06 May 2023 16:51)  Metoprolol Succinate ER 50 mg oral tablet, extended release: 1 tab(s) orally once a day (06 May 2023 16:50)  Mounjaro 2.5 mg/0.5 mL subcutaneous solution: 2.5 milligram(s) subcutaneously once a week on Thursday (06 May 2023 16:54)  Myrbetriq 50 mg oral tablet, extended release: 1 tab(s) orally once a day (06 May 2023 16:51)  tamsulosin 0.4 mg oral capsule: 1 cap(s) orally once a day (06 May 2023 16:51)  Tylenol 325 mg oral tablet: 2 tab(s) orally once a day as needed for (06 May 2023 16:55)    MEDICATIONS  (STANDING):  allopurinol 100 milliGRAM(s) Oral daily  amLODIPine   Tablet 10 milliGRAM(s) Oral daily  atorvastatin 40 milliGRAM(s) Oral at bedtime  enoxaparin Injectable 40 milliGRAM(s) SubCutaneous every 24 hours  losartan 25 milliGRAM(s) Oral daily  tamsulosin 0.4 milliGRAM(s) Oral at bedtime    Vital Signs Last 24 Hrs  T(C): 36.8 (08 May 2023 12:09), Max: 37.3 (07 May 2023 20:30)  T(F): 98.3 (08 May 2023 12:09), Max: 99.2 (07 May 2023 20:30)  HR: 79 (08 May 2023 12:09) (65 - 79)  BP: 167/72 (08 May 2023 12:09) (135/71 - 167/72)  BP(mean): --  RR: 18 (08 May 2023 12:09) (18 - 19)  SpO2: 100% (08 May 2023 12:09) (93% - 100%)    Parameters below as of 08 May 2023 07:21  Patient On (Oxygen Delivery Method): room air      PHYSICAL EXAM:    Constitutional: NAD, awake and alert, well-developed  HEENT: PERR, EOMI,  No oral cyananosis.  Neck:  supple,  No JVD  Respiratory: Breath sounds are clear bilaterally, No wheezing, rales or rhonchi  Cardiovascular: S1 and S2, regular rate and rhythm, no Murmurs, gallops or rubs  Gastrointestinal: Bowel Sounds present, soft, nontender.   Extremities: No peripheral edema. No clubbing or cyanosis.  Vascular: 2+ peripheral pulses  Neurological: A/O x 3, no focal deficits  Musculoskeletal: no calf tenderness.  Skin: No rashes.    ===============================  ===============================  LABS:                       11.2   7.30  )-----------( 172      ( 07 May 2023 12:01 )             35.3     05-07    143  |  117<H>  |  37<H>  ----------------------------<  100<H>  4.6   |  23  |  1.49<H>    Ca    9.3      07 May 2023 12:01    TPro  7.1  /  Alb  3.3  /  TBili  0.3  /  DBili  x   /  AST  23  /  ALT  26  /  AlkPhos  74  05-06    - TroponinI hsT: <-10.24, <-9.86                         11.2   7.30  )-----------( 172      ( 07 May 2023 12:01 )             35.3                         10.9   7.51  )-----------( 171      ( 06 May 2023 13:07 )             33.5     07 May 2023 12:01    143    |  117    |  37     ----------------------------<  100    4.6     |  23     |  1.49   06 May 2023 13:07    143    |  117    |  38     ----------------------------<  116    4.6     |  20     |  1.65     Ca    9.3        07 May 2023 12:01  Ca    8.6        06 May 2023 13:07    TPro  7.1    /  Alb  3.3    /  TBili  0.3    /  DBili  x      /  AST  23     /  ALT  26     /  AlkPhos  74     06 May 2023 13:07    PT/INR - ( 06 May 2023 13:07 )   PT: 12.4 sec;   INR: 1.07 ratio         PTT - ( 06 May 2023 13:07 )  PTT:25.5 sec    THYROID STUDIES:    ===============================  ===============================  CARDIAC BIOMARKERS:  -------  -BNP VALUES:    -------  -TROPONIN VALUES:   Troponin I, High Sensitivity Result: 10.24 ng/L (05-07-23 @ 12:01)  Troponin I, High Sensitivity Result: 9.86 ng/L (05-06-23 @ 13:07)  Troponin I, High Sensitivity Result: 18.96 ng/L (05-13-22 @ 20:54)  Troponin I, High Sensitivity Result: 18.65 ng/L (05-13-22 @ 18:43)  Troponin I, High Sensitivity Result: 22.38 ng/L (05-13-22 @ 15:38)  Troponin I, Serum: <0.015 ng/mL [0.015 - 0.045] (05-19-21 @ 22:28)  Troponin I, Serum: <0.015 ng/mL [0.015 - 0.045] (05-19-21 @ 14:46)  Troponin I, Serum: <0.015 ng/mL [0.015 - 0.045] (05-19-21 @ 12:36)  Troponin I, Serum: <0.015 ng/mL [0.015 - 0.045] (12-03-19 @ 07:43)  Troponin I, Serum: <0.015 ng/mL [0.015 - 0.045] (12-02-19 @ 20:05)      ===============================  ===============================  EKG: NSR no ischemic changes.    Tele: junctional rhythm sinus rhythm in 30s duration 3 seconds  occuring every 30 minutes. mulitiple episodes.     Impression     Summary     Estimated left ventricular ejection fraction is 55-60 %.   The left ventricle is normal in size, wall thickness, wall motion and   contractility as seen in limited views.   The left atrium is moderately dilated.   Mild to Moderate mitral regurgitation     Signature     ----------------------------------------------------------------   Electronically signed by Jean Maza MD(Interpreting   physician) on 05/20/2021 06:47 PM   ----------------------------------------------------------------     Angiographic Findings     Cardiac Arteries and Lesion Findings    LMCA: Mild atherosclerosis    LAD: Diffuse mild to moderate atherosclerosis    LCx:     Prox CX: 40% stenosis.    RCA:     Prox RCA: 40% stenosis.     Impression     Diagnostic Conclusions     Non-obstructive coronary artery disease with filling pressures within   normal limits and adequate perfusion.     Recommendations     Recommend aggressive medical management for CAD as per ACC/AHA   guidelines.   Findings relayed to patient and patient's cardiologist.    Estimated Blood Loss:5ml.    Complications:  No complication.     Signatures     ----------------------------------------------------------------   Electronically signed by Cheryl Chaparro(Performing Physician)   on 09/07/2022 17:30   ----------------------------------------------------------------

## 2023-05-08 NOTE — DISCHARGE NOTE PROVIDER - CARE PROVIDER_API CALL
Sunny Mariano)  Family Medicine; Geriatric Medicine  [address] [place] [address]  Phone: [phone]  Fax: [phone]  Follow Up Time: 2 weeks    Sebastian Wolf)  Cardiovascular Disease  [address] [facility] [address]  [place] [address]  Phone: [phone]  Fax: [phone]  Follow Up Time: 2 weeks    Sebastian Leblanc)  Cardiac Electrophysiology; Cardiovascular Disease  [address] [place] [address]  Phone: [phone]  Fax: [phone]  Scheduled Appointment: 05/24/2023

## 2023-05-08 NOTE — PROGRESS NOTE ADULT - PROBLEM SELECTOR PLAN 1
·  Problem: Syncope.   ·  Recommendation: -bradycardia on monitor in setting of BB therapy. - BB dced indefinitely, ILR today, EP recs noted ·  Problem: Syncope.   ·  Recommendation: -bradycardia on monitor in setting of BB therapy. - BB dced indefinitely,   Echo done, results pending, ILR today, EP recs noted

## 2023-05-08 NOTE — DISCHARGE NOTE PROVIDER - NSDCFUSCHEDAPPT_GEN_ALL_CORE_FT
[facility] Physician [address]  Scheduled Appointment: 05/24/2023

## 2023-05-09 ENCOUNTER — NON-APPOINTMENT (OUTPATIENT)
Age: 78
End: 2023-05-09

## 2023-05-16 DIAGNOSIS — J06.9 ACUTE UPPER RESPIRATORY INFECTION, UNSPECIFIED: ICD-10-CM

## 2023-05-16 DIAGNOSIS — N40.0 BENIGN PROSTATIC HYPERPLASIA WITHOUT LOWER URINARY TRACT SYMPTOMS: ICD-10-CM

## 2023-05-16 DIAGNOSIS — R00.1 BRADYCARDIA, UNSPECIFIED: ICD-10-CM

## 2023-05-16 DIAGNOSIS — E66.01 MORBID (SEVERE) OBESITY DUE TO EXCESS CALORIES: ICD-10-CM

## 2023-05-16 DIAGNOSIS — Z79.82 LONG TERM (CURRENT) USE OF ASPIRIN: ICD-10-CM

## 2023-05-16 DIAGNOSIS — Z85.51 PERSONAL HISTORY OF MALIGNANT NEOPLASM OF BLADDER: ICD-10-CM
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
## 2023-05-16 DIAGNOSIS — I25.10 ATHEROSCLEROTIC HEART DISEASE OF NATIVE CORONARY ARTERY WITHOUT ANGINA PECTORIS: ICD-10-CM

## 2023-05-16 DIAGNOSIS — G47.33 OBSTRUCTIVE SLEEP APNEA (ADULT) (PEDIATRIC): ICD-10-CM

## 2023-05-16 DIAGNOSIS — I10 ESSENTIAL (PRIMARY) HYPERTENSION: ICD-10-CM

## 2023-05-16 DIAGNOSIS — B34.8 OTHER VIRAL INFECTIONS OF UNSPECIFIED SITE: ICD-10-CM

## 2023-05-16 DIAGNOSIS — Z20.822 CONTACT WITH AND (SUSPECTED) EXPOSURE TO COVID-19: ICD-10-CM

## 2023-05-16 DIAGNOSIS — T44.7X5A ADVERSE EFFECT OF BETA-ADRENORECEPTOR ANTAGONISTS, INITIAL ENCOUNTER: ICD-10-CM

## 2023-05-18 ENCOUNTER — APPOINTMENT (OUTPATIENT)
Dept: CARDIOLOGY | Facility: CLINIC | Age: 78
End: 2023-05-18
Payer: MEDICARE

## 2023-05-18 VITALS
BODY MASS INDEX: 37.74 KG/M2 | HEART RATE: 83 BPM | SYSTOLIC BLOOD PRESSURE: 150 MMHG | WEIGHT: 249 LBS | OXYGEN SATURATION: 98 % | HEIGHT: 68 IN | DIASTOLIC BLOOD PRESSURE: 68 MMHG

## 2023-05-18 DIAGNOSIS — G47.33 OBSTRUCTIVE SLEEP APNEA (ADULT) (PEDIATRIC): ICD-10-CM

## 2023-05-18 PROCEDURE — 93000 ELECTROCARDIOGRAM COMPLETE: CPT

## 2023-05-18 PROCEDURE — 99214 OFFICE O/P EST MOD 30 MIN: CPT

## 2023-05-18 RX ORDER — ZOSTER VACCINE RECOMBINANT, ADJUVANTED 50 MCG/0.5
50 KIT INTRAMUSCULAR
Qty: 1 | Refills: 1 | Status: DISCONTINUED | COMMUNITY
Start: 2020-11-20 | End: 2023-05-18

## 2023-05-18 NOTE — DISCUSSION/SUMMARY
[EKG obtained to assist in diagnosis and management of assessed problem(s)] : EKG obtained to assist in diagnosis and management of assessed problem(s) [FreeTextEntry1] : Here today post hospital follow up presented with syncopal episode occurred in setting of URI  Rhino virus and bradycardia associated with the use  of BB therapy this was suspended and ILR was placed \par Echo reviewed NL LV FX no significant valvular disease, CC Non obstructive disease\par negative for orthostasis on exam today\par Will follow with EP for ILR monitoring\par carotid US ordered  \par \par CAD: Stable asymptomatic CW ASA/Statin \par \par CKD: Suggest renal follow up states he will call to schedule a visit \par \par Sleep apnea CW CPAP treatment \par \par HTN: Controlled with no orthostasis \par \par f/u 1 month \par \par \par

## 2023-05-18 NOTE — PHYSICAL EXAM
[Normal S1, S2] : normal S1, S2 [Soft] : abdomen soft [Non Tender] : non-tender [Normal Gait] : normal gait [No Edema] : no edema [Normal] : moves all extremities, no focal deficits, normal speech [Alert and Oriented] : alert and oriented [de-identified] : overweight

## 2023-05-18 NOTE — HISTORY OF PRESENT ILLNESS
[FreeTextEntry1] : 78 y/o male PMH:  HTN, HLD, BPH, bladder CA, CKD, obesity, YO on CPAP here today post hospital follow up was BIBA\par after syncopal episode.  Was at at a nursery when he began to feel dizzy, leaned over a bench and passed out this was in the setting of URI  due to Rhino virus,  this was a witnessed syncopal episode with a 5 second LOC no head  injury \par Denies  palpitations dyspnea chest pain prior to event.\par Monitor in ER showed bradycardia in setting of BB therapy    \par He is currently doing well no cardiovascular complaints \par \par \par Echo 5/8/23:  Estimated left ventricular ejection fraction is 55-60 %. The left\par ventricle is normal in size, wall thickness, wall motion and contractility as\par seen in limited views. The left atrium is moderately dilated. The aortic valve\par is trileaflet with thin pliable leaflets. The mitral valve leaflets appear thin\par and normal. Mild mitral annular calcification is present. Mild to Moderate\par mitral regurgitation is present. EA reversal of the mitral inflow consistent\par with reduced compliance of the left ventricle. No evidence of pericardial\par effusion.\par  \par Cardiac cath 9/2022 \par  \par LMCA: Mild atherosclerosis\par LAD: Diffuse mild to moderate atherosclerosis\par Prox CX: 40% stenosis.\par Prox RCA: 40% stenosis.\par  \par  Diagnostic Conclusions\par  \par  Non-obstructive coronary artery disease with filling pressures within\par  normal limits and adequate perfusion.\par  \par

## 2023-05-19 ENCOUNTER — APPOINTMENT (OUTPATIENT)
Dept: FAMILY MEDICINE | Facility: CLINIC | Age: 78
End: 2023-05-19
Payer: MEDICARE

## 2023-05-19 VITALS
BODY MASS INDEX: 36.68 KG/M2 | HEIGHT: 68 IN | HEART RATE: 89 BPM | TEMPERATURE: 97.7 F | WEIGHT: 242 LBS | SYSTOLIC BLOOD PRESSURE: 130 MMHG | DIASTOLIC BLOOD PRESSURE: 78 MMHG | RESPIRATION RATE: 16 BRPM | OXYGEN SATURATION: 97 %

## 2023-05-19 DIAGNOSIS — E66.9 OBESITY, UNSPECIFIED: ICD-10-CM

## 2023-05-19 DIAGNOSIS — M10.9 GOUT, UNSPECIFIED: ICD-10-CM

## 2023-05-19 DIAGNOSIS — R73.03 PREDIABETES.: ICD-10-CM

## 2023-05-19 DIAGNOSIS — E66.01 MORBID (SEVERE) OBESITY DUE TO EXCESS CALORIES: Chronic | ICD-10-CM

## 2023-05-19 PROCEDURE — 99214 OFFICE O/P EST MOD 30 MIN: CPT

## 2023-05-19 RX ORDER — COVID-19 MOLECULAR TEST ASSAY
KIT MISCELLANEOUS
Qty: 8 | Refills: 0 | Status: DISCONTINUED | COMMUNITY
Start: 2022-05-22 | End: 2023-05-19

## 2023-05-19 RX ORDER — IPRATROPIUM BROMIDE 21 UG/1
0.03 SPRAY NASAL
Qty: 30 | Refills: 0 | Status: DISCONTINUED | COMMUNITY
Start: 2022-05-11 | End: 2023-05-19

## 2023-05-19 RX ORDER — ATORVASTATIN CALCIUM 20 MG/1
20 TABLET, FILM COATED ORAL DAILY
Refills: 0 | Status: DISCONTINUED | COMMUNITY
End: 2023-05-19

## 2023-05-19 RX ORDER — TRIAMCINOLONE ACETONIDE 1 MG/G
0.1 OINTMENT TOPICAL TWICE DAILY
Qty: 80 | Refills: 1 | Status: DISCONTINUED | COMMUNITY
Start: 2020-03-22 | End: 2023-05-19

## 2023-05-19 NOTE — PHYSICAL EXAM
[No Acute Distress] : no acute distress [Well Developed] : well developed [Well-Appearing] : well-appearing [Normal Sclera/Conjunctiva] : normal sclera/conjunctiva [PERRL] : pupils equal round and reactive to light [Normal Outer Ear/Nose] : the outer ears and nose were normal in appearance [Normal Oropharynx] : the oropharynx was normal [No JVD] : no jugular venous distention [No Lymphadenopathy] : no lymphadenopathy [Supple] : supple [No Respiratory Distress] : no respiratory distress  [No Accessory Muscle Use] : no accessory muscle use [Normal Rate] : normal rate  [Regular Rhythm] : with a regular rhythm [No Murmur] : no murmur heard [No Edema] : there was no peripheral edema [Soft] : abdomen soft [Non Tender] : non-tender [Normal Supraclavicular Nodes] : no supraclavicular lymphadenopathy [Normal Posterior Cervical Nodes] : no posterior cervical lymphadenopathy [Normal Anterior Cervical Nodes] : no anterior cervical lymphadenopathy [No CVA Tenderness] : no CVA  tenderness [No Spinal Tenderness] : no spinal tenderness [Coordination Grossly Intact] : coordination grossly intact [No Focal Deficits] : no focal deficits [de-identified] : Morbid obesity [de-identified] : 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

## 2023-05-19 NOTE — ASSESSMENT
[FreeTextEntry1] : Patient generally doing well there has been some weight loss his vital signs are stable he saw cardiology yesterday their nurse practitioner felt he was stable he has a loop recorder that was been placed with no obvious abnormalities reported so far we will repeat his laboratory work in the hospital he did have a mild anemia perhaps secondary to his chronic kidney disease we will check iron studies we also will increase his Mounjaro to 5 mg weekly continue on his antihypertensive medication we reviewed his hospital records and consultation notes follow-up here in several months or as needed time of visit 30 minutes

## 2023-05-19 NOTE — HISTORY OF PRESENT ILLNESS
[FreeTextEntry1] : Syncope [de-identified] : Patient is seen here today in follow-up on a hospitalization for syncope which occurred while he was out shopping for plants it was not associated with any chest pain or new shortness of breath there were no tonic-clonic movements he awoke several seconds after the episode and was transported to the hospital where he was admitted observed and a loop recorder was placed they did find some episodes of brief bradycardia since that time the patient feels well also he is on Mounjaro for weight loss and sugar control which she is tolerating well he has been on the 2.5 mg dose now and is time to move him up to 5 mg he has lost 6 pounds on this

## 2023-05-19 NOTE — REVIEW OF SYSTEMS
[Fever] : no fever [Chills] : no chills [Fatigue] : no fatigue [Vision Problems] : no vision problems [Sore Throat] : no sore throat [Chest Pain] : no chest pain [Palpitations] : no palpitations [Lower Ext Edema] : no lower extremity edema [Orthopena] : no orthopnea [Shortness Of Breath] : no shortness of breath [Wheezing] : no wheezing [Cough] : no cough [Dyspnea on Exertion] : dyspnea on exertion [Abdominal Pain] : no abdominal pain [Nausea] : no nausea [Constipation] : no constipation [Diarrhea] : no diarrhea [Vomiting] : no vomiting [Heartburn] : no heartburn [Melena] : no melena [Dysuria] : no dysuria [Incontinence] : no incontinence [Hematuria] : no hematuria [Frequency] : frequency [Joint Pain] : joint pain [Back Pain] : back pain [Headache] : no headache [Dizziness] : no dizziness [Fainting] : fainting [Confusion] : no confusion [Unsteady Walk] : no ataxia [Memory Loss] : no memory loss

## 2023-05-23 ENCOUNTER — LABORATORY RESULT (OUTPATIENT)
Age: 78
End: 2023-05-23

## 2023-05-23 LAB
APPEARANCE: CLEAR
BILIRUBIN URINE: NEGATIVE
BLOOD URINE: NEGATIVE
COLOR: YELLOW
FERRITIN SERPL-MCNC: 224 NG/ML
GLUCOSE QUALITATIVE U: NEGATIVE MG/DL
KETONES URINE: NEGATIVE MG/DL
LEUKOCYTE ESTERASE URINE: NEGATIVE
NITRITE URINE: NEGATIVE
PH URINE: 5.5
PROTEIN URINE: 100 MG/DL
SPECIFIC GRAVITY URINE: 1.01
TSH SERPL-ACNC: 3.48 UIU/ML
UROBILINOGEN URINE: 0.2 MG/DL

## 2023-05-24 ENCOUNTER — APPOINTMENT (OUTPATIENT)
Dept: ELECTROPHYSIOLOGY | Facility: CLINIC | Age: 78
End: 2023-05-24
Payer: MEDICARE

## 2023-05-24 VITALS
DIASTOLIC BLOOD PRESSURE: 80 MMHG | RESPIRATION RATE: 16 BRPM | BODY MASS INDEX: 36.68 KG/M2 | WEIGHT: 242 LBS | HEIGHT: 68 IN | HEART RATE: 87 BPM | OXYGEN SATURATION: 96 % | SYSTOLIC BLOOD PRESSURE: 120 MMHG

## 2023-05-24 LAB
ALBUMIN SERPL ELPH-MCNC: 4.4 G/DL
ALP BLD-CCNC: 87 U/L
ALT SERPL-CCNC: 20 U/L
ANION GAP SERPL CALC-SCNC: 16 MMOL/L
AST SERPL-CCNC: 21 U/L
BILIRUB SERPL-MCNC: 0.3 MG/DL
BUN SERPL-MCNC: 41 MG/DL
CALCIUM SERPL-MCNC: 9.7 MG/DL
CHLORIDE SERPL-SCNC: 109 MMOL/L
CO2 SERPL-SCNC: 18 MMOL/L
CREAT SERPL-MCNC: 1.52 MG/DL
EGFR: 47 ML/MIN/1.73M2
ESTIMATED AVERAGE GLUCOSE: 111 MG/DL
GLUCOSE SERPL-MCNC: 102 MG/DL
HBA1C MFR BLD HPLC: 5.5 %
IRON SATN MFR SERPL: 28 %
IRON SERPL-MCNC: 86 UG/DL
POTASSIUM SERPL-SCNC: 5.2 MMOL/L
PROT SERPL-MCNC: 6.7 G/DL
SODIUM SERPL-SCNC: 144 MMOL/L
TIBC SERPL-MCNC: 303 UG/DL
UIBC SERPL-MCNC: 217 UG/DL

## 2023-05-24 PROCEDURE — 93285 PRGRMG DEV EVAL SCRMS IP: CPT

## 2023-05-24 PROCEDURE — 99212 OFFICE O/P EST SF 10 MIN: CPT

## 2023-05-24 NOTE — PHYSICAL EXAM
[Normal] : moves all extremities, no focal deficits, normal speech [de-identified] : Left mid sternal incision well healed and approximated

## 2023-05-24 NOTE — HISTORY OF PRESENT ILLNESS
[FreeTextEntry1] : 77 year old male with history HTN, HLD, DM, syncope who is s/p ILR implant.  Patient returns for follow up. Denies any CP, palptiations, SOB, dizziness, lightheadedness, near syncope or syncope.

## 2023-05-24 NOTE — ASSESSMENT
[FreeTextEntry1] : ILR interrogation reveals false AF episode c/w SR, false pause episode c/w baseline artifact, symptom episodes c/w NSR

## 2023-05-28 DIAGNOSIS — N18.30 TYPE 2 DIABETES MELLITUS WITH DIABETIC CHRONIC KIDNEY DISEASE: ICD-10-CM

## 2023-05-28 DIAGNOSIS — E11.22 TYPE 2 DIABETES MELLITUS WITH DIABETIC CHRONIC KIDNEY DISEASE: ICD-10-CM

## 2023-05-28 DIAGNOSIS — N18.4 HYPERTENSIVE CHRONIC KIDNEY DISEASE WITH STAGE 1 THROUGH STAGE 4 CHRONIC KIDNEY DISEASE, OR UNSPECIFIED CHRONIC KIDNEY DISEASE: ICD-10-CM

## 2023-05-28 DIAGNOSIS — N18.32 CHRONIC KIDNEY DISEASE, STAGE 3B: ICD-10-CM

## 2023-05-28 DIAGNOSIS — I12.9 HYPERTENSIVE CHRONIC KIDNEY DISEASE WITH STAGE 1 THROUGH STAGE 4 CHRONIC KIDNEY DISEASE, OR UNSPECIFIED CHRONIC KIDNEY DISEASE: ICD-10-CM

## 2023-05-29 DIAGNOSIS — D63.1 END STAGE RENAL DISEASE: ICD-10-CM

## 2023-05-29 DIAGNOSIS — Z99.2 END STAGE RENAL DISEASE: ICD-10-CM

## 2023-05-29 DIAGNOSIS — N18.6 END STAGE RENAL DISEASE: ICD-10-CM

## 2023-05-29 DIAGNOSIS — D64.9 ANEMIA, UNSPECIFIED: ICD-10-CM

## 2023-05-31 LAB
24R-OH-CALCIDIOL SERPL-MCNC: 45.8 PG/ML
25(OH)D3 SERPL-MCNC: 47.2 NG/ML
ALBUMIN SERPL ELPH-MCNC: 4.2 G/DL
ANION GAP SERPL CALC-SCNC: 16 MMOL/L
BUN SERPL-MCNC: 37 MG/DL
CALCIUM SERPL-MCNC: 9.1 MG/DL
CALCIUM SERPL-MCNC: 9.2 MG/DL
CHLORIDE SERPL-SCNC: 110 MMOL/L
CO2 SERPL-SCNC: 16 MMOL/L
CREAT SERPL-MCNC: 1.77 MG/DL
EGFR: 39 ML/MIN/1.73M2
GLUCOSE SERPL-MCNC: 104 MG/DL
PARATHYROID HORMONE INTACT: 42 PG/ML
PHOSPHATE SERPL-MCNC: 4.2 MG/DL
POTASSIUM SERPL-SCNC: 4.7 MMOL/L
SODIUM SERPL-SCNC: 143 MMOL/L

## 2023-06-07 ENCOUNTER — NON-APPOINTMENT (OUTPATIENT)
Age: 78
End: 2023-06-07

## 2023-06-07 LAB
ALBUMIN MFR SERPL ELPH: 57.1 %
ALBUMIN SERPL-MCNC: 3.7 G/DL
ALBUMIN/GLOB SERPL: 1.3 RATIO
ALBUPE: 65.5 %
ALPHA1 GLOB MFR SERPL ELPH: 4.3 %
ALPHA1 GLOB SERPL ELPH-MCNC: 0.3 G/DL
ALPHA1UPE: 11.2 %
ALPHA2 GLOB MFR SERPL ELPH: 14.1 %
ALPHA2 GLOB SERPL ELPH-MCNC: 0.9 G/DL
ALPHA2UPE: 7.7 %
B-GLOBULIN MFR SERPL ELPH: 11.9 %
B-GLOBULIN SERPL ELPH-MCNC: 0.8 G/DL
BENCE JONES EXCRETION: 0 MG/24HR
BETAUPE: 7.7 %
CREAT 24H UR-MCNC: 1.5 G/24 H
CREATININE UR (MAYO): 91 MG/DL
FREE KAPPA URINE: 134.45 MG/L
FREE KAPPA/LAMDA RATIO: 6.2 RATIO
FREE LAMDA URINE: 21.67 MG/L
GAMMA GLOB FLD ELPH-MCNC: 0.8 G/DL
GAMMA GLOB MFR SERPL ELPH: 12.6 %
GAMMAUPE: 7.9 %
IGA 24H UR QL IFE: NORMAL
INTERPRETATION SERPL IEP-IMP: NORMAL
KAPPA LC 24H UR QL: 0 MG/DL
M PROTEIN 24H MFR UR ELPH: 0 %
M PROTEIN SPEC IFE-MCNC: NORMAL
PROT ?TM UR-MCNC: 24 HR
PROT PATTERN 24H UR ELPH-IMP: NORMAL
PROT SERPL-MCNC: 6.5 G/DL
PROT SERPL-MCNC: 6.5 G/DL
PROT UR-MCNC: 64 MG/DL
PROT UR-MCNC: 64 MG/DL
SPECIMEN VOL 24H UR: 1700 ML
U PROTEIN QNT CALCULATION: 1088 MG/24 H

## 2023-06-11 ENCOUNTER — RX RENEWAL (OUTPATIENT)
Age: 78
End: 2023-06-11

## 2023-06-24 DIAGNOSIS — N50.82 SCROTAL PAIN: ICD-10-CM

## 2023-06-28 ENCOUNTER — NON-APPOINTMENT (OUTPATIENT)
Age: 78
End: 2023-06-28

## 2023-06-28 ENCOUNTER — APPOINTMENT (OUTPATIENT)
Dept: ELECTROPHYSIOLOGY | Facility: CLINIC | Age: 78
End: 2023-06-28
Payer: MEDICARE

## 2023-06-28 PROCEDURE — G2066: CPT

## 2023-06-28 PROCEDURE — 93298 REM INTERROG DEV EVAL SCRMS: CPT

## 2023-06-29 ENCOUNTER — TRANSCRIPTION ENCOUNTER (OUTPATIENT)
Age: 78
End: 2023-06-29

## 2023-06-29 ENCOUNTER — APPOINTMENT (OUTPATIENT)
Dept: CARDIOLOGY | Facility: CLINIC | Age: 78
End: 2023-06-29
Payer: MEDICARE

## 2023-06-29 VITALS
SYSTOLIC BLOOD PRESSURE: 148 MMHG | DIASTOLIC BLOOD PRESSURE: 60 MMHG | BODY MASS INDEX: 37.86 KG/M2 | OXYGEN SATURATION: 94 % | HEART RATE: 94 BPM | WEIGHT: 249 LBS

## 2023-06-29 DIAGNOSIS — I34.0 NONRHEUMATIC MITRAL (VALVE) INSUFFICIENCY: ICD-10-CM

## 2023-06-29 DIAGNOSIS — E88.81 METABOLIC SYNDROME: ICD-10-CM

## 2023-06-29 DIAGNOSIS — I10 ESSENTIAL (PRIMARY) HYPERTENSION: ICD-10-CM

## 2023-06-29 PROCEDURE — 99214 OFFICE O/P EST MOD 30 MIN: CPT

## 2023-06-29 PROCEDURE — 93000 ELECTROCARDIOGRAM COMPLETE: CPT

## 2023-06-29 NOTE — DISCUSSION/SUMMARY
[FreeTextEntry1] : Here today post hospital follow up presented with syncopal episode occurred in setting of URI  Rhino virus and bradycardia associated with the use  of BB therapy this was suspended and ILR was placed \par Echo reviewed NL LV FX no significant valvular disease, CC Non obstructive disease\par negative for orthostasis on exam today\par Will follow with EP for ILR monitoring\par carotid US ordered  \par \par CAD: Stable asymptomatic CW ASA/Statin \par \par CKD: Suggest renal follow up states he will call to schedule a visit \par \par Sleep apnea CW CPAP treatment \par \par HTN: Controlled with no orthostasis \par \par \par Follow up in 6 months.\par \par  [EKG obtained to assist in diagnosis and management of assessed problem(s)] : EKG obtained to assist in diagnosis and management of assessed problem(s)

## 2023-06-29 NOTE — PHYSICAL EXAM
[Normal S1, S2] : normal S1, S2 [Soft] : abdomen soft [Non Tender] : non-tender [Normal Gait] : normal gait [No Edema] : no edema [Normal] : moves all extremities, no focal deficits, normal speech [Alert and Oriented] : alert and oriented [de-identified] : overweight

## 2023-06-29 NOTE — HISTORY OF PRESENT ILLNESS
[FreeTextEntry1] : 76 y/o male PMH:  HTN, HLD, BPH, bladder CA, CKD, obesity, YO on CPAP here today post hospital follow up was BIBA\par after syncopal episode.  Was at at a nursery when he began to feel dizzy, leaned over a bench and passed out this was in the setting of URI  due to Rhino virus,  this was a witnessed syncopal episode with a 5 second LOC no head  injury \par Denies  palpitations dyspnea chest pain prior to event.\par Monitor in ER showed bradycardia in setting of BB therapy    \par He is currently doing well no cardiovascular complaints \par \par \par Echo 5/8/23:  Estimated left ventricular ejection fraction is 55-60 %. The left\par ventricle is normal in size, wall thickness, wall motion and contractility as\par seen in limited views. The left atrium is moderately dilated. The aortic valve\par is trileaflet with thin pliable leaflets. The mitral valve leaflets appear thin\par and normal. Mild mitral annular calcification is present. Mild to Moderate\par mitral regurgitation is present. EA reversal of the mitral inflow consistent\par with reduced compliance of the left ventricle. No evidence of pericardial\par effusion.\par  \par Cardiac cath 9/2022 \par  \par LMCA: Mild atherosclerosis\par LAD: Diffuse mild to moderate atherosclerosis\par Prox CX: 40% stenosis.\par Prox RCA: 40% stenosis.\par  \par  Diagnostic Conclusions\par  \par  Non-obstructive coronary artery disease with filling pressures within\par  normal limits and adequate perfusion.\par  \par

## 2023-07-03 ENCOUNTER — APPOINTMENT (OUTPATIENT)
Dept: FAMILY MEDICINE | Facility: CLINIC | Age: 78
End: 2023-07-03
Payer: MEDICARE

## 2023-07-03 VITALS
RESPIRATION RATE: 16 BRPM | DIASTOLIC BLOOD PRESSURE: 70 MMHG | WEIGHT: 246 LBS | SYSTOLIC BLOOD PRESSURE: 140 MMHG | OXYGEN SATURATION: 97 % | TEMPERATURE: 97.8 F | HEIGHT: 68 IN | HEART RATE: 97 BPM | BODY MASS INDEX: 37.28 KG/M2

## 2023-07-03 DIAGNOSIS — R53.83 OTHER FATIGUE: ICD-10-CM

## 2023-07-03 DIAGNOSIS — Z86.79 PERSONAL HISTORY OF OTHER DISEASES OF THE CIRCULATORY SYSTEM: ICD-10-CM

## 2023-07-03 DIAGNOSIS — L30.9 DERMATITIS, UNSPECIFIED: ICD-10-CM

## 2023-07-03 DIAGNOSIS — Z11.59 ENCOUNTER FOR SCREENING FOR OTHER VIRAL DISEASES: ICD-10-CM

## 2023-07-03 DIAGNOSIS — Z01.818 ENCOUNTER FOR OTHER PREPROCEDURAL EXAMINATION: ICD-10-CM

## 2023-07-03 DIAGNOSIS — Z23 ENCOUNTER FOR IMMUNIZATION: ICD-10-CM

## 2023-07-03 DIAGNOSIS — S83.207A UNSPECIFIED TEAR OF UNSPECIFIED MENISCUS, CURRENT INJURY, LEFT KNEE, INITIAL ENCOUNTER: ICD-10-CM

## 2023-07-03 DIAGNOSIS — S46.219A STRAIN OF MUSCLE, FASCIA AND TENDON OF OTHER PARTS OF BICEPS, UNSPECIFIED ARM, INITIAL ENCOUNTER: ICD-10-CM

## 2023-07-03 PROCEDURE — 99212 OFFICE O/P EST SF 10 MIN: CPT

## 2023-07-03 NOTE — PHYSICAL EXAM
[No Acute Distress] : no acute distress [Normal Sclera/Conjunctiva] : normal sclera/conjunctiva [Normal Outer Ear/Nose] : the outer ears and nose were normal in appearance [No Respiratory Distress] : no respiratory distress  [Normal Rate] : normal rate  [Regular Rhythm] : with a regular rhythm [de-identified] : These [de-identified] : Examination of the genitalia revealed the scrotum to be diffusely red there is no tenderness in the testicle or the epididymis or cord no sign of hernia

## 2023-07-03 NOTE — ASSESSMENT
[FreeTextEntry1] : Patient will be empirically treated with Mycostatin powder several times a day for a week if there is no improvement then a dermatological consultation will be obtained

## 2023-07-03 NOTE — HISTORY OF PRESENT ILLNESS
[FreeTextEntry8] : Patient here due to problem with a rash of his scrotum he was seen in an urgent care center in [place] and diagnosed with a possible dermatological issue or a epididymitis this continued he was empirically prescribed a Lotrimin cream which does not seem to have worked he continues with a diffuse drying type skin rash of the scrotum

## 2023-07-05 ENCOUNTER — RESULT REVIEW (OUTPATIENT)
Age: 78
End: 2023-07-05

## 2023-07-05 ENCOUNTER — APPOINTMENT (OUTPATIENT)
Dept: ULTRASOUND IMAGING | Facility: CLINIC | Age: 78
End: 2023-07-05
Payer: MEDICARE

## 2023-07-05 ENCOUNTER — OUTPATIENT (OUTPATIENT)
Dept: OUTPATIENT SERVICES | Facility: HOSPITAL | Age: 78
LOS: 1 days | End: 2023-07-05
Payer: MEDICARE

## 2023-07-05 DIAGNOSIS — Z98.890 OTHER SPECIFIED POSTPROCEDURAL STATES: Chronic | ICD-10-CM

## 2023-07-05 DIAGNOSIS — N50.82 SCROTAL PAIN: ICD-10-CM

## 2023-07-05 PROCEDURE — 93975 VASCULAR STUDY: CPT

## 2023-07-05 PROCEDURE — 93975 VASCULAR STUDY: CPT | Mod: 26

## 2023-07-05 PROCEDURE — 76870 US EXAM SCROTUM: CPT

## 2023-07-05 PROCEDURE — 76870 US EXAM SCROTUM: CPT | Mod: 26

## 2023-07-11 ENCOUNTER — APPOINTMENT (OUTPATIENT)
Dept: HEMATOLOGY ONCOLOGY | Facility: CLINIC | Age: 78
End: 2023-07-11
Payer: MEDICARE

## 2023-07-11 ENCOUNTER — OUTPATIENT (OUTPATIENT)
Dept: OUTPATIENT SERVICES | Facility: HOSPITAL | Age: 78
LOS: 1 days | Discharge: HOME | End: 2023-07-11

## 2023-07-11 VITALS
HEART RATE: 86 BPM | WEIGHT: 246 LBS | OXYGEN SATURATION: 98 % | TEMPERATURE: 97.1 F | DIASTOLIC BLOOD PRESSURE: 73 MMHG | BODY MASS INDEX: 37.4 KG/M2 | RESPIRATION RATE: 17 BRPM | SYSTOLIC BLOOD PRESSURE: 145 MMHG

## 2023-07-11 DIAGNOSIS — Z98.890 OTHER SPECIFIED POSTPROCEDURAL STATES: Chronic | ICD-10-CM

## 2023-07-11 DIAGNOSIS — N18.6 END STAGE RENAL DISEASE: ICD-10-CM

## 2023-07-11 PROCEDURE — 99203 OFFICE O/P NEW LOW 30 MIN: CPT

## 2023-07-11 NOTE — ASSESSMENT
[FreeTextEntry1] : 77-year-old male with stage III chronic renal insufficiency, with work-up showing normal serum immunofixation, and urine studies demonstrating free kappa light chain of 134, free lambda of 21.6, with a ratio of 6.2.\par We were unable to draw an immunoglobulin panel from the serum today to assess serum free light chain status, but will request urine immunofixation to exclude the possibility of monoclonality.\par At the present time the significance of the urine free light chain assay in this patient does not suggest a likely diagnosis of plasma cell dyscrasia.

## 2023-07-11 NOTE — REASON FOR VISIT
[Initial Consultation] : an initial consultation for [FreeTextEntry2] : Abnormal free kappa/lambda light chains in the urine

## 2023-07-11 NOTE — HISTORY OF PRESENT ILLNESS
[de-identified] : This 77-year-old male with stage III chronic renal insufficiency runs a BUN of 37, creatinine 1.77.\par Work-up revealed a normal serum protein electrophoresis, normal serum immunofixation, and demonstrated free kappa light chain of 134 mg, free lambda of 21.6, with a ratio of 6.2.\par No evidence of monoclonal protein spike in either serum or urine.\par H/H 11.4/36.1\par Mild fatigue, otherwise asymptomatic.\par

## 2023-07-12 DIAGNOSIS — N18.30 CHRONIC KIDNEY DISEASE, STAGE 3 UNSPECIFIED: ICD-10-CM

## 2023-07-20 ENCOUNTER — RX RENEWAL (OUTPATIENT)
Age: 78
End: 2023-07-20

## 2023-07-21 ENCOUNTER — LABORATORY RESULT (OUTPATIENT)
Age: 78
End: 2023-07-21

## 2023-07-21 LAB
ALBUMIN SERPL ELPH-MCNC: 4.3 G/DL
ALP BLD-CCNC: 86 U/L
ALT SERPL-CCNC: 21 U/L
ANION GAP SERPL CALC-SCNC: 14 MMOL/L
AST SERPL-CCNC: 24 U/L
BILIRUB SERPL-MCNC: 0.3 MG/DL
BUN SERPL-MCNC: 28 MG/DL
CALCIUM SERPL-MCNC: 9.7 MG/DL
CHLORIDE SERPL-SCNC: 106 MMOL/L
CO2 SERPL-SCNC: 22 MMOL/L
CREAT SERPL-MCNC: 1.53 MG/DL
DEPRECATED KAPPA LC FREE/LAMBDA SER: 1.44 RATIO
EGFR: 47 ML/MIN/1.73M2
GLUCOSE SERPL-MCNC: 144 MG/DL
IGA SER QL IEP: 153 MG/DL
IGG SER QL IEP: 895 MG/DL
IGM SER QL IEP: 96 MG/DL
KAPPA LC CSF-MCNC: 2.66 MG/DL
KAPPA LC SERPL-MCNC: 3.83 MG/DL
POTASSIUM SERPL-SCNC: 4.7 MMOL/L
PROT SERPL-MCNC: 6.8 G/DL
SODIUM SERPL-SCNC: 142 MMOL/L

## 2023-07-26 LAB — IGA 24H UR QL IFE: NORMAL

## 2023-08-02 ENCOUNTER — NON-APPOINTMENT (OUTPATIENT)
Age: 78
End: 2023-08-02

## 2023-08-02 ENCOUNTER — APPOINTMENT (OUTPATIENT)
Dept: ELECTROPHYSIOLOGY | Facility: CLINIC | Age: 78
End: 2023-08-02
Payer: MEDICARE

## 2023-08-02 PROCEDURE — 93298 REM INTERROG DEV EVAL SCRMS: CPT

## 2023-08-02 PROCEDURE — G2066: CPT

## 2023-08-14 ENCOUNTER — RX RENEWAL (OUTPATIENT)
Age: 78
End: 2023-08-14

## 2023-08-22 ENCOUNTER — RX RENEWAL (OUTPATIENT)
Age: 78
End: 2023-08-22

## 2023-09-06 ENCOUNTER — NON-APPOINTMENT (OUTPATIENT)
Age: 78
End: 2023-09-06

## 2023-09-06 ENCOUNTER — APPOINTMENT (OUTPATIENT)
Dept: ELECTROPHYSIOLOGY | Facility: CLINIC | Age: 78
End: 2023-09-06
Payer: MEDICARE

## 2023-09-06 PROCEDURE — G2066: CPT

## 2023-09-06 PROCEDURE — 93298 REM INTERROG DEV EVAL SCRMS: CPT

## 2023-09-14 ENCOUNTER — RX RENEWAL (OUTPATIENT)
Age: 78
End: 2023-09-14

## 2023-09-14 RX ORDER — ALLOPURINOL 100 MG/1
100 TABLET ORAL DAILY
Qty: 90 | Refills: 2 | Status: ACTIVE | COMMUNITY
Start: 2021-01-12 | End: 1900-01-01

## 2023-09-30 ENCOUNTER — RX RENEWAL (OUTPATIENT)
Age: 78
End: 2023-09-30

## 2023-10-09 ENCOUNTER — NON-APPOINTMENT (OUTPATIENT)
Age: 78
End: 2023-10-09

## 2023-10-09 ENCOUNTER — APPOINTMENT (OUTPATIENT)
Dept: ELECTROPHYSIOLOGY | Facility: CLINIC | Age: 78
End: 2023-10-09
Payer: MEDICARE

## 2023-10-09 PROCEDURE — G2066: CPT

## 2023-10-09 PROCEDURE — 93298 REM INTERROG DEV EVAL SCRMS: CPT

## 2023-11-06 ENCOUNTER — RX RENEWAL (OUTPATIENT)
Age: 78
End: 2023-11-06

## 2023-11-09 ENCOUNTER — RX RENEWAL (OUTPATIENT)
Age: 78
End: 2023-11-09

## 2023-11-09 RX ORDER — AMLODIPINE BESYLATE 10 MG/1
10 TABLET ORAL
Qty: 90 | Refills: 1 | Status: ACTIVE | COMMUNITY
Start: 2019-08-30 | End: 1900-01-01

## 2023-11-20 ENCOUNTER — APPOINTMENT (OUTPATIENT)
Dept: ELECTROPHYSIOLOGY | Facility: CLINIC | Age: 78
End: 2023-11-20
Payer: MEDICARE

## 2023-11-20 ENCOUNTER — NON-APPOINTMENT (OUTPATIENT)
Age: 78
End: 2023-11-20

## 2023-11-20 VITALS
BODY MASS INDEX: 37.28 KG/M2 | RESPIRATION RATE: 16 BRPM | OXYGEN SATURATION: 97 % | HEART RATE: 92 BPM | WEIGHT: 246 LBS | HEIGHT: 68 IN | SYSTOLIC BLOOD PRESSURE: 141 MMHG | DIASTOLIC BLOOD PRESSURE: 75 MMHG

## 2023-11-20 DIAGNOSIS — R55 SYNCOPE AND COLLAPSE: ICD-10-CM

## 2023-11-20 DIAGNOSIS — Z95.818 PRESENCE OF OTHER CARDIAC IMPLANTS AND GRAFTS: ICD-10-CM

## 2023-11-20 PROCEDURE — 93297 REM INTERROG DEV EVAL ICPMS: CPT

## 2023-12-05 ENCOUNTER — RX RENEWAL (OUTPATIENT)
Age: 78
End: 2023-12-05

## 2023-12-17 ENCOUNTER — RX RENEWAL (OUTPATIENT)
Age: 78
End: 2023-12-17

## 2023-12-17 RX ORDER — NYSTATIN 100000 U/G
100000 OINTMENT TOPICAL 3 TIMES DAILY
Qty: 30 | Refills: 3 | Status: ACTIVE | COMMUNITY
Start: 2021-11-16 | End: 1900-01-01

## 2023-12-20 ENCOUNTER — APPOINTMENT (OUTPATIENT)
Dept: ELECTROPHYSIOLOGY | Facility: CLINIC | Age: 78
End: 2023-12-20
Payer: MEDICARE

## 2023-12-21 ENCOUNTER — NON-APPOINTMENT (OUTPATIENT)
Age: 78
End: 2023-12-21

## 2023-12-21 ENCOUNTER — RESULT CHARGE (OUTPATIENT)
Age: 78
End: 2023-12-21

## 2023-12-21 PROCEDURE — G2066: CPT

## 2023-12-21 PROCEDURE — 93298 REM INTERROG DEV EVAL SCRMS: CPT

## 2023-12-22 ENCOUNTER — LABORATORY RESULT (OUTPATIENT)
Age: 78
End: 2023-12-22

## 2023-12-23 ENCOUNTER — OUTPATIENT (OUTPATIENT)
Dept: OUTPATIENT SERVICES | Facility: HOSPITAL | Age: 78
LOS: 1 days | End: 2023-12-23
Payer: MEDICARE

## 2023-12-23 ENCOUNTER — APPOINTMENT (OUTPATIENT)
Dept: ULTRASOUND IMAGING | Facility: CLINIC | Age: 78
End: 2023-12-23
Payer: MEDICARE

## 2023-12-23 DIAGNOSIS — Z98.890 OTHER SPECIFIED POSTPROCEDURAL STATES: Chronic | ICD-10-CM

## 2023-12-23 DIAGNOSIS — Z85.51 PERSONAL HISTORY OF MALIGNANT NEOPLASM OF BLADDER: ICD-10-CM

## 2023-12-23 DIAGNOSIS — R30.0 DYSURIA: ICD-10-CM

## 2023-12-23 LAB
APPEARANCE: ABNORMAL
BILIRUBIN URINE: NEGATIVE
BLOOD URINE: ABNORMAL
COLOR: YELLOW
GLUCOSE QUALITATIVE U: NEGATIVE MG/DL
KETONES URINE: NEGATIVE MG/DL
LEUKOCYTE ESTERASE URINE: ABNORMAL
NITRITE URINE: NEGATIVE
PH URINE: 5.5
PROTEIN URINE: 300 MG/DL
SPECIFIC GRAVITY URINE: 1.02
UROBILINOGEN URINE: 0.2 MG/DL

## 2023-12-23 PROCEDURE — 76770 US EXAM ABDO BACK WALL COMP: CPT | Mod: 26

## 2023-12-23 PROCEDURE — 76770 US EXAM ABDO BACK WALL COMP: CPT

## 2024-01-14 ENCOUNTER — RX RENEWAL (OUTPATIENT)
Age: 79
End: 2024-01-14

## 2024-01-14 RX ORDER — TAMSULOSIN HYDROCHLORIDE 0.4 MG/1
0.4 CAPSULE ORAL
Qty: 90 | Refills: 2 | Status: ACTIVE | COMMUNITY
Start: 2022-08-01 | End: 1900-01-01

## 2024-01-25 ENCOUNTER — APPOINTMENT (OUTPATIENT)
Dept: ELECTROPHYSIOLOGY | Facility: CLINIC | Age: 79
End: 2024-01-25
Payer: MEDICARE

## 2024-01-25 ENCOUNTER — NON-APPOINTMENT (OUTPATIENT)
Age: 79
End: 2024-01-25

## 2024-01-25 PROCEDURE — 93298 REM INTERROG DEV EVAL SCRMS: CPT

## 2024-01-28 RX ORDER — TIRZEPATIDE 2.5 MG/.5ML
2.5 INJECTION, SOLUTION SUBCUTANEOUS WEEKLY
Qty: 1 | Refills: 2 | Status: DISCONTINUED | COMMUNITY
Start: 2023-03-29 | End: 2024-01-28

## 2024-01-28 RX ORDER — COLCHICINE 0.6 MG/1
0.6 TABLET ORAL
Qty: 60 | Refills: 2 | Status: ACTIVE | COMMUNITY
Start: 2017-05-17 | End: 1900-01-01

## 2024-01-29 ENCOUNTER — NON-APPOINTMENT (OUTPATIENT)
Age: 79
End: 2024-01-29

## 2024-01-31 ENCOUNTER — NON-APPOINTMENT (OUTPATIENT)
Age: 79
End: 2024-01-31

## 2024-01-31 DIAGNOSIS — N18.30 CHRONIC KIDNEY DISEASE, STAGE 3 UNSPECIFIED: ICD-10-CM

## 2024-02-02 RX ORDER — BENZONATATE 100 MG/1
100 CAPSULE ORAL
Qty: 60 | Refills: 1 | Status: ACTIVE | COMMUNITY
Start: 2024-02-02 | End: 1900-01-01

## 2024-02-03 LAB
INFLUENZA A RESULT: NOT DETECTED
INFLUENZA B RESULT: NOT DETECTED
RESP SYN VIRUS RESULT: NOT DETECTED
SARS-COV-2 RESULT: NOT DETECTED

## 2024-02-05 ENCOUNTER — APPOINTMENT (OUTPATIENT)
Dept: FAMILY MEDICINE | Facility: CLINIC | Age: 79
End: 2024-02-05
Payer: MEDICARE

## 2024-02-05 VITALS
OXYGEN SATURATION: 98 % | WEIGHT: 248 LBS | HEART RATE: 110 BPM | SYSTOLIC BLOOD PRESSURE: 132 MMHG | HEIGHT: 68 IN | BODY MASS INDEX: 37.59 KG/M2 | DIASTOLIC BLOOD PRESSURE: 74 MMHG | TEMPERATURE: 97.7 F | RESPIRATION RATE: 20 BRPM

## 2024-02-05 DIAGNOSIS — B34.9 VIRAL INFECTION, UNSPECIFIED: ICD-10-CM

## 2024-02-05 PROCEDURE — 99213 OFFICE O/P EST LOW 20 MIN: CPT

## 2024-02-05 NOTE — REVIEW OF SYSTEMS
[Fever] : fever [Earache] : no earache [Hearing Loss] : no hearing loss [Postnasal Drip] : postnasal drip [Nasal Discharge] : nasal discharge [Sore Throat] : sore throat [Hoarseness] : hoarseness [Chest Pain] : no chest pain [Orthopena] : no orthopnea [Shortness Of Breath] : no shortness of breath [Wheezing] : no wheezing [Cough] : cough [Dyspnea on Exertion] : not dyspnea on exertion [Abdominal Pain] : no abdominal pain [Nausea] : no nausea [Constipation] : no constipation [Dysuria] : no dysuria [Incontinence] : no incontinence

## 2024-02-05 NOTE — HISTORY OF PRESENT ILLNESS
[FreeTextEntry8] : Patient here with approximately 5-day history of cough nasal congestion malaise initially fever he had been tested for the viral panel of COVID RSV and influenza several days ago and this was negative review of systems is otherwise unremarkable he has not responded well to symptomatic treatment and now has developed some laryngitis along with cough and sinus type congestion fever is no longer present

## 2024-02-05 NOTE — PHYSICAL EXAM
[No Acute Distress] : no acute distress [Normal Sclera/Conjunctiva] : normal sclera/conjunctiva [PERRL] : pupils equal round and reactive to light [Normal Outer Ear/Nose] : the outer ears and nose were normal in appearance [Normal Oropharynx] : the oropharynx was normal [No JVD] : no jugular venous distention [No Lymphadenopathy] : no lymphadenopathy [Supple] : supple [No Respiratory Distress] : no respiratory distress  [No Accessory Muscle Use] : no accessory muscle use [Clear to Auscultation] : lungs were clear to auscultation bilaterally [No Edema] : there was no peripheral edema [de-identified] : obese [de-identified] : [id number]

## 2024-02-05 NOTE — ASSESSMENT
[FreeTextEntry1] : Examination is unremarkable lungs are clear patient will be retested with a viral panel he will continue on symptomatic treatment and follow-up with us telephonically within a day O2 sat is excellent patient is morbidly obese and that likely explains the mild tachycardia

## 2024-02-10 ENCOUNTER — LABORATORY RESULT (OUTPATIENT)
Age: 79
End: 2024-02-10

## 2024-02-10 LAB
ESTIMATED AVERAGE GLUCOSE: 111 MG/DL
HBA1C MFR BLD HPLC: 5.5 %
HCT VFR BLD CALC: 35.5 %
HGB BLD-MCNC: 11.3 G/DL
MCHC RBC-ENTMCNC: 29.6 PG
MCHC RBC-ENTMCNC: 31.8 GM/DL
MCV RBC AUTO: 92.9 FL
PLATELET # BLD AUTO: 262 K/UL
RBC # BLD: 3.82 M/UL
RBC # FLD: 14 %
WBC # FLD AUTO: 7.44 K/UL

## 2024-02-12 ENCOUNTER — APPOINTMENT (OUTPATIENT)
Dept: FAMILY MEDICINE | Facility: CLINIC | Age: 79
End: 2024-02-12
Payer: MEDICARE

## 2024-02-12 VITALS
OXYGEN SATURATION: 98 % | HEART RATE: 90 BPM | RESPIRATION RATE: 14 BRPM | TEMPERATURE: 97.8 F | SYSTOLIC BLOOD PRESSURE: 130 MMHG | BODY MASS INDEX: 37.59 KG/M2 | HEIGHT: 68 IN | DIASTOLIC BLOOD PRESSURE: 63 MMHG | WEIGHT: 248 LBS

## 2024-02-12 LAB
ALBUMIN SERPL ELPH-MCNC: 4.1 G/DL
ALP BLD-CCNC: 139 U/L
ALT SERPL-CCNC: 37 U/L
ANION GAP SERPL CALC-SCNC: 13 MMOL/L
APPEARANCE: CLEAR
AST SERPL-CCNC: 30 U/L
BILIRUB SERPL-MCNC: <0.2 MG/DL
BILIRUBIN URINE: NEGATIVE
BLOOD URINE: NEGATIVE
BUN SERPL-MCNC: 33 MG/DL
CALCIUM SERPL-MCNC: 9.1 MG/DL
CHLORIDE SERPL-SCNC: 111 MMOL/L
CHOLEST SERPL-MCNC: 151 MG/DL
CO2 SERPL-SCNC: 16 MMOL/L
COLOR: YELLOW
CREAT SERPL-MCNC: 1.55 MG/DL
EGFR: 46 ML/MIN/1.73M2
GLUCOSE QUALITATIVE U: NEGATIVE MG/DL
GLUCOSE SERPL-MCNC: 106 MG/DL
HDLC SERPL-MCNC: 37 MG/DL
KETONES URINE: NEGATIVE MG/DL
LDLC SERPL CALC-MCNC: 58 MG/DL
LEUKOCYTE ESTERASE URINE: NEGATIVE
NITRITE URINE: NEGATIVE
NONHDLC SERPL-MCNC: 114 MG/DL
PH URINE: 6
POTASSIUM SERPL-SCNC: 4.4 MMOL/L
PROT SERPL-MCNC: 6.9 G/DL
PROTEIN URINE: 100 MG/DL
SODIUM SERPL-SCNC: 140 MMOL/L
SPECIFIC GRAVITY URINE: 1.02
TRIGL SERPL-MCNC: 366 MG/DL
TSH SERPL-ACNC: 2.73 UIU/ML
UROBILINOGEN URINE: 0.2 MG/DL

## 2024-02-12 PROCEDURE — 99213 OFFICE O/P EST LOW 20 MIN: CPT

## 2024-02-12 RX ORDER — COLCHICINE 0.6 MG/1
0.6 CAPSULE ORAL
Qty: 90 | Refills: 2 | Status: ACTIVE | COMMUNITY
Start: 2024-02-12 | End: 1900-01-01

## 2024-02-12 RX ORDER — LOSARTAN POTASSIUM 25 MG/1
25 TABLET, FILM COATED ORAL DAILY
Refills: 0 | Status: DISCONTINUED | COMMUNITY
End: 2024-02-12

## 2024-02-12 RX ORDER — CIPROFLOXACIN HYDROCHLORIDE 250 MG/1
250 TABLET, FILM COATED ORAL
Qty: 14 | Refills: 0 | Status: DISCONTINUED | COMMUNITY
Start: 2023-12-24 | End: 2024-02-12

## 2024-02-12 RX ORDER — NYSTATIN 100000 1/G
100000 POWDER TOPICAL 3 TIMES DAILY
Qty: 60 | Refills: 2 | Status: DISCONTINUED | COMMUNITY
Start: 2023-07-03 | End: 2024-02-12

## 2024-02-12 RX ORDER — MIRABEGRON 50 MG/1
50 TABLET, FILM COATED, EXTENDED RELEASE ORAL
Refills: 0 | Status: DISCONTINUED | COMMUNITY
Start: 2023-05-24 | End: 2024-02-12

## 2024-02-27 ENCOUNTER — NON-APPOINTMENT (OUTPATIENT)
Age: 79
End: 2024-02-27

## 2024-02-27 LAB
ALBUMIN SERPL ELPH-MCNC: 4.3 G/DL
ALP BLD-CCNC: 120 U/L
ALT SERPL-CCNC: 43 U/L
ANION GAP SERPL CALC-SCNC: 16 MMOL/L
AST SERPL-CCNC: 36 U/L
BILIRUB SERPL-MCNC: 0.2 MG/DL
BUN SERPL-MCNC: 34 MG/DL
CALCIUM SERPL-MCNC: 9.3 MG/DL
CHLORIDE SERPL-SCNC: 109 MMOL/L
CO2 SERPL-SCNC: 17 MMOL/L
CREAT SERPL-MCNC: 1.68 MG/DL
EGFR: 41 ML/MIN/1.73M2
FERRITIN SERPL-MCNC: 272 NG/ML
GLUCOSE SERPL-MCNC: 107 MG/DL
HCT VFR BLD CALC: 35.7 %
HGB BLD-MCNC: 11.2 G/DL
IRON SATN MFR SERPL: 24 %
IRON SERPL-MCNC: 73 UG/DL
MAGNESIUM SERPL-MCNC: 2 MG/DL
MCHC RBC-ENTMCNC: 29.2 PG
MCHC RBC-ENTMCNC: 31.4 GM/DL
MCV RBC AUTO: 93.2 FL
PLATELET # BLD AUTO: 172 K/UL
POTASSIUM SERPL-SCNC: 4.8 MMOL/L
PROT SERPL-MCNC: 6.6 G/DL
RBC # BLD: 3.83 M/UL
RBC # FLD: 14.4 %
SODIUM SERPL-SCNC: 142 MMOL/L
TIBC SERPL-MCNC: 305 UG/DL
UIBC SERPL-MCNC: 232 UG/DL
WBC # FLD AUTO: 5.82 K/UL

## 2024-02-28 ENCOUNTER — NON-APPOINTMENT (OUTPATIENT)
Age: 79
End: 2024-02-28

## 2024-02-28 ENCOUNTER — APPOINTMENT (OUTPATIENT)
Dept: ELECTROPHYSIOLOGY | Facility: CLINIC | Age: 79
End: 2024-02-28
Payer: MEDICARE

## 2024-02-29 PROCEDURE — 93298 REM INTERROG DEV EVAL SCRMS: CPT

## 2024-03-08 ENCOUNTER — RX RENEWAL (OUTPATIENT)
Age: 79
End: 2024-03-08

## 2024-03-25 RX ORDER — SODIUM BICARBONATE 650 MG/1
650 TABLET ORAL
Qty: 120 | Refills: 3 | Status: ACTIVE | COMMUNITY
Start: 2023-05-31 | End: 1900-01-01

## 2024-03-28 LAB
ALBUMIN SERPL ELPH-MCNC: 4.6 G/DL
ANION GAP SERPL CALC-SCNC: 17 MMOL/L
BUN SERPL-MCNC: 31 MG/DL
CALCIUM SERPL-MCNC: 9.9 MG/DL
CHLORIDE SERPL-SCNC: 105 MMOL/L
CO2 SERPL-SCNC: 23 MMOL/L
CREAT SERPL-MCNC: 1.72 MG/DL
EGFR: 40 ML/MIN/1.73M2
GLUCOSE SERPL-MCNC: 90 MG/DL
PHOSPHATE SERPL-MCNC: 4 MG/DL
POTASSIUM SERPL-SCNC: 4.7 MMOL/L
SODIUM SERPL-SCNC: 144 MMOL/L

## 2024-04-03 ENCOUNTER — NON-APPOINTMENT (OUTPATIENT)
Age: 79
End: 2024-04-03

## 2024-04-04 ENCOUNTER — APPOINTMENT (OUTPATIENT)
Dept: ELECTROPHYSIOLOGY | Facility: CLINIC | Age: 79
End: 2024-04-04
Payer: MEDICARE

## 2024-04-04 PROCEDURE — 93298 REM INTERROG DEV EVAL SCRMS: CPT

## 2024-05-02 RX ORDER — TIRZEPATIDE 7.5 MG/.5ML
7.5 INJECTION, SOLUTION SUBCUTANEOUS
Qty: 2 | Refills: 1 | Status: DISCONTINUED | COMMUNITY
Start: 2023-03-29 | End: 2024-05-02

## 2024-05-08 ENCOUNTER — NON-APPOINTMENT (OUTPATIENT)
Age: 79
End: 2024-05-08

## 2024-05-09 ENCOUNTER — APPOINTMENT (OUTPATIENT)
Dept: ELECTROPHYSIOLOGY | Facility: CLINIC | Age: 79
End: 2024-05-09
Payer: MEDICARE

## 2024-05-09 PROCEDURE — 93298 REM INTERROG DEV EVAL SCRMS: CPT

## 2024-06-12 ENCOUNTER — NON-APPOINTMENT (OUTPATIENT)
Age: 79
End: 2024-06-12

## 2024-06-13 ENCOUNTER — RX RENEWAL (OUTPATIENT)
Age: 79
End: 2024-06-13

## 2024-06-13 ENCOUNTER — APPOINTMENT (OUTPATIENT)
Dept: ELECTROPHYSIOLOGY | Facility: CLINIC | Age: 79
End: 2024-06-13
Payer: MEDICARE

## 2024-06-13 PROCEDURE — 93298 REM INTERROG DEV EVAL SCRMS: CPT

## 2024-06-13 RX ORDER — LOSARTAN POTASSIUM 50 MG/1
50 TABLET, FILM COATED ORAL
Qty: 60 | Refills: 2 | Status: ACTIVE | COMMUNITY
Start: 2023-12-05 | End: 1900-01-01

## 2024-06-25 ENCOUNTER — RX RENEWAL (OUTPATIENT)
Age: 79
End: 2024-06-25

## 2024-06-25 RX ORDER — TIRZEPATIDE 10 MG/.5ML
10 INJECTION, SOLUTION SUBCUTANEOUS WEEKLY
Qty: 1 | Refills: 1 | Status: ACTIVE | COMMUNITY
Start: 2024-05-02 | End: 1900-01-01

## 2024-07-17 ENCOUNTER — NON-APPOINTMENT (OUTPATIENT)
Age: 79
End: 2024-07-17

## 2024-07-18 ENCOUNTER — APPOINTMENT (OUTPATIENT)
Dept: ELECTROPHYSIOLOGY | Facility: CLINIC | Age: 79
End: 2024-07-18
Payer: MEDICARE

## 2024-07-18 PROCEDURE — 93298 REM INTERROG DEV EVAL SCRMS: CPT

## 2024-07-20 DIAGNOSIS — N18.30 CHRONIC KIDNEY DISEASE, STAGE 3 UNSPECIFIED: ICD-10-CM

## 2024-07-22 ENCOUNTER — LABORATORY RESULT (OUTPATIENT)
Age: 79
End: 2024-07-22

## 2024-07-22 LAB
HCT VFR BLD CALC: 36.4 %
HGB BLD-MCNC: 11.6 G/DL
MCHC RBC-ENTMCNC: 30.9 PG
MCHC RBC-ENTMCNC: 31.9 GM/DL
MCV RBC AUTO: 96.8 FL
PLATELET # BLD AUTO: 194 K/UL
RBC # BLD: 3.76 M/UL
RBC # FLD: 14.1 %
WBC # FLD AUTO: 6.74 K/UL

## 2024-07-25 LAB
ALBUMIN SERPL ELPH-MCNC: 4.2 G/DL
ALP BLD-CCNC: 96 U/L
ALT SERPL-CCNC: 33 U/L
ANION GAP SERPL CALC-SCNC: 13 MMOL/L
APPEARANCE: CLEAR
AST SERPL-CCNC: 30 U/L
BILIRUB SERPL-MCNC: 0.2 MG/DL
BILIRUBIN URINE: NEGATIVE
BLOOD URINE: NEGATIVE
BUN SERPL-MCNC: 41 MG/DL
CALCIUM SERPL-MCNC: 9.1 MG/DL
CHLORIDE SERPL-SCNC: 112 MMOL/L
CHOLEST SERPL-MCNC: 176 MG/DL
CO2 SERPL-SCNC: 18 MMOL/L
COLOR: YELLOW
CREAT SERPL-MCNC: 1.64 MG/DL
EGFR: 43 ML/MIN/1.73M2
ESTIMATED AVERAGE GLUCOSE: 111 MG/DL
FERRITIN SERPL-MCNC: 246 NG/ML
GLUCOSE QUALITATIVE U: NEGATIVE MG/DL
GLUCOSE SERPL-MCNC: 122 MG/DL
HBA1C MFR BLD HPLC: 5.5 %
HDLC SERPL-MCNC: 40 MG/DL
KETONES URINE: NEGATIVE MG/DL
LDLC SERPL CALC-MCNC: 86 MG/DL
LEUKOCYTE ESTERASE URINE: NEGATIVE
NITRITE URINE: NEGATIVE
NONHDLC SERPL-MCNC: 136 MG/DL
PH URINE: 5.5
POTASSIUM SERPL-SCNC: 4.5 MMOL/L
PROT SERPL-MCNC: 6.5 G/DL
PROTEIN URINE: 100 MG/DL
SODIUM SERPL-SCNC: 144 MMOL/L
SPECIFIC GRAVITY URINE: 1.02
TRIGL SERPL-MCNC: 300 MG/DL
TSH SERPL-ACNC: 4.04 UIU/ML
URATE SERPL-MCNC: 7.6 MG/DL
UROBILINOGEN URINE: 0.2 MG/DL

## 2024-07-26 ENCOUNTER — APPOINTMENT (OUTPATIENT)
Dept: FAMILY MEDICINE | Facility: CLINIC | Age: 79
End: 2024-07-26

## 2024-08-02 ENCOUNTER — APPOINTMENT (OUTPATIENT)
Dept: FAMILY MEDICINE | Facility: CLINIC | Age: 79
End: 2024-08-02
Payer: MEDICARE

## 2024-08-02 ENCOUNTER — NON-APPOINTMENT (OUTPATIENT)
Age: 79
End: 2024-08-02

## 2024-08-02 VITALS
DIASTOLIC BLOOD PRESSURE: 72 MMHG | HEART RATE: 90 BPM | RESPIRATION RATE: 14 BRPM | BODY MASS INDEX: 37.28 KG/M2 | SYSTOLIC BLOOD PRESSURE: 127 MMHG | WEIGHT: 246 LBS | TEMPERATURE: 97.7 F | HEIGHT: 68 IN | OXYGEN SATURATION: 97 %

## 2024-08-02 DIAGNOSIS — M16.12 UNILATERAL PRIMARY OSTEOARTHRITIS, LEFT HIP: ICD-10-CM

## 2024-08-02 DIAGNOSIS — Z99.2 END STAGE RENAL DISEASE: ICD-10-CM

## 2024-08-02 DIAGNOSIS — R55 SYNCOPE AND COLLAPSE: ICD-10-CM

## 2024-08-02 DIAGNOSIS — D63.1 END STAGE RENAL DISEASE: ICD-10-CM

## 2024-08-02 DIAGNOSIS — R26.9 UNSPECIFIED ABNORMALITIES OF GAIT AND MOBILITY: ICD-10-CM

## 2024-08-02 DIAGNOSIS — Z85.51 PERSONAL HISTORY OF MALIGNANT NEOPLASM OF BLADDER: ICD-10-CM

## 2024-08-02 DIAGNOSIS — N18.6 END STAGE RENAL DISEASE: ICD-10-CM

## 2024-08-02 DIAGNOSIS — N18.30 CHRONIC KIDNEY DISEASE, STAGE 3 UNSPECIFIED: ICD-10-CM

## 2024-08-02 PROCEDURE — 93000 ELECTROCARDIOGRAM COMPLETE: CPT

## 2024-08-02 PROCEDURE — G0439: CPT

## 2024-08-02 NOTE — PHYSICAL EXAM
[No Acute Distress] : no acute distress [Well Developed] : well developed [Normal] : the outer ears and nose were normal in appearance and the oropharynx was normal [No JVD] : no jugular venous distention [Supple] : supple [No Respiratory Distress] : no respiratory distress  [Clear to Auscultation] : lungs were clear to auscultation bilaterally [Normal Rate] : normal rate  [Regular Rhythm] : with a regular rhythm [Normal S1, S2] : normal S1 and S2 [No Murmur] : no murmur heard [No Edema] : there was no peripheral edema [Soft] : abdomen soft [Non Tender] : non-tender [No HSM] : no HSM [Normal Supraclavicular Nodes] : no supraclavicular lymphadenopathy [Normal Posterior Cervical Nodes] : no posterior cervical lymphadenopathy [No CVA Tenderness] : no CVA  tenderness [No Spinal Tenderness] : no spinal tenderness [No Joint Swelling] : no joint swelling [No Rash] : no rash [Coordination Grossly Intact] : coordination grossly intact [No Focal Deficits] : no focal deficits [de-identified] : Morbid obesity [de-identified] : Patient with unstable gait likely due to back issues

## 2024-08-02 NOTE — ASSESSMENT
[FreeTextEntry1] : Patient with multiple issues his greatest complaint is about his mobility and deconditioning he has osteoarthritis of both hips severe spinal issues and deconditioning he also has chronic kidney disease history of bladder cancer which has been quiet and occasional palpitations in the past he has had a relatively normal cardiac evaluation pulmonary evaluation recent laboratory reveals a mild metabolic acidosis from his chronic kidney disease otherwise lab is fairly unremarkable he has been on Ozempic for weight loss but has not accomplished much in this area he is very immobile easily short of breath and unable to do any meaningful exercise we have recommended a cardiac reevaluation and follow-up spinal surgery reevaluation and follow-up with hopeful recommendations for physiatry or physical therapy modalities weight loss all in a hope that if mobility can be increased weight loss may be encouraged and general wellbeing improvedHe is also under a great deal of 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

## 2024-08-02 NOTE — HISTORY OF PRESENT ILLNESS
[FreeTextEntry1] : Patient here for annual wellness exam [de-identified] : Here for annual wellness exam and issues revolve around severe osteoarthritis of spine and hips with ambulatory difficulty chronic kidney disease status post bladder cancer morbid obesity and deconditioning

## 2024-08-02 NOTE — REVIEW OF SYSTEMS
[Fatigue] : fatigue [Nasal Discharge] : nasal discharge [Shortness Of Breath] : shortness of breath [Dyspnea on Exertion] : dyspnea on exertion [Joint Pain] : joint pain [Joint Stiffness] : joint stiffness [Back Pain] : back pain [Unsteady Walk] : ataxia [Anxiety] : anxiety [Fever] : no fever [Chills] : no chills [Vision Problems] : no vision problems [Sore Throat] : no sore throat [Chest Pain] : no chest pain [Palpitations] : no palpitations [Wheezing] : no wheezing [Cough] : no cough [Abdominal Pain] : no abdominal pain [Nausea] : no nausea [Vomiting] : no vomiting [Heartburn] : no heartburn [Melena] : no melena [Dysuria] : no dysuria [Incontinence] : no incontinence [Frequency] : no frequency [Skin Rash] : no skin rash [Headache] : no headache [Dizziness] : no dizziness [Fainting] : no fainting

## 2024-08-02 NOTE — HEALTH RISK ASSESSMENT
[Poor] : ~his/her~ current health as poor [Good] : ~his/her~  mood as  good [Yes] : Yes [2 - 4 times a month (2 pts)] : 2-4 times a month (2 points) [1 or 2 (0 pts)] : 1 or 2 (0 points) [Former] : Former [20 or more] : 20 or more [> 15 Years] : > 15 Years [No Retinopathy] : No retinopathy [Patient reported colonoscopy was normal] : Patient reported colonoscopy was normal [None] : None [With Significant Other] : lives with significant other [# of Members in Household ___] :  household currently consist of [unfilled] member(s) [Employed] : employed [Graduate School] : graduate school [] :  [# Of Children ___] : has [unfilled] children [Sexually Active] : sexually active [Feels Safe at Home] : Feels safe at home [Fully functional (bathing, dressing, toileting, transferring, walking, feeding)] : Fully functional (bathing, dressing, toileting, transferring, walking, feeding) [Fully functional (using the telephone, shopping, preparing meals, housekeeping, doing laundry, using] : Fully functional and needs no help or supervision to perform IADLs (using the telephone, shopping, preparing meals, housekeeping, doing laundry, using transportation, managing medications and managing finances) [Smoke Detector] : smoke detector [Seat Belt] :  uses seat belt [Sunscreen] : uses sunscreen [With Patient/Caregiver] : , with patient/caregiver [Name: ___] : Health Care Proxy's Name: [unfilled]  [Relationship: ___] : Relationship: [unfilled] [FreeTextEntry1] : lack of mobility [de-identified] : none [de-identified] : Cardiology, Renal, Ortho, Urology, Pulmonary [de-identified] : stopped 1988 [EyeExamDate] : 6/24 [Change in mental status noted] : No change in mental status noted [Reports changes in hearing] : Reports no changes in hearing [Reports changes in vision] : Reports no changes in vision [Reports changes in dental health] : Reports no changes in dental health [Carbon Monoxide Detector] : no carbon monoxide detector [ColonoscopyDate] : 5/24 [AdvancecareDate] : 8/24

## 2024-08-05 NOTE — ED ADULT NURSE NOTE - NS ED NURSE IV DC DT
Our valued patient and/or caregiver please thoroughly review this After Visit Summary regarding stroke discharge information. Also, follow up with your healthcare provider as advised.      Activation of 911 Emergency Medical System  Call 911 if your symptoms worsen or you begin to experience sudden new problems.    Follow-up After Discharge  Please review Follow-up After Discharge information provided in Your Next Steps portion of this After Visit Summary.    Medication Prescribed at Discharge  Please review Medication Prescribed at Discharge information provided in Discharge Medications portion of this After Visit Summary.    Risk Factors for Stroke  YOUR STROKE RISK FACTORS INCLUDE:   It is important that you know your individual risk factors and that you work with your doctor on treatment and lifestyle changes to lower your risk of having a future stroke.  Personal Stroke Risks: Non-Modifiable: Male, Race  Personal Stroke Risks: Modifiable: High blood pressure, Overweight/obesity  General Risk Factors for stroke include, but are not limited to health, lifestyle, family history and metabolic syndrome.  Health Risk could include: high blood pressure, overweight, unhealthy cholesterol, atrial fibrillation, diabetes  Lifestyle Risk could include: sedative lifestyle, diet, smoking, more than 2 alcoholic drink per day   Family History could include: over age of 60, parent, brother or sister has had a stroke  Metabolic Syndrome: Having 3 or more of stroke risk factors    Signs and Symptoms of Stroke   Warning Signs and Symptoms of Stroke include, but are not limited B.E.F.A.S.T. (Balance, Eyes, Face, Arms, Speech, Time).  B. E.  F.A.S.T.  BALANCE-Sudden loss of coordination or balance  EYES-Sudden change in vision  FACE-Sudden weakness on one side of the face or facial droop  ARM-Sudden arm or leg weakness or numbness  SPEECH-Sudden slurred speech, trouble speaking, trouble understanding speech  TERRIBLE  HEADACHE-Sudden onset of a terrible headache    Smoking  Avoid all tobacco products and secondhand smoke. Smoking can damage blood vessels and raise blood pressure, which contributes or worsen heart and lung disease, stroke, and aneurysm. Smoking cessation counseling offered at [place] [facility]oll [facility]uit [facility] [phone], [place] Toll [facility]uit [facility] [phone]-.       23-May-2019 23:05

## 2024-08-09 ENCOUNTER — APPOINTMENT (OUTPATIENT)
Dept: CARDIOLOGY | Facility: CLINIC | Age: 79
End: 2024-08-09

## 2024-08-09 PROCEDURE — 99214 OFFICE O/P EST MOD 30 MIN: CPT

## 2024-08-09 PROCEDURE — G2211 COMPLEX E/M VISIT ADD ON: CPT

## 2024-08-09 PROCEDURE — 93000 ELECTROCARDIOGRAM COMPLETE: CPT

## 2024-08-09 NOTE — PHYSICAL EXAM
[Normal S1, S2] : normal S1, S2 [Soft] : abdomen soft [Non Tender] : non-tender [Normal Gait] : normal gait [No Edema] : no edema [Normal] : moves all extremities, no focal deficits, normal speech [Alert and Oriented] : alert and oriented [de-identified] : overweight

## 2024-08-09 NOTE — DISCUSSION/SUMMARY
[FreeTextEntry1] :  Echo reviewed NL LV FX no significant valvular disease, CC Non obstructive disease  Will follow with EP for ILR monitoring     CAD: Stable asymptomatic CW ASA/Statin. Will repeat Echo  CKD: Suggest renal follow up states he will call to schedule a visit   Sleep apnea CW CPAP treatment Pt will follow up with Pulmonary  HTN: Controlled with no orthostasis   f/u 1 month     [EKG obtained to assist in diagnosis and management of assessed problem(s)] : EKG obtained to assist in diagnosis and management of assessed problem(s)

## 2024-08-09 NOTE — HISTORY OF PRESENT ILLNESS
[FreeTextEntry1] : 77 y/o male PMH:  HTN, HLD, BPH, bladder CA, CKD, obesity, YO on CPAP here today history of syncope. He reports lightheadedness daily.   Denies palpitaitons, chest pain. He reports dyspnea with exertion.   Echo 5/8/23:  Estimated left ventricular ejection fraction is 55-60 %. The left ventricle is normal in size, wall thickness, wall motion and contractility as seen in limited views. The left atrium is moderately dilated. The aortic valve is trileaflet with thin pliable leaflets. The mitral valve leaflets appear thin and normal. Mild mitral annular calcification is present. Mild to Moderate mitral regurgitation is present. EA reversal of the mitral inflow consistent with reduced compliance of the left ventricle. No evidence of pericardial effusion.   Cardiac cath 9/2022    LMCA: Mild atherosclerosis LAD: Diffuse mild to moderate atherosclerosis Prox CX: 40% stenosis. Prox RCA: 40% stenosis.    Diagnostic Conclusions    Non-obstructive coronary artery disease with filling pressures within  normal limits and adequate perfusion.

## 2024-08-15 DIAGNOSIS — Z13.29 ENCOUNTER FOR SCREENING FOR OTHER SUSPECTED ENDOCRINE DISORDER: ICD-10-CM

## 2024-08-17 ENCOUNTER — EMERGENCY (EMERGENCY)
Facility: HOSPITAL | Age: 79
LOS: 0 days | Discharge: HOME | End: 2024-08-17
Attending: EMERGENCY MEDICINE
Payer: MEDICARE

## 2024-08-17 VITALS
HEART RATE: 89 BPM | TEMPERATURE: 98 F | RESPIRATION RATE: 19 BRPM | SYSTOLIC BLOOD PRESSURE: 160 MMHG | OXYGEN SATURATION: 97 % | DIASTOLIC BLOOD PRESSURE: 77 MMHG

## 2024-08-17 VITALS — HEIGHT: 68 IN | WEIGHT: 246.48 LBS

## 2024-08-17 DIAGNOSIS — Z98.890 OTHER SPECIFIED POSTPROCEDURAL STATES: Chronic | ICD-10-CM

## 2024-08-17 DIAGNOSIS — E78.5 HYPERLIPIDEMIA, UNSPECIFIED: ICD-10-CM

## 2024-08-17 DIAGNOSIS — M19.90 UNSPECIFIED OSTEOARTHRITIS, UNSPECIFIED SITE: ICD-10-CM

## 2024-08-17 DIAGNOSIS — M79.671 PAIN IN RIGHT FOOT: ICD-10-CM
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
## 2024-08-17 DIAGNOSIS — Z87.442 PERSONAL HISTORY OF URINARY CALCULI: ICD-10-CM

## 2024-08-17 DIAGNOSIS — I10 ESSENTIAL (PRIMARY) HYPERTENSION: ICD-10-CM

## 2024-08-17 DIAGNOSIS — N40.0 BENIGN PROSTATIC HYPERPLASIA WITHOUT LOWER URINARY TRACT SYMPTOMS: ICD-10-CM
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
## 2024-08-17 DIAGNOSIS — I34.0 NONRHEUMATIC MITRAL (VALVE) INSUFFICIENCY: ICD-10-CM

## 2024-08-17 DIAGNOSIS — E78.00 PURE HYPERCHOLESTEROLEMIA, UNSPECIFIED: ICD-10-CM

## 2024-08-17 PROCEDURE — 73630 X-RAY EXAM OF FOOT: CPT | Mod: 26,RT

## 2024-08-17 PROCEDURE — 99284 EMERGENCY DEPT VISIT MOD MDM: CPT | Mod: FS

## 2024-08-17 PROCEDURE — 73630 X-RAY EXAM OF FOOT: CPT | Mod: RT

## 2024-08-17 PROCEDURE — 99283 EMERGENCY DEPT VISIT LOW MDM: CPT | Mod: 25

## 2024-08-17 RX ORDER — ACETAMINOPHEN 500 MG/5ML
650 LIQUID (ML) ORAL ONCE
Refills: 0 | Status: COMPLETED | OUTPATIENT
Start: 2024-08-17 | End: 2024-08-17

## 2024-08-17 RX ADMIN — Medication 650 MILLIGRAM(S): at 10:40

## 2024-08-20 ENCOUNTER — APPOINTMENT (OUTPATIENT)
Dept: INTERNAL MEDICINE | Facility: CLINIC | Age: 79
End: 2024-08-20
Payer: MEDICARE

## 2024-08-20 ENCOUNTER — APPOINTMENT (OUTPATIENT)
Dept: PULMONOLOGY | Facility: CLINIC | Age: 79
End: 2024-08-20

## 2024-08-20 VITALS
SYSTOLIC BLOOD PRESSURE: 140 MMHG | HEART RATE: 85 BPM | HEIGHT: 68 IN | BODY MASS INDEX: 36.68 KG/M2 | TEMPERATURE: 98.2 F | DIASTOLIC BLOOD PRESSURE: 72 MMHG | RESPIRATION RATE: 16 BRPM | WEIGHT: 242 LBS | OXYGEN SATURATION: 95 %

## 2024-08-20 DIAGNOSIS — R06.02 SHORTNESS OF BREATH: ICD-10-CM

## 2024-08-20 PROCEDURE — ZZZZZ: CPT

## 2024-08-20 PROCEDURE — 99204 OFFICE O/P NEW MOD 45 MIN: CPT

## 2024-08-21 ENCOUNTER — APPOINTMENT (OUTPATIENT)
Dept: ULTRASOUND IMAGING | Facility: CLINIC | Age: 79
End: 2024-08-21

## 2024-08-21 ENCOUNTER — APPOINTMENT (OUTPATIENT)
Dept: ORTHOPEDIC SURGERY | Facility: CLINIC | Age: 79
End: 2024-08-21

## 2024-08-21 VITALS
WEIGHT: 248 LBS | HEIGHT: 68 IN | HEART RATE: 98 BPM | DIASTOLIC BLOOD PRESSURE: 98 MMHG | BODY MASS INDEX: 37.59 KG/M2 | SYSTOLIC BLOOD PRESSURE: 158 MMHG

## 2024-08-21 DIAGNOSIS — I47.29 OTHER VENTRICULAR TACHYCARDIA: ICD-10-CM

## 2024-08-21 DIAGNOSIS — S93.401A SPRAIN OF UNSPECIFIED LIGAMENT OF RIGHT ANKLE, INITIAL ENCOUNTER: ICD-10-CM

## 2024-08-21 PROCEDURE — 99213 OFFICE O/P EST LOW 20 MIN: CPT

## 2024-08-21 PROCEDURE — 73600 X-RAY EXAM OF ANKLE: CPT | Mod: RT

## 2024-08-21 RX ORDER — METOPROLOL SUCCINATE 25 MG/1
25 TABLET, EXTENDED RELEASE ORAL
Qty: 90 | Refills: 1 | Status: ACTIVE | COMMUNITY
Start: 2024-08-21 | End: 1900-01-01

## 2024-08-22 ENCOUNTER — APPOINTMENT (OUTPATIENT)
Dept: ELECTROPHYSIOLOGY | Facility: CLINIC | Age: 79
End: 2024-08-22
Payer: MEDICARE

## 2024-08-22 VITALS
HEART RATE: 93 BPM | BODY MASS INDEX: 37.59 KG/M2 | HEIGHT: 68 IN | SYSTOLIC BLOOD PRESSURE: 160 MMHG | DIASTOLIC BLOOD PRESSURE: 90 MMHG | WEIGHT: 248 LBS | OXYGEN SATURATION: 99 %

## 2024-08-22 DIAGNOSIS — R55 SYNCOPE AND COLLAPSE: ICD-10-CM

## 2024-08-22 PROCEDURE — 93298 REM INTERROG DEV EVAL SCRMS: CPT

## 2024-08-22 PROCEDURE — 99212 OFFICE O/P EST SF 10 MIN: CPT

## 2024-08-23 ENCOUNTER — OUTPATIENT (OUTPATIENT)
Dept: OUTPATIENT SERVICES | Facility: HOSPITAL | Age: 79
LOS: 1 days | End: 2024-08-23
Payer: MEDICARE

## 2024-08-23 ENCOUNTER — RESULT REVIEW (OUTPATIENT)
Age: 79
End: 2024-08-23

## 2024-08-23 DIAGNOSIS — R06.09 OTHER FORMS OF DYSPNEA: ICD-10-CM

## 2024-08-23 DIAGNOSIS — Z98.890 OTHER SPECIFIED POSTPROCEDURAL STATES: Chronic | ICD-10-CM

## 2024-08-23 DIAGNOSIS — R06.02 SHORTNESS OF BREATH: ICD-10-CM

## 2024-08-23 PROCEDURE — 71046 X-RAY EXAM CHEST 2 VIEWS: CPT

## 2024-08-23 PROCEDURE — A9540: CPT

## 2024-08-23 PROCEDURE — 71046 X-RAY EXAM CHEST 2 VIEWS: CPT | Mod: 26

## 2024-08-23 PROCEDURE — 78582 LUNG VENTILAT&PERFUS IMAGING: CPT | Mod: 26,MH

## 2024-08-23 PROCEDURE — A9567: CPT

## 2024-08-23 PROCEDURE — 78582 LUNG VENTILAT&PERFUS IMAGING: CPT

## 2024-08-24 DIAGNOSIS — R06.02 SHORTNESS OF BREATH: ICD-10-CM

## 2024-08-24 DIAGNOSIS — R06.09 OTHER FORMS OF DYSPNEA: ICD-10-CM

## 2024-08-24 NOTE — PHYSICAL EXAM
[No Acute Distress] : no acute distress [Normal Oropharynx] : normal oropharynx [Normal Appearance] : normal appearance [No Neck Mass] : no neck mass [Normal Rate/Rhythm] : normal rate/rhythm [Normal S1, S2] : normal s1, s2 [No Murmurs] : no murmurs [No Resp Distress] : no resp distress [No Abnormalities] : no abnormalities [Clear to Auscultation Bilaterally] : clear to auscultation bilaterally [Normal Gait] : normal gait [No Clubbing] : no clubbing [No Cyanosis] : no cyanosis [No Edema] : no edema [FROM] : FROM [Normal Color/ Pigmentation] : normal color/ pigmentation [No Focal Deficits] : no focal deficits [Normal Affect] : normal affect [Oriented x3] : oriented x3

## 2024-08-24 NOTE — ASSESSMENT
[FreeTextEntry1] : 78 years old male with history of YO on CPAP, chronic kidney disease, small granuloma of the lung returns for follow-up. Recent episode of syncope Short run of V. tach on event monitor and worsening exertional shortness of breath. Patient is not on anticoagulation. He denies lower extremity swelling. Pulmonary function test show moderate reduction in DLCO which is normal and mild reduction in total lung capacity that could be secondary to obesity.  Problems. Exertional dyspnea likely cardiac etiology patient has short run of V. tach syncopal episode in recent past. Pulmonary embolism is unlikely but would like to rule out. Obesity. Sleep apnea. Mild restrictive pattern Plan Chest x-ray VQ scan ordered.I have reviewed results shows very low probability Follow-up in 2 months or earlier if symptomatic

## 2024-08-24 NOTE — HISTORY OF PRESENT ILLNESS
[Former] : former [Never] : never [Obstructive Sleep Apnea] : obstructive sleep apnea [TextBox_4] : 78 years old male with history of obesity sleep apnea on CPAP came for follow-up. Patient reports exertional dyspnea -with slight progression Former smoker quit in 1998 no recent upper or lower respiratory tract infection. Follows cardiology has nonobstructive coronary artery disease and no pulmonary hypertension Denies acute respiratory symptoms today he had pulmonary function testing and results were discussed with him He denies chest pain hemoptysis fever chills. No recent COVID infection. Patient reports good compliance with AutoPap.  PMH: HTN, HLD, BPH, bladder CA, CKD, obesity, YO on CPAP  [TextBox_19] : Compliant with CPAP treatment

## 2024-08-26 ENCOUNTER — RX RENEWAL (OUTPATIENT)
Age: 79
End: 2024-08-26

## 2024-08-26 NOTE — ADDENDUM
[FreeTextEntry1] : This note was written by Andreas Cavanaugh on 08/26/2024, acting as a scribe for Sunny Carballo III, MD

## 2024-08-26 NOTE — H&P ADULT - NEGATIVE GENERAL SYMPTOMS
Patient called stating that she is experiencing CP, chest tightness, SOB, and her HR is in the 40s. Current BP is 110/70. She has not taken her BB the last two nights due to low HR. We have not seen the patient since 12/21. Suggested patient go to the ER to have symptoms evaluated. She would like to set up appt with Terrell Grayson PA-C to re-establish as new patient.    no fever/no chills/no sweating/no anorexia

## 2024-08-26 NOTE — DISCUSSION/SUMMARY
[de-identified] : At this time, due to right lateral ankle sprain, I recommended a low-profile brace, weightbearing as tolerated, and reassessment in 3-4 weeks.

## 2024-08-26 NOTE — HISTORY OF PRESENT ILLNESS
[de-identified] : The patient comes in today with complaints of pain to his right foot.  He had an inversion-type injury.  He complains of pain and swelling.

## 2024-08-26 NOTE — PHYSICAL EXAM
[de-identified] : Left Ankle: Range of Motion in Degrees: 	                                Claimant:	                Normal:	 Dorsiflexion (Active)	40-degrees	40-degrees	 Dorsiflexion (Passive)	40-degrees	40-degrees	 Plantar (Active)	                40-degrees	40-degrees	 Plantar (Passive)	                40-degrees	40-degrees	 Inversion (Active)	                30-degrees	30-degrees	 Inversion (Passive)	                30-degrees	30-degrees	 Eversion  (Active)	                20-degrees	20-degrees	 Eversion (Passive) 	                20-degrees	20-degrees	  No weakness in dorsiflexion, plantar flexion, inversion or eversion.  Normal sensation.  No tenderness over the medial or lateral ligaments.  No tenderness over the DLES.  No evidence of instability.  Negative anterior drawer sign.  No medial or lateral bony tenderness.  No proximal fibular tenderness.  No anterior, posterior, medial or lateral tendon tenderness.  No intra-articular swelling.  No extra-articular swelling, edema or tenderness.  No tenderness over the plantar aspect of the os calcis.  2+ DP and PT pulses. Skin is intact.  No rashes, scars or lesions.     Right Ankle: Range of Motion in Degrees:                                    Claimant: Normal:  Dorsiflexion (Active) 40-degrees 40-degrees  Dorsiflexion (Passive) 40-degrees 40-degrees  Plantar (Active)        40-degrees 40-degrees  Plantar (Passive)      40-degrees 40-degrees  Inversion (Active)     30-degrees 30-degrees  Inversion (Passive)   30-degrees 30-degrees  Eversion  (Active)    20-degrees 20-degrees  Eversion (Passive)    20-degrees 20-degrees    Tenderness over the distal peroneal tendons, but no evidence of subluxation.  No weakness in dorsiflexion, plantar flexion, inversion or eversion.  Normal sensation.  Positive diffuse swelling laterally.  Tender over the lateral ligaments.  No tenderness over the medial ligaments.  No evidence of instability.  Negative anterior drawer sign.  No medial bony tenderness.  No proximal fibular tenderness.  No anterior, posterior, medial or lateral tendon tenderness.  No intra-articular swelling.  No extra-articular swelling, edema or tenderness.  No tenderness over the plantar aspect of the os calcis.  2+ DP and PT pulses. Skin is intact.  No rashes, scars or lesions.      [de-identified] : Gait and Station:  Ambulating with a slightly antalgic to antalgic gait.  Normal Station.  [de-identified] : Appearance:  Well-developed, well-nourished male in no acute distress.   [de-identified] : Radiographs, two views of the right ankle taken in the office today, show no obvious osseous abnormalities.

## 2024-08-27 ENCOUNTER — NON-APPOINTMENT (OUTPATIENT)
Age: 79
End: 2024-08-27

## 2024-08-28 ENCOUNTER — NON-APPOINTMENT (OUTPATIENT)
Age: 79
End: 2024-08-28

## 2024-08-28 DIAGNOSIS — D18.01 HEMANGIOMA OF SKIN AND SUBCUTANEOUS TISSUE: ICD-10-CM

## 2024-08-28 DIAGNOSIS — Z86.19 PERSONAL HISTORY OF OTHER INFECTIOUS AND PARASITIC DISEASES: ICD-10-CM

## 2024-08-28 DIAGNOSIS — L30.4 ERYTHEMA INTERTRIGO: ICD-10-CM

## 2024-08-28 DIAGNOSIS — L82.1 OTHER SEBORRHEIC KERATOSIS: ICD-10-CM

## 2024-08-28 DIAGNOSIS — L72.9 FOLLICULAR CYST OF THE SKIN AND SUBCUTANEOUS TISSUE, UNSPECIFIED: ICD-10-CM

## 2024-08-28 RX ORDER — FLUTICASONE PROPIONATE 50 MCG
50 SPRAY, SUSPENSION NASAL
Refills: 0 | Status: ACTIVE | COMMUNITY

## 2024-08-28 RX ORDER — POTASSIUM CITRATE 15 MEQ/1
15 MEQ TABLET, EXTENDED RELEASE ORAL
Refills: 0 | Status: ACTIVE | COMMUNITY

## 2024-08-28 RX ORDER — MULTIVIT-MIN/FA/LYCOPEN/LUTEIN .4-300-25
TABLET ORAL
Refills: 0 | Status: ACTIVE | COMMUNITY

## 2024-08-30 ENCOUNTER — OUTPATIENT (OUTPATIENT)
Dept: OUTPATIENT SERVICES | Facility: HOSPITAL | Age: 79
LOS: 1 days | End: 2024-08-30
Payer: MEDICARE

## 2024-08-30 ENCOUNTER — APPOINTMENT (OUTPATIENT)
Dept: ULTRASOUND IMAGING | Facility: CLINIC | Age: 79
End: 2024-08-30
Payer: MEDICARE

## 2024-08-30 ENCOUNTER — RX RENEWAL (OUTPATIENT)
Age: 79
End: 2024-08-30

## 2024-08-30 DIAGNOSIS — Z98.890 OTHER SPECIFIED POSTPROCEDURAL STATES: Chronic | ICD-10-CM

## 2024-08-30 DIAGNOSIS — R06.09 OTHER FORMS OF DYSPNEA: ICD-10-CM

## 2024-08-30 PROCEDURE — 93970 EXTREMITY STUDY: CPT

## 2024-08-30 PROCEDURE — 93970 EXTREMITY STUDY: CPT | Mod: 26

## 2024-09-01 PROBLEM — G47.33 OBSTRUCTIVE SLEEP APNEA, ADULT: Status: ACTIVE | Noted: 2024-09-01

## 2024-09-10 ENCOUNTER — APPOINTMENT (OUTPATIENT)
Dept: DERMATOLOGY | Facility: CLINIC | Age: 79
End: 2024-09-10

## 2024-09-11 ENCOUNTER — APPOINTMENT (OUTPATIENT)
Dept: ORTHOPEDIC SURGERY | Facility: CLINIC | Age: 79
End: 2024-09-11

## 2024-09-11 DIAGNOSIS — S93.401A SPRAIN OF UNSPECIFIED LIGAMENT OF RIGHT ANKLE, INITIAL ENCOUNTER: ICD-10-CM

## 2024-09-11 PROCEDURE — 99213 OFFICE O/P EST LOW 20 MIN: CPT

## 2024-09-17 VITALS — HEIGHT: 68 IN | BODY MASS INDEX: 37.59 KG/M2 | WEIGHT: 248 LBS

## 2024-09-17 NOTE — ADDENDUM
[FreeTextEntry1] : This note was written by Andreas Cavanaugh on 09/17/2024, acting as a scribe for Sunny Carballo III, MD

## 2024-09-17 NOTE — PHYSICAL EXAM
[de-identified] : Right Ankle: Range of Motion in Degrees:                                    Claimant: Normal:  Dorsiflexion (Active) 40-degrees 40-degrees  Dorsiflexion (Passive) 40-degrees 40-degrees  Plantar (Active)        40-degrees 40-degrees  Plantar (Passive)      40-degrees 40-degrees  Inversion (Active)     30-degrees 30-degrees  Inversion (Passive)   30-degrees 30-degrees  Eversion  (Active)    20-degrees 20-degrees  Eversion (Passive)    20-degrees 20-degrees    No weakness in dorsiflexion, plantar flexion, inversion or eversion.  Normal sensation.  Positive diffuse swelling laterally.  Tender over the lateral ligaments.  No tenderness over the medial ligaments.  No tenderness over the DLES.  No evidence of instability.  Negative anterior drawer sign.  No medial bony tenderness.  No proximal fibular tenderness.  No anterior, posterior, medial or lateral tendon tenderness.  No intra-articular swelling.  No extra-articular swelling, edema or tenderness.  No tenderness over the plantar aspect of the os calcis.  2+ DP and PT pulses. Skin is intact.  No rashes, scars or lesions.      [de-identified] : Gait and Station:  Ambulating with a slightly antalgic to antalgic gait.  Normal Station.  [de-identified] : Appearance:  Well-developed, well-nourished male in no acute distress.

## 2024-09-17 NOTE — HISTORY OF PRESENT ILLNESS
[de-identified] : The patient comes in today for his right ankle.  He states he is feeling a lot better.

## 2024-09-17 NOTE — DISCUSSION/SUMMARY
[de-identified] : At this time, due to improving right lateral ankle sprain, I recommended to continue wearing the brace for an additional 2 weeks and then wean off the brace.  He will be followed up in 6 weeks should his symptoms warrant.

## 2024-09-25 ENCOUNTER — NON-APPOINTMENT (OUTPATIENT)
Age: 79
End: 2024-09-25

## 2024-09-26 ENCOUNTER — APPOINTMENT (OUTPATIENT)
Dept: ELECTROPHYSIOLOGY | Facility: CLINIC | Age: 79
End: 2024-09-26
Payer: MEDICARE

## 2024-09-26 PROCEDURE — 93298 REM INTERROG DEV EVAL SCRMS: CPT

## 2024-10-02 ENCOUNTER — APPOINTMENT (OUTPATIENT)
Dept: CARDIOLOGY | Facility: CLINIC | Age: 79
End: 2024-10-02
Payer: MEDICARE

## 2024-10-02 PROCEDURE — 93306 TTE W/DOPPLER COMPLETE: CPT

## 2024-10-29 DIAGNOSIS — Z23 ENCOUNTER FOR IMMUNIZATION: ICD-10-CM

## 2024-10-31 ENCOUNTER — NON-APPOINTMENT (OUTPATIENT)
Age: 79
End: 2024-10-31

## 2024-10-31 ENCOUNTER — APPOINTMENT (OUTPATIENT)
Dept: ELECTROPHYSIOLOGY | Facility: CLINIC | Age: 79
End: 2024-10-31

## 2024-10-31 PROCEDURE — 93298 REM INTERROG DEV EVAL SCRMS: CPT

## 2024-11-08 ENCOUNTER — NON-APPOINTMENT (OUTPATIENT)
Age: 79
End: 2024-11-08

## 2024-11-08 ENCOUNTER — APPOINTMENT (OUTPATIENT)
Dept: CARDIOLOGY | Facility: CLINIC | Age: 79
End: 2024-11-08
Payer: MEDICARE

## 2024-11-08 VITALS
DIASTOLIC BLOOD PRESSURE: 58 MMHG | BODY MASS INDEX: 37.44 KG/M2 | WEIGHT: 247 LBS | SYSTOLIC BLOOD PRESSURE: 130 MMHG | OXYGEN SATURATION: 95 % | HEIGHT: 68 IN | HEART RATE: 89 BPM

## 2024-11-08 DIAGNOSIS — I10 ESSENTIAL (PRIMARY) HYPERTENSION: ICD-10-CM

## 2024-11-08 DIAGNOSIS — R06.09 OTHER FORMS OF DYSPNEA: ICD-10-CM

## 2024-11-08 DIAGNOSIS — I34.0 NONRHEUMATIC MITRAL (VALVE) INSUFFICIENCY: ICD-10-CM

## 2024-11-08 DIAGNOSIS — E78.2 MIXED HYPERLIPIDEMIA: ICD-10-CM

## 2024-11-08 PROCEDURE — 99214 OFFICE O/P EST MOD 30 MIN: CPT

## 2024-11-08 PROCEDURE — 93000 ELECTROCARDIOGRAM COMPLETE: CPT

## 2024-11-08 PROCEDURE — G2211 COMPLEX E/M VISIT ADD ON: CPT

## 2024-11-18 ENCOUNTER — APPOINTMENT (OUTPATIENT)
Dept: PULMONOLOGY | Facility: CLINIC | Age: 79
End: 2024-11-18

## 2024-11-19 DIAGNOSIS — Z23 ENCOUNTER FOR IMMUNIZATION: ICD-10-CM

## 2024-11-20 ENCOUNTER — RX RENEWAL (OUTPATIENT)
Age: 79
End: 2024-11-20

## 2024-12-05 ENCOUNTER — NON-APPOINTMENT (OUTPATIENT)
Age: 79
End: 2024-12-05

## 2024-12-05 ENCOUNTER — APPOINTMENT (OUTPATIENT)
Dept: ELECTROPHYSIOLOGY | Facility: CLINIC | Age: 79
End: 2024-12-05
Payer: MEDICARE

## 2024-12-05 PROCEDURE — 93298 REM INTERROG DEV EVAL SCRMS: CPT

## 2025-01-01 NOTE — PATIENT PROFILE ADULT - NSPROGENSOURCEINFO_GEN_A_NUR
Nonbillable note  Patient seen and examined at bedside, noted to be drowsy but arousable.  Extremely hard of hearing.  Discussed with nursing at bedside.  Resume patient's home opioid regimen but holding off for now while he is agitated.  Administered 0.5 mg of Haldol followed by a second dose secondary to agitation.   patient

## 2025-01-09 ENCOUNTER — NON-APPOINTMENT (OUTPATIENT)
Age: 80
End: 2025-01-09

## 2025-01-09 ENCOUNTER — APPOINTMENT (OUTPATIENT)
Dept: ELECTROPHYSIOLOGY | Facility: CLINIC | Age: 80
End: 2025-01-09
Payer: MEDICARE

## 2025-01-09 PROCEDURE — 93298 REM INTERROG DEV EVAL SCRMS: CPT

## 2025-01-13 ENCOUNTER — RX RENEWAL (OUTPATIENT)
Age: 80
End: 2025-01-13

## 2025-01-29 ENCOUNTER — APPOINTMENT (OUTPATIENT)
Dept: DERMATOLOGY | Facility: CLINIC | Age: 80
End: 2025-01-29
Payer: MEDICARE

## 2025-01-29 DIAGNOSIS — L72.0 EPIDERMAL CYST: ICD-10-CM

## 2025-01-29 DIAGNOSIS — L82.1 OTHER SEBORRHEIC KERATOSIS: ICD-10-CM

## 2025-01-29 DIAGNOSIS — L30.4 ERYTHEMA INTERTRIGO: ICD-10-CM

## 2025-01-29 PROCEDURE — 99203 OFFICE O/P NEW LOW 30 MIN: CPT

## 2025-01-29 PROCEDURE — 99213 OFFICE O/P EST LOW 20 MIN: CPT

## 2025-01-29 RX ORDER — KETOCONAZOLE 20 MG/G
2 CREAM TOPICAL TWICE DAILY
Qty: 1 | Refills: 1 | Status: ACTIVE | COMMUNITY
Start: 2025-01-29 | End: 1900-01-01

## 2025-01-29 RX ORDER — HYDROCORTISONE 25 MG/G
2.5 CREAM TOPICAL TWICE DAILY
Qty: 1 | Refills: 1 | Status: ACTIVE | COMMUNITY
Start: 2025-01-29 | End: 1900-01-01

## 2025-02-03 ENCOUNTER — RX RENEWAL (OUTPATIENT)
Age: 80
End: 2025-02-03

## 2025-02-06 NOTE — PATIENT PROFILE ADULT - FUNCTIONAL ASSESSMENT - BASIC MOBILITY 5.
3 = A little assistance Detail Level: None Performed By: Karina Hyatt CST Urine Pregnancy Test Result: negative

## 2025-02-13 ENCOUNTER — APPOINTMENT (OUTPATIENT)
Dept: FAMILY MEDICINE | Facility: CLINIC | Age: 80
End: 2025-02-13

## 2025-02-13 ENCOUNTER — APPOINTMENT (OUTPATIENT)
Dept: ELECTROPHYSIOLOGY | Facility: CLINIC | Age: 80
End: 2025-02-13

## 2025-02-13 ENCOUNTER — NON-APPOINTMENT (OUTPATIENT)
Age: 80
End: 2025-02-13

## 2025-02-13 PROCEDURE — 93298 REM INTERROG DEV EVAL SCRMS: CPT

## 2025-02-18 ENCOUNTER — RX RENEWAL (OUTPATIENT)
Age: 80
End: 2025-02-18

## 2025-02-26 ENCOUNTER — APPOINTMENT (OUTPATIENT)
Dept: FAMILY MEDICINE | Facility: CLINIC | Age: 80
End: 2025-02-26
Payer: MEDICARE

## 2025-02-26 VITALS
OXYGEN SATURATION: 95 % | TEMPERATURE: 98.1 F | SYSTOLIC BLOOD PRESSURE: 140 MMHG | WEIGHT: 242 LBS | BODY MASS INDEX: 36.68 KG/M2 | HEART RATE: 86 BPM | DIASTOLIC BLOOD PRESSURE: 60 MMHG | HEIGHT: 68 IN

## 2025-02-26 DIAGNOSIS — Z86.018 PERSONAL HISTORY OF OTHER BENIGN NEOPLASM: ICD-10-CM

## 2025-02-26 DIAGNOSIS — F41.9 ANXIETY DISORDER, UNSPECIFIED: ICD-10-CM

## 2025-02-26 DIAGNOSIS — G47.33 OBSTRUCTIVE SLEEP APNEA (ADULT) (PEDIATRIC): ICD-10-CM

## 2025-02-26 DIAGNOSIS — Z87.440 PERSONAL HISTORY OF URINARY (TRACT) INFECTIONS: ICD-10-CM

## 2025-02-26 DIAGNOSIS — Z86.19 PERSONAL HISTORY OF OTHER INFECTIOUS AND PARASITIC DISEASES: ICD-10-CM

## 2025-02-26 DIAGNOSIS — Z87.2 PERSONAL HISTORY OF DISEASES OF THE SKIN AND SUBCUTANEOUS TISSUE: ICD-10-CM

## 2025-02-26 DIAGNOSIS — Z98.890 OTHER SPECIFIED POSTPROCEDURAL STATES: ICD-10-CM

## 2025-02-26 DIAGNOSIS — L72.0 EPIDERMAL CYST: ICD-10-CM

## 2025-02-26 DIAGNOSIS — M70.61 TROCHANTERIC BURSITIS, RIGHT HIP: ICD-10-CM

## 2025-02-26 DIAGNOSIS — Z86.79 PERSONAL HISTORY OF OTHER DISEASES OF THE CIRCULATORY SYSTEM: ICD-10-CM

## 2025-02-26 DIAGNOSIS — M70.62 TROCHANTERIC BURSITIS, LEFT HIP: ICD-10-CM

## 2025-02-26 DIAGNOSIS — L30.4 ERYTHEMA INTERTRIGO: ICD-10-CM

## 2025-02-26 DIAGNOSIS — E88.810 METABOLIC SYNDROME: ICD-10-CM

## 2025-02-26 DIAGNOSIS — Z87.898 PERSONAL HISTORY OF OTHER SPECIFIED CONDITIONS: ICD-10-CM

## 2025-02-26 DIAGNOSIS — N50.82 SCROTAL PAIN: ICD-10-CM

## 2025-02-26 DIAGNOSIS — S93.401A SPRAIN OF UNSPECIFIED LIGAMENT OF RIGHT ANKLE, INITIAL ENCOUNTER: ICD-10-CM

## 2025-02-26 DIAGNOSIS — M75.42 IMPINGEMENT SYNDROME OF LEFT SHOULDER: ICD-10-CM

## 2025-02-26 DIAGNOSIS — Z80.1 FAMILY HISTORY OF MALIGNANT NEOPLASM OF TRACHEA, BRONCHUS AND LUNG: ICD-10-CM

## 2025-02-26 DIAGNOSIS — D64.9 ANEMIA, UNSPECIFIED: ICD-10-CM

## 2025-02-26 DIAGNOSIS — N18.30 CHRONIC KIDNEY DISEASE, STAGE 3 UNSPECIFIED: ICD-10-CM

## 2025-02-26 DIAGNOSIS — Z13.29 ENCOUNTER FOR SCREENING FOR OTHER SUSPECTED ENDOCRINE DISORDER: ICD-10-CM

## 2025-02-26 DIAGNOSIS — Z95.818 PRESENCE OF OTHER CARDIAC IMPLANTS AND GRAFTS: ICD-10-CM

## 2025-02-26 DIAGNOSIS — R06.09 OTHER FORMS OF DYSPNEA: ICD-10-CM

## 2025-02-26 PROCEDURE — 99214 OFFICE O/P EST MOD 30 MIN: CPT

## 2025-02-26 PROCEDURE — G2211 COMPLEX E/M VISIT ADD ON: CPT

## 2025-02-28 ENCOUNTER — NON-APPOINTMENT (OUTPATIENT)
Age: 80
End: 2025-02-28

## 2025-02-28 ENCOUNTER — APPOINTMENT (OUTPATIENT)
Dept: CARDIOLOGY | Facility: CLINIC | Age: 80
End: 2025-02-28
Payer: MEDICARE

## 2025-02-28 VITALS
BODY MASS INDEX: 36.37 KG/M2 | HEART RATE: 80 BPM | WEIGHT: 240 LBS | HEIGHT: 68 IN | OXYGEN SATURATION: 95 % | DIASTOLIC BLOOD PRESSURE: 66 MMHG | SYSTOLIC BLOOD PRESSURE: 130 MMHG

## 2025-02-28 DIAGNOSIS — R79.89 OTHER SPECIFIED ABNORMAL FINDINGS OF BLOOD CHEMISTRY: ICD-10-CM

## 2025-02-28 DIAGNOSIS — R60.0 LOCALIZED EDEMA: ICD-10-CM

## 2025-02-28 DIAGNOSIS — E78.2 MIXED HYPERLIPIDEMIA: ICD-10-CM

## 2025-02-28 DIAGNOSIS — I10 ESSENTIAL (PRIMARY) HYPERTENSION: ICD-10-CM

## 2025-02-28 DIAGNOSIS — I34.0 NONRHEUMATIC MITRAL (VALVE) INSUFFICIENCY: ICD-10-CM

## 2025-02-28 PROCEDURE — G2211 COMPLEX E/M VISIT ADD ON: CPT

## 2025-02-28 PROCEDURE — 93000 ELECTROCARDIOGRAM COMPLETE: CPT

## 2025-02-28 PROCEDURE — 99214 OFFICE O/P EST MOD 30 MIN: CPT

## 2025-02-28 RX ORDER — AMLODIPINE BESYLATE 2.5 MG/1
2.5 TABLET ORAL DAILY
Refills: 0 | Status: ACTIVE | COMMUNITY

## 2025-02-28 RX ORDER — FLUTICASONE PROPIONATE 50 MCG
50 SPRAY, SUSPENSION NASAL
Refills: 0 | Status: ACTIVE | COMMUNITY

## 2025-02-28 RX ORDER — LOSARTAN POTASSIUM 25 MG/1
25 TABLET, FILM COATED ORAL DAILY
Refills: 0 | Status: ACTIVE | COMMUNITY

## 2025-02-28 RX ORDER — ATORVASTATIN CALCIUM 20 MG/1
20 TABLET, FILM COATED ORAL DAILY
Refills: 0 | Status: ACTIVE | COMMUNITY

## 2025-02-28 RX ORDER — TIRZEPATIDE 12.5 MG/.5ML
12.5 INJECTION, SOLUTION SUBCUTANEOUS WEEKLY
Qty: 2 | Refills: 3 | Status: ACTIVE | COMMUNITY
Start: 2025-02-18 | End: 1900-01-01

## 2025-03-06 NOTE — DISCHARGE NOTE NURSING/CASE MANAGEMENT/SOCIAL WORK - NSDCVIVACCINE_GEN_ALL_CORE_FT
Subjective   Lj García is a 80 y.o. male.     History of Present Illness  CC: neck pain    Neck pain radiating to RUE, began around May 2018, treated by me since 9/13/18, worsening, 8/10 at worst, 4/10 at best, always present, varies, aching, dull, worse with lifting and lying down, interferes with activity and sleep, failed meds, surgery, chiropractor, PT. X-ray C-spine with DDD and DJD. Saw PCP, notes reviewed, as above, also with some improvement with MDP, referred for CESIs, no anticoagulation, has CKD3. Denies anticoagulation, allergy to iodine or contrast, current infection or ABX. Had 3 right CESIs, notes improvement, can sleep now, doing very well at present with Gabapentin 300mg BID. Had pain to R groin, resolved with steroid dose pack from PCP, declined LESIs at that time.   Back Pain  Pertinent negatives include no abdominal pain, bladder incontinence, chest pain, fever, numbness or weakness.   Neck Pain   Pertinent negatives include no chest pain, fever, numbness, trouble swallowing or weakness.        The following portions of the patient's history were reviewed and updated as appropriate: allergies, current medications, past family history, past medical history, past social history, past surgical history and problem list.    Review of Systems   Constitutional:  Negative for chills, fatigue and fever.   HENT:  Negative for hearing loss and trouble swallowing.    Eyes:  Negative for visual disturbance.   Respiratory:  Negative for shortness of breath.    Cardiovascular:  Negative for chest pain.   Gastrointestinal:  Negative for abdominal pain, constipation, diarrhea, nausea and vomiting.   Genitourinary:  Negative for urinary incontinence.   Musculoskeletal:  Positive for back pain and neck pain. Negative for arthralgias, joint swelling and myalgias.   Neurological:  Positive for headache. Negative for dizziness, weakness and numbness.       Objective   Physical Exam   Constitutional: He is  oriented to person, place, and time. He appears well-developed and well-nourished.   HENT:   Head: Normocephalic and atraumatic.   Eyes: Pupils are equal, round, and reactive to light.   Cardiovascular: Normal rate, regular rhythm and normal heart sounds.   Pulmonary/Chest: Breath sounds normal.   Abdominal: Soft. Bowel sounds are normal. He exhibits no distension. There is no abdominal tenderness.   Neurological: He is alert and oriented to person, place, and time. He has normal reflexes. He displays normal reflexes. No sensory deficit.   (+) spurlings to right   Psychiatric: His behavior is normal. Thought content normal.         Assessment & Plan   Diagnoses and all orders for this visit:    1. Chronic bilateral low back pain without sciatica (Primary)    2. Cervical radiculopathy    3. Bilateral hip pain    4. Chronic pain of left knee    5. Degeneration of intervertebral disc of cervical region    6. Diabetic peripheral neuropathy    7. Neck pain    8. Primary osteoarthritis of left knee        Reviewed MRI C-spine.  Had 3 right cervical ESIs, finished in 10/2018. Does not need to repeat at this time.   Began Gabapentin 300mg qHS, then BID, helps, and helps him sleep better also, increased to TID for worsening radicular pain, better, taking BID now. May try and taper off completely, provided instructions for doing this.  Completed PT with instruction on using his existing cervical traction machine.   RTC 6 months for f/u. May begin new cervical ESIs before then if necessary.                   No Vaccines Administered.

## 2025-03-19 ENCOUNTER — RX CHANGE (OUTPATIENT)
Age: 80
End: 2025-03-19

## 2025-03-19 RX ORDER — CHLORTHALIDONE 25 MG/1
1 TABLET ORAL
Refills: 0 | DISCHARGE
Start: 2025-03-19

## 2025-03-19 RX ORDER — CHLORTHALIDONE 25 MG/1
25 TABLET ORAL
Qty: 90 | Refills: 1 | Status: ACTIVE | COMMUNITY
Start: 1900-01-01 | End: 1900-01-01

## 2025-03-20 ENCOUNTER — NON-APPOINTMENT (OUTPATIENT)
Age: 80
End: 2025-03-20

## 2025-03-20 ENCOUNTER — APPOINTMENT (OUTPATIENT)
Dept: ELECTROPHYSIOLOGY | Facility: CLINIC | Age: 80
End: 2025-03-20
Payer: MEDICARE

## 2025-03-20 PROCEDURE — 93298 REM INTERROG DEV EVAL SCRMS: CPT

## 2025-03-27 ENCOUNTER — NON-APPOINTMENT (OUTPATIENT)
Age: 80
End: 2025-03-27

## 2025-04-04 ENCOUNTER — RX RENEWAL (OUTPATIENT)
Age: 80
End: 2025-04-04

## 2025-04-15 ENCOUNTER — RX RENEWAL (OUTPATIENT)
Age: 80
End: 2025-04-15

## 2025-04-21 ENCOUNTER — APPOINTMENT (OUTPATIENT)
Dept: ELECTROPHYSIOLOGY | Facility: CLINIC | Age: 80
End: 2025-04-21

## 2025-04-21 PROCEDURE — 93298 REM INTERROG DEV EVAL SCRMS: CPT

## 2025-04-23 ENCOUNTER — RX RENEWAL (OUTPATIENT)
Age: 80
End: 2025-04-23

## 2025-04-25 ENCOUNTER — NON-APPOINTMENT (OUTPATIENT)
Age: 80
End: 2025-04-25

## 2025-04-29 ENCOUNTER — RX RENEWAL (OUTPATIENT)
Age: 80
End: 2025-04-29

## 2025-04-29 ENCOUNTER — APPOINTMENT (OUTPATIENT)
Dept: ELECTROPHYSIOLOGY | Facility: CLINIC | Age: 80
End: 2025-04-29
Payer: MEDICARE

## 2025-04-29 ENCOUNTER — NON-APPOINTMENT (OUTPATIENT)
Age: 80
End: 2025-04-29

## 2025-04-29 VITALS
DIASTOLIC BLOOD PRESSURE: 59 MMHG | HEIGHT: 68 IN | WEIGHT: 245 LBS | SYSTOLIC BLOOD PRESSURE: 138 MMHG | HEART RATE: 82 BPM | BODY MASS INDEX: 37.13 KG/M2 | OXYGEN SATURATION: 100 %

## 2025-04-29 DIAGNOSIS — Z95.818 PRESENCE OF OTHER CARDIAC IMPLANTS AND GRAFTS: ICD-10-CM

## 2025-04-29 DIAGNOSIS — R42 DIZZINESS AND GIDDINESS: ICD-10-CM

## 2025-04-29 DIAGNOSIS — R00.1 BRADYCARDIA, UNSPECIFIED: ICD-10-CM

## 2025-04-29 PROCEDURE — 99204 OFFICE O/P NEW MOD 45 MIN: CPT

## 2025-04-29 PROCEDURE — G2211 COMPLEX E/M VISIT ADD ON: CPT

## 2025-04-29 PROCEDURE — 93285 PRGRMG DEV EVAL SCRMS IP: CPT

## 2025-04-29 PROCEDURE — 93000 ELECTROCARDIOGRAM COMPLETE: CPT | Mod: 59

## 2025-04-29 RX ORDER — SERTRALINE HYDROCHLORIDE 50 MG/1
50 TABLET, FILM COATED ORAL
Refills: 0 | Status: ACTIVE | COMMUNITY

## 2025-05-01 ENCOUNTER — RX RENEWAL (OUTPATIENT)
Age: 80
End: 2025-05-01

## 2025-05-02 ENCOUNTER — RX RENEWAL (OUTPATIENT)
Age: 80
End: 2025-05-02

## 2025-05-08 NOTE — ED PROVIDER NOTE - NSICDXFAMILYHX_GEN_ALL_CORE_FT
[facility] APPOINTMENT CONFIRMATION      Date: 05/15/2025    Time: 1 pm    Provider name: Dr. Tucker Post     Location: Neurology Locations: [place]: [address] [place] [address]    Zoom link sent (if applicable): N/A    Will patient be in [place] for the virtual visit? N/A    Is patient currently in a facility? No    Alternative contact information: none    Appointment confirmed: No and left phone message    \"Please ensure you bring the medication bottles, including the dates and times you take each medication.\"  New Patients: \"Please bring any outside records or images.\"    \"We do have free parking available in the main hospital parking lot. However, parking can be difficult to find so we ask that you arrive 40 minutes prior to your appointment.\"       Detail Level: Detailed Depth Of Biopsy: dermis Was A Bandage Applied: Yes Size Of Lesion In Cm: 0.5 X Size Of Lesion In Cm: 0 Biopsy Type: H and E Biopsy Method: Dermablade Anesthesia Type: 1% lidocaine with epinephrine Hemostasis: Aluminum Chloride Wound Care: Petrolatum Dressing: bandage Destruction After The Procedure: No Type Of Destruction Used: Curettage Curettage Text: The wound bed was treated with curettage after the biopsy was performed. Cryotherapy Text: The wound bed was treated with cryotherapy after the biopsy was performed. Electrodesiccation Text: The wound bed was treated with electrodesiccation after the biopsy was performed. Electrodesiccation And Curettage Text: The wound bed was treated with electrodesiccation and curettage after the biopsy was performed. FAMILY HISTORY:  Father  Still living? No  FHx: lung cancer, Age at diagnosis: Age Unknown    Mother  Still living? No  FH: heart disease, Age at diagnosis: Age Unknown     Silver Nitrate Text: The wound bed was treated with silver nitrate after the biopsy was performed. Lab: 6 Lab Facility: 3 Consent: Written consent was obtained and risks were reviewed including but not limited to scarring, infection, bleeding, scabbing, incomplete removal, nerve damage and allergy to anesthesia. Post-Care Instructions: I reviewed with the patient in detail post-care instructions. Patient is to keep the biopsy site dry overnight, and then apply Bacitracin, Vaseline or Polysporin once daily until healed. Notification Instructions: Patient will be notified of biopsy results within 2-3 weeks. Billing Type: Third-Party Bill Information: Selecting Yes will display possible errors in your note based on the variables you have selected. This validation is only offered as a suggestion for you. PLEASE NOTE THAT THE VALIDATION TEXT WILL BE REMOVED WHEN YOU FINALIZE YOUR NOTE. IF YOU WANT TO FAX A PRELIMINARY NOTE YOU WILL NEED TO TOGGLE THIS TO 'NO' IF YOU DO NOT WANT IT IN YOUR FAXED NOTE.

## 2025-05-19 DIAGNOSIS — R26.9 UNSPECIFIED ABNORMALITIES OF GAIT AND MOBILITY: ICD-10-CM

## 2025-05-27 ENCOUNTER — RX RENEWAL (OUTPATIENT)
Age: 80
End: 2025-05-27

## 2025-05-30 ENCOUNTER — APPOINTMENT (OUTPATIENT)
Dept: FAMILY MEDICINE | Facility: CLINIC | Age: 80
End: 2025-05-30
Payer: MEDICARE

## 2025-05-30 VITALS
WEIGHT: 245 LBS | BODY MASS INDEX: 37.13 KG/M2 | HEART RATE: 81 BPM | TEMPERATURE: 97.6 F | OXYGEN SATURATION: 94 % | SYSTOLIC BLOOD PRESSURE: 160 MMHG | DIASTOLIC BLOOD PRESSURE: 60 MMHG | HEIGHT: 68 IN

## 2025-05-30 DIAGNOSIS — N18.30 CHRONIC KIDNEY DISEASE, STAGE 3 UNSPECIFIED: ICD-10-CM

## 2025-05-30 DIAGNOSIS — E55.9 VITAMIN D DEFICIENCY, UNSPECIFIED: ICD-10-CM

## 2025-05-30 DIAGNOSIS — E66.812 OBESITY, CLASS 2: ICD-10-CM

## 2025-05-30 DIAGNOSIS — M10.9 GOUT, UNSPECIFIED: ICD-10-CM

## 2025-05-30 DIAGNOSIS — E78.2 MIXED HYPERLIPIDEMIA: ICD-10-CM

## 2025-05-30 DIAGNOSIS — E88.810 METABOLIC SYNDROME: ICD-10-CM

## 2025-05-30 DIAGNOSIS — I10 ESSENTIAL (PRIMARY) HYPERTENSION: ICD-10-CM

## 2025-05-30 DIAGNOSIS — F41.9 ANXIETY DISORDER, UNSPECIFIED: ICD-10-CM

## 2025-05-30 DIAGNOSIS — I47.20 VENTRICULAR TACHYCARDIA, UNSPECIFIED: ICD-10-CM

## 2025-05-30 DIAGNOSIS — E66.01 OBESITY, CLASS 2: ICD-10-CM

## 2025-05-30 PROCEDURE — 36415 COLL VENOUS BLD VENIPUNCTURE: CPT

## 2025-05-30 PROCEDURE — 99214 OFFICE O/P EST MOD 30 MIN: CPT

## 2025-05-30 PROCEDURE — G2211 COMPLEX E/M VISIT ADD ON: CPT

## 2025-05-31 LAB
ALBUMIN SERPL ELPH-MCNC: 4.5 G/DL
ALP BLD-CCNC: 93 U/L
ALT SERPL-CCNC: 32 U/L
ANION GAP SERPL CALC-SCNC: 14 MMOL/L
AST SERPL-CCNC: 22 U/L
BILIRUB SERPL-MCNC: 0.2 MG/DL
BUN SERPL-MCNC: 53 MG/DL
CALCIUM SERPL-MCNC: 9.6 MG/DL
CHLORIDE SERPL-SCNC: 110 MMOL/L
CHOLEST SERPL-MCNC: 204 MG/DL
CO2 SERPL-SCNC: 19 MMOL/L
CREAT SERPL-MCNC: 1.9 MG/DL
EGFRCR SERPLBLD CKD-EPI 2021: 35 ML/MIN/1.73M2
GLUCOSE SERPL-MCNC: 114 MG/DL
HDLC SERPL-MCNC: 43 MG/DL
LDLC SERPL-MCNC: 87 MG/DL
NONHDLC SERPL-MCNC: 161 MG/DL
POTASSIUM SERPL-SCNC: 5.3 MMOL/L
PROT SERPL-MCNC: 6.8 G/DL
SODIUM SERPL-SCNC: 143 MMOL/L
TRIGL SERPL-MCNC: 455 MG/DL

## 2025-06-02 ENCOUNTER — APPOINTMENT (OUTPATIENT)
Dept: ELECTROPHYSIOLOGY | Facility: CLINIC | Age: 80
End: 2025-06-02
Payer: MEDICARE

## 2025-06-02 VITALS
WEIGHT: 245 LBS | HEIGHT: 68 IN | OXYGEN SATURATION: 94 % | HEART RATE: 83 BPM | DIASTOLIC BLOOD PRESSURE: 64 MMHG | BODY MASS INDEX: 37.13 KG/M2 | SYSTOLIC BLOOD PRESSURE: 135 MMHG

## 2025-06-02 DIAGNOSIS — R00.1 BRADYCARDIA, UNSPECIFIED: ICD-10-CM

## 2025-06-02 DIAGNOSIS — R42 DIZZINESS AND GIDDINESS: ICD-10-CM

## 2025-06-02 PROCEDURE — 93000 ELECTROCARDIOGRAM COMPLETE: CPT

## 2025-06-02 PROCEDURE — 99214 OFFICE O/P EST MOD 30 MIN: CPT

## 2025-06-03 ENCOUNTER — NON-APPOINTMENT (OUTPATIENT)
Age: 80
End: 2025-06-03

## 2025-06-06 LAB — URATE SERPL-MCNC: 6.7 MG/DL

## 2025-06-12 ENCOUNTER — TRANSCRIPTION ENCOUNTER (OUTPATIENT)
Age: 80
End: 2025-06-12

## 2025-06-13 NOTE — ASU PATIENT PROFILE, ADULT - FALL HARM RISK - PT AGE POPULATION HIDDEN
Adult DATE:



SUBJECTIVE:  The patient is a 75-year-old female in room 129, bed 2.  She

is sitting in bed, eating breakfast.  She has no complaints and there have

been no acute events overnight.



PHYSICAL EXAMINATION:

VITAL SIGNS:  Temperature of 98.6, pulse rate of 72, blood pressure of

108/51, respiratory rate of 18 with an O2 saturation of 96% on nasal

cannula.

HEENT:  PERRLA.  EOMI.  There is no icterus.

LUNGS:  Clear to auscultation and percussion bilaterally.

HEART:  Irregular rate and rhythm with no murmurs, rubs, or gallops.

ABDOMEN:  Soft.  It is nontender.  There are no masses.

EXTREMITIES:  Show patient is status post surgery of right hip fracture.

NEUROLOGIC:  There are no focal motor deficits.



LABORATORY DATA:  Lab values at this time are CBC with a WBC of 11.3,

hemoglobin and hematocrit of 9.1 and 27.3.  Chemistry is essentially

normal, glucose is 116.  AST is 92, it was up as high as 116; ALT is 45

which is normal.



CURRENT DIAGNOSES:  Right hip fracture, skin tear, arm contusion, abnormal

EKG.



PLAN:  We will continue current treatment.  Place the patient in a rehab

facility.







__________________________________________

Stefano Edwards MD



DD:  03/16/2018 9:29:52

DT:  03/16/2018 10:16:13

Job # [id number]

## 2025-06-13 NOTE — ASU PATIENT PROFILE, ADULT - FALL HARM RISK - RISK INTERVENTIONS

## 2025-06-16 ENCOUNTER — OUTPATIENT (OUTPATIENT)
Dept: OUTPATIENT SERVICES | Facility: HOSPITAL | Age: 80
LOS: 1 days | Discharge: HOME | End: 2025-06-16
Payer: MEDICARE

## 2025-06-16 VITALS
DIASTOLIC BLOOD PRESSURE: 75 MMHG | RESPIRATION RATE: 18 BRPM | WEIGHT: 244.93 LBS | SYSTOLIC BLOOD PRESSURE: 177 MMHG | TEMPERATURE: 98 F | OXYGEN SATURATION: 98 % | HEIGHT: 68 IN | HEART RATE: 82 BPM

## 2025-06-16 VITALS
RESPIRATION RATE: 18 BRPM | SYSTOLIC BLOOD PRESSURE: 150 MMHG | OXYGEN SATURATION: 99 % | TEMPERATURE: 98 F | DIASTOLIC BLOOD PRESSURE: 68 MMHG | HEART RATE: 78 BPM

## 2025-06-16 DIAGNOSIS — Z98.890 OTHER SPECIFIED POSTPROCEDURAL STATES: Chronic | ICD-10-CM

## 2025-06-16 DIAGNOSIS — R00.1 BRADYCARDIA, UNSPECIFIED: ICD-10-CM

## 2025-06-16 DIAGNOSIS — I47.20 VENTRICULAR TACHYCARDIA, UNSPECIFIED: ICD-10-CM

## 2025-06-16 PROCEDURE — 33286 RMVL SUBQ CAR RHYTHM MNTR: CPT

## 2025-06-16 PROCEDURE — C1764: CPT

## 2025-06-16 PROCEDURE — 33285 INSJ SUBQ CAR RHYTHM MNTR: CPT

## 2025-06-16 PROCEDURE — 33286 RMVL SUBQ CAR RHYTHM MNTR: CPT | Mod: 59

## 2025-06-16 NOTE — PACU DISCHARGE NOTE - COMMENTS
Discharge instructions given to patient. Answered all questions, Verbalized understanding. All belongings are with the patient.

## 2025-06-18 NOTE — POST DISCHARGE NOTE - NOTIFICATION:
Post procedure phone call completed; patient understood all discharge paperwork. No questions regarding medications or pain management. MD follow up appointment made. Patient was able to rest when they were discharged. Patient will recommend [facility], no complaints of hospital stay, satisfied with care. Instructed patient to contact provider with any further questions or concerns.

## 2025-06-19 DIAGNOSIS — T82.111A BREAKDOWN (MECHANICAL) OF CARDIAC PULSE GENERATOR (BATTERY), INITIAL ENCOUNTER: ICD-10-CM
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
## 2025-06-20 ENCOUNTER — RX RENEWAL (OUTPATIENT)
Age: 80
End: 2025-06-20

## 2025-06-23 ENCOUNTER — APPOINTMENT (OUTPATIENT)
Dept: FAMILY MEDICINE | Facility: CLINIC | Age: 80
End: 2025-06-23
Payer: MEDICARE

## 2025-06-23 VITALS
OXYGEN SATURATION: 94 % | SYSTOLIC BLOOD PRESSURE: 150 MMHG | BODY MASS INDEX: 39.25 KG/M2 | DIASTOLIC BLOOD PRESSURE: 60 MMHG | WEIGHT: 259 LBS | TEMPERATURE: 98 F | HEIGHT: 68 IN | HEART RATE: 84 BPM

## 2025-06-23 PROCEDURE — G2211 COMPLEX E/M VISIT ADD ON: CPT

## 2025-06-23 PROCEDURE — 99214 OFFICE O/P EST MOD 30 MIN: CPT

## 2025-06-30 ENCOUNTER — APPOINTMENT (OUTPATIENT)
Dept: ELECTROPHYSIOLOGY | Facility: CLINIC | Age: 80
End: 2025-06-30

## 2025-06-30 VITALS
BODY MASS INDEX: 37.89 KG/M2 | HEIGHT: 68 IN | SYSTOLIC BLOOD PRESSURE: 121 MMHG | OXYGEN SATURATION: 97 % | HEART RATE: 77 BPM | DIASTOLIC BLOOD PRESSURE: 98 MMHG | WEIGHT: 250 LBS

## 2025-06-30 VITALS — SYSTOLIC BLOOD PRESSURE: 138 MMHG | DIASTOLIC BLOOD PRESSURE: 73 MMHG

## 2025-06-30 PROCEDURE — 93285 PRGRMG DEV EVAL SCRMS IP: CPT

## 2025-07-07 ENCOUNTER — APPOINTMENT (OUTPATIENT)
Dept: ELECTROPHYSIOLOGY | Facility: CLINIC | Age: 80
End: 2025-07-07

## 2025-07-14 ENCOUNTER — APPOINTMENT (OUTPATIENT)
Dept: FAMILY MEDICINE | Facility: CLINIC | Age: 80
End: 2025-07-14
Payer: MEDICARE

## 2025-07-14 ENCOUNTER — LABORATORY RESULT (OUTPATIENT)
Age: 80
End: 2025-07-14

## 2025-07-14 VITALS
WEIGHT: 262 LBS | DIASTOLIC BLOOD PRESSURE: 60 MMHG | BODY MASS INDEX: 39.71 KG/M2 | TEMPERATURE: 98 F | OXYGEN SATURATION: 94 % | SYSTOLIC BLOOD PRESSURE: 170 MMHG | HEART RATE: 85 BPM | HEIGHT: 68 IN

## 2025-07-14 PROCEDURE — G2211 COMPLEX E/M VISIT ADD ON: CPT

## 2025-07-14 PROCEDURE — 36415 COLL VENOUS BLD VENIPUNCTURE: CPT

## 2025-07-14 PROCEDURE — 99214 OFFICE O/P EST MOD 30 MIN: CPT

## 2025-07-15 LAB
CCP AB SER IA-ACNC: <8 U/ML
CCP AB SER QL: NEGATIVE
ERYTHROCYTE [SEDIMENTATION RATE] IN BLOOD BY WESTERGREN METHOD: 45 MM/HR
RHEUMATOID FACT SERPL-ACNC: <10 IU/ML
URATE SERPL-MCNC: 6.9 MG/DL

## 2025-07-20 LAB — ANA TITR SER: NEGATIVE

## 2025-07-31 ENCOUNTER — NON-APPOINTMENT (OUTPATIENT)
Age: 80
End: 2025-07-31

## 2025-07-31 ENCOUNTER — APPOINTMENT (OUTPATIENT)
Dept: ELECTROPHYSIOLOGY | Facility: CLINIC | Age: 80
End: 2025-07-31
Payer: MEDICARE

## 2025-07-31 PROCEDURE — 93298 REM INTERROG DEV EVAL SCRMS: CPT

## 2025-08-05 ENCOUNTER — APPOINTMENT (OUTPATIENT)
Dept: CARDIOLOGY | Facility: CLINIC | Age: 80
End: 2025-08-05
Payer: MEDICARE

## 2025-08-05 ENCOUNTER — APPOINTMENT (OUTPATIENT)
Dept: PULMONOLOGY | Facility: CLINIC | Age: 80
End: 2025-08-05

## 2025-08-05 ENCOUNTER — INPATIENT (INPATIENT)
Facility: HOSPITAL | Age: 80
LOS: 2 days | Discharge: HOME | DRG: 286 | End: 2025-08-08
Attending: STUDENT IN AN ORGANIZED HEALTH CARE EDUCATION/TRAINING PROGRAM | Admitting: STUDENT IN AN ORGANIZED HEALTH CARE EDUCATION/TRAINING PROGRAM
Payer: MEDICARE

## 2025-08-05 ENCOUNTER — TRANSCRIPTION ENCOUNTER (OUTPATIENT)
Age: 80
End: 2025-08-05

## 2025-08-05 VITALS
OXYGEN SATURATION: 93 % | SYSTOLIC BLOOD PRESSURE: 144 MMHG | HEART RATE: 87 BPM | DIASTOLIC BLOOD PRESSURE: 64 MMHG | HEIGHT: 68 IN | RESPIRATION RATE: 15 BRPM | BODY MASS INDEX: 40.16 KG/M2 | WEIGHT: 265 LBS | TEMPERATURE: 97.6 F

## 2025-08-05 VITALS
OXYGEN SATURATION: 97 % | HEART RATE: 77 BPM | HEIGHT: 68 IN | DIASTOLIC BLOOD PRESSURE: 62 MMHG | SYSTOLIC BLOOD PRESSURE: 134 MMHG | WEIGHT: 265 LBS | BODY MASS INDEX: 40.16 KG/M2

## 2025-08-05 VITALS — HEIGHT: 68 IN

## 2025-08-05 DIAGNOSIS — M10.30 GOUT DUE TO RENAL IMPAIRMENT, UNSPECIFIED SITE: ICD-10-CM

## 2025-08-05 DIAGNOSIS — I50.33 ACUTE ON CHRONIC DIASTOLIC (CONGESTIVE) HEART FAILURE: ICD-10-CM

## 2025-08-05 DIAGNOSIS — E66.01 MORBID (SEVERE) OBESITY DUE TO EXCESS CALORIES: ICD-10-CM

## 2025-08-05 DIAGNOSIS — E78.5 HYPERLIPIDEMIA, UNSPECIFIED: ICD-10-CM

## 2025-08-05 DIAGNOSIS — N17.9 ACUTE KIDNEY FAILURE, UNSPECIFIED: ICD-10-CM

## 2025-08-05 DIAGNOSIS — I50.9 HEART FAILURE, UNSPECIFIED: ICD-10-CM

## 2025-08-05 DIAGNOSIS — Z98.890 OTHER SPECIFIED POSTPROCEDURAL STATES: Chronic | ICD-10-CM

## 2025-08-05 DIAGNOSIS — F32.A DEPRESSION, UNSPECIFIED: ICD-10-CM

## 2025-08-05 DIAGNOSIS — G47.33 OBSTRUCTIVE SLEEP APNEA (ADULT) (PEDIATRIC): ICD-10-CM

## 2025-08-05 DIAGNOSIS — D64.9 ANEMIA, UNSPECIFIED: ICD-10-CM

## 2025-08-05 DIAGNOSIS — F41.9 ANXIETY DISORDER, UNSPECIFIED: ICD-10-CM

## 2025-08-05 DIAGNOSIS — Z79.52 LONG TERM (CURRENT) USE OF SYSTEMIC STEROIDS: ICD-10-CM

## 2025-08-05 DIAGNOSIS — Z45.09 ENCOUNTER FOR ADJUSTMENT AND MANAGEMENT OF OTHER CARDIAC DEVICE: ICD-10-CM

## 2025-08-05 DIAGNOSIS — Z85.51 PERSONAL HISTORY OF MALIGNANT NEOPLASM OF BLADDER: ICD-10-CM

## 2025-08-05 DIAGNOSIS — Z79.82 LONG TERM (CURRENT) USE OF ASPIRIN: ICD-10-CM

## 2025-08-05 DIAGNOSIS — N18.9 CHRONIC KIDNEY DISEASE, UNSPECIFIED: ICD-10-CM

## 2025-08-05 DIAGNOSIS — J98.4 OTHER DISORDERS OF LUNG: ICD-10-CM

## 2025-08-05 DIAGNOSIS — N40.0 BENIGN PROSTATIC HYPERPLASIA WITHOUT LOWER URINARY TRACT SYMPTOMS: ICD-10-CM

## 2025-08-05 DIAGNOSIS — Z79.899 OTHER LONG TERM (CURRENT) DRUG THERAPY: ICD-10-CM

## 2025-08-05 DIAGNOSIS — I34.0 NONRHEUMATIC MITRAL (VALVE) INSUFFICIENCY: ICD-10-CM

## 2025-08-05 DIAGNOSIS — Z91.199 PATIENT'S NONCOMPLIANCE WITH OTHER MEDICAL TREATMENT AND REGIMEN DUE TO UNSPECIFIED REASON: ICD-10-CM

## 2025-08-05 DIAGNOSIS — I25.10 ATHEROSCLEROTIC HEART DISEASE OF NATIVE CORONARY ARTERY WITHOUT ANGINA PECTORIS: ICD-10-CM

## 2025-08-05 LAB
ADD ON TEST-SPECIMEN IN LAB: SIGNIFICANT CHANGE UP
ALBUMIN SERPL ELPH-MCNC: 3.2 G/DL — LOW (ref 3.3–5)
ALP SERPL-CCNC: 87 U/L — SIGNIFICANT CHANGE UP (ref 40–120)
ALT FLD-CCNC: 32 U/L — SIGNIFICANT CHANGE UP (ref 12–78)
ANION GAP SERPL CALC-SCNC: 5 MMOL/L — SIGNIFICANT CHANGE UP (ref 5–17)
APPEARANCE UR: CLEAR — SIGNIFICANT CHANGE UP
APTT BLD: 28.4 SEC — SIGNIFICANT CHANGE UP (ref 26.1–36.8)
AST SERPL-CCNC: 22 U/L — SIGNIFICANT CHANGE UP (ref 15–37)
BACTERIA # UR AUTO: NEGATIVE /HPF — SIGNIFICANT CHANGE UP
BASOPHILS # BLD AUTO: 0.04 K/UL — SIGNIFICANT CHANGE UP (ref 0–0.2)
BASOPHILS NFR BLD AUTO: 0.6 % — SIGNIFICANT CHANGE UP (ref 0–2)
BILIRUB SERPL-MCNC: 0.3 MG/DL — SIGNIFICANT CHANGE UP (ref 0.2–1.2)
BILIRUB UR-MCNC: NEGATIVE — SIGNIFICANT CHANGE UP
BLD GP AB SCN SERPL QL: SIGNIFICANT CHANGE UP
BUN SERPL-MCNC: 57 MG/DL — HIGH (ref 7–23)
CALCIUM SERPL-MCNC: 9 MG/DL — SIGNIFICANT CHANGE UP (ref 8.5–10.1)
CAST: 1 /LPF — SIGNIFICANT CHANGE UP (ref 0–4)
CHLORIDE SERPL-SCNC: 115 MMOL/L — HIGH (ref 96–108)
CO2 SERPL-SCNC: 24 MMOL/L — SIGNIFICANT CHANGE UP (ref 22–31)
COLOR SPEC: YELLOW — SIGNIFICANT CHANGE UP
CREAT SERPL-MCNC: 2.25 MG/DL — HIGH (ref 0.5–1.3)
D DIMER BLD IA.RAPID-MCNC: 288 NG/ML DDU — HIGH
DIFF PNL FLD: NEGATIVE — SIGNIFICANT CHANGE UP
EGFR: 29 ML/MIN/1.73M2 — LOW
EGFR: 29 ML/MIN/1.73M2 — LOW
EOSINOPHIL # BLD AUTO: 0.16 K/UL — SIGNIFICANT CHANGE UP (ref 0–0.5)
EOSINOPHIL NFR BLD AUTO: 2.3 % — SIGNIFICANT CHANGE UP (ref 0–6)
FLUAV AG NPH QL: SIGNIFICANT CHANGE UP
FLUBV AG NPH QL: SIGNIFICANT CHANGE UP
GLUCOSE SERPL-MCNC: 135 MG/DL — HIGH (ref 70–99)
GLUCOSE UR QL: NEGATIVE MG/DL — SIGNIFICANT CHANGE UP
HCT VFR BLD CALC: 31.9 % — LOW (ref 39–50)
HGB BLD-MCNC: 10.3 G/DL — LOW (ref 13–17)
IMM GRANULOCYTES # BLD AUTO: 0.02 K/UL — SIGNIFICANT CHANGE UP (ref 0–0.07)
IMM GRANULOCYTES NFR BLD AUTO: 0.3 % — SIGNIFICANT CHANGE UP (ref 0–0.9)
INR BLD: 0.96 RATIO — SIGNIFICANT CHANGE UP (ref 0.85–1.16)
KETONES UR QL: NEGATIVE MG/DL — SIGNIFICANT CHANGE UP
LEUKOCYTE ESTERASE UR-ACNC: NEGATIVE — SIGNIFICANT CHANGE UP
LYMPHOCYTES # BLD AUTO: 1.4 K/UL — SIGNIFICANT CHANGE UP (ref 1–3.3)
LYMPHOCYTES NFR BLD AUTO: 20.4 % — SIGNIFICANT CHANGE UP (ref 13–44)
MAGNESIUM SERPL-MCNC: 2 MG/DL — SIGNIFICANT CHANGE UP (ref 1.6–2.6)
MCHC RBC-ENTMCNC: 30.7 PG — SIGNIFICANT CHANGE UP (ref 27–34)
MCHC RBC-ENTMCNC: 32.3 G/DL — SIGNIFICANT CHANGE UP (ref 32–36)
MCV RBC AUTO: 95.2 FL — SIGNIFICANT CHANGE UP (ref 80–100)
MONOCYTES # BLD AUTO: 0.68 K/UL — SIGNIFICANT CHANGE UP (ref 0–0.9)
MONOCYTES NFR BLD AUTO: 9.9 % — SIGNIFICANT CHANGE UP (ref 2–14)
NEUTROPHILS # BLD AUTO: 4.56 K/UL — SIGNIFICANT CHANGE UP (ref 1.8–7.4)
NEUTROPHILS NFR BLD AUTO: 66.5 % — SIGNIFICANT CHANGE UP (ref 43–77)
NITRITE UR-MCNC: NEGATIVE — SIGNIFICANT CHANGE UP
NRBC # BLD AUTO: 0 K/UL — SIGNIFICANT CHANGE UP (ref 0–0)
NRBC # FLD: 0 K/UL — SIGNIFICANT CHANGE UP (ref 0–0)
NRBC BLD AUTO-RTO: 0 /100 WBCS — SIGNIFICANT CHANGE UP (ref 0–0)
NT-PROBNP SERPL-SCNC: 552 PG/ML — HIGH (ref 0–450)
PH UR: 5 — SIGNIFICANT CHANGE UP (ref 5–8)
PLATELET # BLD AUTO: 200 K/UL — SIGNIFICANT CHANGE UP (ref 150–400)
PMV BLD: 9.5 FL — SIGNIFICANT CHANGE UP (ref 7–13)
POTASSIUM SERPL-MCNC: 4.7 MMOL/L — SIGNIFICANT CHANGE UP (ref 3.5–5.3)
POTASSIUM SERPL-SCNC: 4.7 MMOL/L — SIGNIFICANT CHANGE UP (ref 3.5–5.3)
PROT SERPL-MCNC: 6.6 GM/DL — SIGNIFICANT CHANGE UP (ref 6–8.3)
PROT UR-MCNC: 100 MG/DL
PROTHROM AB SERPL-ACNC: 11 SEC — SIGNIFICANT CHANGE UP (ref 9.9–13.4)
RBC # BLD: 3.35 M/UL — LOW (ref 4.2–5.8)
RBC # FLD: 13.4 % — SIGNIFICANT CHANGE UP (ref 10.3–14.5)
RBC CASTS # UR COMP ASSIST: 0 /HPF — SIGNIFICANT CHANGE UP (ref 0–4)
RSV RNA NPH QL NAA+NON-PROBE: SIGNIFICANT CHANGE UP
SARS-COV-2 RNA SPEC QL NAA+PROBE: SIGNIFICANT CHANGE UP
SODIUM SERPL-SCNC: 144 MMOL/L — SIGNIFICANT CHANGE UP (ref 135–145)
SOURCE RESPIRATORY: SIGNIFICANT CHANGE UP
SP GR SPEC: 1.01 — SIGNIFICANT CHANGE UP (ref 1–1.03)
SQUAMOUS # UR AUTO: 0 /HPF — SIGNIFICANT CHANGE UP (ref 0–5)
TROPONIN I, HIGH SENSITIVITY RESULT: 15.69 NG/L — SIGNIFICANT CHANGE UP
TROPONIN I, HIGH SENSITIVITY RESULT: 18.67 NG/L — SIGNIFICANT CHANGE UP
UROBILINOGEN FLD QL: 0.2 MG/DL — SIGNIFICANT CHANGE UP (ref 0.2–1)
WBC # BLD: 6.86 K/UL — SIGNIFICANT CHANGE UP (ref 3.8–10.5)
WBC # FLD AUTO: 6.86 K/UL — SIGNIFICANT CHANGE UP (ref 3.8–10.5)
WBC UR QL: 0 /HPF — SIGNIFICANT CHANGE UP (ref 0–5)

## 2025-08-05 PROCEDURE — 82436 ASSAY OF URINE CHLORIDE: CPT

## 2025-08-05 PROCEDURE — 71045 X-RAY EXAM CHEST 1 VIEW: CPT | Mod: 26

## 2025-08-05 PROCEDURE — 84100 ASSAY OF PHOSPHORUS: CPT

## 2025-08-05 PROCEDURE — 84540 ASSAY OF URINE/UREA-N: CPT

## 2025-08-05 PROCEDURE — 84484 ASSAY OF TROPONIN QUANT: CPT

## 2025-08-05 PROCEDURE — 80061 LIPID PANEL: CPT

## 2025-08-05 PROCEDURE — 82570 ASSAY OF URINE CREATININE: CPT

## 2025-08-05 PROCEDURE — C1769: CPT

## 2025-08-05 PROCEDURE — 93970 EXTREMITY STUDY: CPT | Mod: 26

## 2025-08-05 PROCEDURE — 99214 OFFICE O/P EST MOD 30 MIN: CPT

## 2025-08-05 PROCEDURE — 82803 BLOOD GASES ANY COMBINATION: CPT

## 2025-08-05 PROCEDURE — 80053 COMPREHEN METABOLIC PANEL: CPT

## 2025-08-05 PROCEDURE — 99285 EMERGENCY DEPT VISIT HI MDM: CPT

## 2025-08-05 PROCEDURE — 93010 ELECTROCARDIOGRAM REPORT: CPT

## 2025-08-05 PROCEDURE — 93306 TTE W/DOPPLER COMPLETE: CPT

## 2025-08-05 PROCEDURE — 93460 R&L HRT ART/VENTRICLE ANGIO: CPT

## 2025-08-05 PROCEDURE — 84300 ASSAY OF URINE SODIUM: CPT

## 2025-08-05 PROCEDURE — 93000 ELECTROCARDIOGRAM COMPLETE: CPT

## 2025-08-05 PROCEDURE — 85027 COMPLETE CBC AUTOMATED: CPT

## 2025-08-05 PROCEDURE — 84156 ASSAY OF PROTEIN URINE: CPT

## 2025-08-05 PROCEDURE — C1894: CPT

## 2025-08-05 PROCEDURE — 83036 HEMOGLOBIN GLYCOSYLATED A1C: CPT

## 2025-08-05 PROCEDURE — 80048 BASIC METABOLIC PNL TOTAL CA: CPT

## 2025-08-05 PROCEDURE — 87637 SARSCOV2&INF A&B&RSV AMP PRB: CPT

## 2025-08-05 PROCEDURE — 81001 URINALYSIS AUTO W/SCOPE: CPT

## 2025-08-05 PROCEDURE — G2211 COMPLEX E/M VISIT ADD ON: CPT

## 2025-08-05 PROCEDURE — 83735 ASSAY OF MAGNESIUM: CPT

## 2025-08-05 PROCEDURE — 94660 CPAP INITIATION&MGMT: CPT

## 2025-08-05 PROCEDURE — G0447 BEHAVIOR COUNSEL OBESITY 15M: CPT | Mod: 59

## 2025-08-05 PROCEDURE — 84443 ASSAY THYROID STIM HORMONE: CPT

## 2025-08-05 PROCEDURE — C1887: CPT

## 2025-08-05 PROCEDURE — 36415 COLL VENOUS BLD VENIPUNCTURE: CPT

## 2025-08-05 PROCEDURE — 84133 ASSAY OF URINE POTASSIUM: CPT

## 2025-08-05 RX ORDER — SAW/PYGEUM/BETA/HERB/D3/B6/ZN 30 MG-25MG
CAPSULE ORAL
Refills: 0 | Status: ACTIVE | COMMUNITY

## 2025-08-05 RX ORDER — OMEGA-3-ACID ETHYL ESTERS CAPSULES 1 G/1
1 CAPSULE, LIQUID FILLED ORAL
Refills: 0 | DISCHARGE

## 2025-08-05 RX ORDER — PSYLLIUM HUSK 0.4 G
CAPSULE ORAL
Refills: 0 | Status: ACTIVE | COMMUNITY

## 2025-08-05 RX ORDER — B1/B2/B3/B5/B6/B12/VIT C/FOLIC 500-0.5 MG
1 TABLET ORAL
Refills: 0 | DISCHARGE

## 2025-08-05 RX ORDER — ASPIRIN 325 MG
1 TABLET ORAL
Refills: 0 | DISCHARGE

## 2025-08-05 RX ORDER — SODIUM BICARBONATE 1 MEQ/ML
2 SYRINGE (ML) INTRAVENOUS
Refills: 0 | DISCHARGE

## 2025-08-05 RX ORDER — PREDNISONE 20 MG/1
0 TABLET ORAL
Refills: 0 | DISCHARGE
Start: 2025-08-05

## 2025-08-06 ENCOUNTER — RESULT REVIEW (OUTPATIENT)
Age: 80
End: 2025-08-06

## 2025-08-06 DIAGNOSIS — G47.33 OBSTRUCTIVE SLEEP APNEA (ADULT) (PEDIATRIC): ICD-10-CM

## 2025-08-06 DIAGNOSIS — R06.09 OTHER FORMS OF DYSPNEA: ICD-10-CM

## 2025-08-06 DIAGNOSIS — N40.0 BENIGN PROSTATIC HYPERPLASIA WITHOUT LOWER URINARY TRACT SYMPTOMS: ICD-10-CM

## 2025-08-06 DIAGNOSIS — E78.5 HYPERLIPIDEMIA, UNSPECIFIED: ICD-10-CM

## 2025-08-06 DIAGNOSIS — Z29.9 ENCOUNTER FOR PROPHYLACTIC MEASURES, UNSPECIFIED: ICD-10-CM

## 2025-08-06 DIAGNOSIS — I50.9 HEART FAILURE, UNSPECIFIED: ICD-10-CM

## 2025-08-06 DIAGNOSIS — N17.9 ACUTE KIDNEY FAILURE, UNSPECIFIED: ICD-10-CM

## 2025-08-06 DIAGNOSIS — I25.10 ATHEROSCLEROTIC HEART DISEASE OF NATIVE CORONARY ARTERY WITHOUT ANGINA PECTORIS: ICD-10-CM

## 2025-08-06 DIAGNOSIS — I10 ESSENTIAL (PRIMARY) HYPERTENSION: ICD-10-CM

## 2025-08-06 LAB
A1C WITH ESTIMATED AVERAGE GLUCOSE RESULT: 5.7 % — HIGH (ref 4–5.6)
ALBUMIN SERPL ELPH-MCNC: 3.3 G/DL — SIGNIFICANT CHANGE UP (ref 3.3–5)
ALP SERPL-CCNC: 88 U/L — SIGNIFICANT CHANGE UP (ref 40–120)
ALT FLD-CCNC: 31 U/L — SIGNIFICANT CHANGE UP (ref 12–78)
ANION GAP SERPL CALC-SCNC: 5 MMOL/L — SIGNIFICANT CHANGE UP (ref 5–17)
AST SERPL-CCNC: 20 U/L — SIGNIFICANT CHANGE UP (ref 15–37)
BILIRUB SERPL-MCNC: 0.3 MG/DL — SIGNIFICANT CHANGE UP (ref 0.2–1.2)
BUN SERPL-MCNC: 54 MG/DL — HIGH (ref 7–23)
CALCIUM SERPL-MCNC: 8.9 MG/DL — SIGNIFICANT CHANGE UP (ref 8.5–10.1)
CHLORIDE SERPL-SCNC: 114 MMOL/L — HIGH (ref 96–108)
CHLORIDE UR-SCNC: 135 MMOL/L — SIGNIFICANT CHANGE UP
CHOLEST SERPL-MCNC: 217 MG/DL — HIGH
CO2 SERPL-SCNC: 22 MMOL/L — SIGNIFICANT CHANGE UP (ref 22–31)
CREAT ?TM UR-MCNC: 16 MG/DL — SIGNIFICANT CHANGE UP
CREAT SERPL-MCNC: 1.8 MG/DL — HIGH (ref 0.5–1.3)
EGFR: 38 ML/MIN/1.73M2 — LOW
EGFR: 38 ML/MIN/1.73M2 — LOW
ESTIMATED AVERAGE GLUCOSE: 117 MG/DL — HIGH (ref 68–114)
GLUCOSE SERPL-MCNC: 102 MG/DL — HIGH (ref 70–99)
HCT VFR BLD CALC: 33 % — LOW (ref 39–50)
HDLC SERPL-MCNC: 42 MG/DL — SIGNIFICANT CHANGE UP
HGB BLD-MCNC: 10.5 G/DL — LOW (ref 13–17)
LDLC SERPL-MCNC: 122 MG/DL — HIGH
LIPID PNL WITH DIRECT LDL SERPL: 122 MG/DL — HIGH
MAGNESIUM SERPL-MCNC: 2.1 MG/DL — SIGNIFICANT CHANGE UP (ref 1.6–2.6)
MCHC RBC-ENTMCNC: 29.9 PG — SIGNIFICANT CHANGE UP (ref 27–34)
MCHC RBC-ENTMCNC: 31.8 G/DL — LOW (ref 32–36)
MCV RBC AUTO: 94 FL — SIGNIFICANT CHANGE UP (ref 80–100)
NONHDLC SERPL-MCNC: 175 MG/DL — HIGH
NRBC # BLD AUTO: 0 K/UL — SIGNIFICANT CHANGE UP (ref 0–0)
NRBC # FLD: 0 K/UL — SIGNIFICANT CHANGE UP (ref 0–0)
NRBC BLD AUTO-RTO: 0 /100 WBCS — SIGNIFICANT CHANGE UP (ref 0–0)
PHOSPHATE SERPL-MCNC: 4.4 MG/DL — SIGNIFICANT CHANGE UP (ref 2.5–4.5)
PLATELET # BLD AUTO: 200 K/UL — SIGNIFICANT CHANGE UP (ref 150–400)
PMV BLD: 9.4 FL — SIGNIFICANT CHANGE UP (ref 7–13)
POTASSIUM SERPL-MCNC: 4.7 MMOL/L — SIGNIFICANT CHANGE UP (ref 3.5–5.3)
POTASSIUM SERPL-SCNC: 4.7 MMOL/L — SIGNIFICANT CHANGE UP (ref 3.5–5.3)
POTASSIUM UR-SCNC: 10 MMOL/L — SIGNIFICANT CHANGE UP
PROT ?TM UR-MCNC: 24 MG/DL — HIGH (ref 0–12)
PROT SERPL-MCNC: 6.8 GM/DL — SIGNIFICANT CHANGE UP (ref 6–8.3)
PROT/CREAT UR-RTO: 1.5 RATIO — HIGH (ref 0–0.2)
RBC # BLD: 3.51 M/UL — LOW (ref 4.2–5.8)
RBC # FLD: 13.4 % — SIGNIFICANT CHANGE UP (ref 10.3–14.5)
SODIUM SERPL-SCNC: 141 MMOL/L — SIGNIFICANT CHANGE UP (ref 135–145)
SODIUM UR-SCNC: 138 MMOL/L — SIGNIFICANT CHANGE UP
TRIGL SERPL-MCNC: 299 MG/DL — HIGH
TSH SERPL-MCNC: 4.47 UU/ML — SIGNIFICANT CHANGE UP (ref 0.34–4.82)
UUN UR-MCNC: 219 MG/DL — SIGNIFICANT CHANGE UP
WBC # BLD: 7.21 K/UL — SIGNIFICANT CHANGE UP (ref 3.8–10.5)
WBC # FLD AUTO: 7.21 K/UL — SIGNIFICANT CHANGE UP (ref 3.8–10.5)

## 2025-08-06 PROCEDURE — 99223 1ST HOSP IP/OBS HIGH 75: CPT

## 2025-08-06 PROCEDURE — 93306 TTE W/DOPPLER COMPLETE: CPT | Mod: 26

## 2025-08-06 PROCEDURE — 99223 1ST HOSP IP/OBS HIGH 75: CPT | Mod: FS

## 2025-08-06 RX ORDER — ONDANSETRON HCL/PF 4 MG/2 ML
4 VIAL (ML) INJECTION EVERY 8 HOURS
Refills: 0 | Status: DISCONTINUED | OUTPATIENT
Start: 2025-08-06 | End: 2025-08-08

## 2025-08-06 RX ORDER — ACETAMINOPHEN 500 MG/5ML
650 LIQUID (ML) ORAL EVERY 6 HOURS
Refills: 0 | Status: DISCONTINUED | OUTPATIENT
Start: 2025-08-06 | End: 2025-08-08

## 2025-08-06 RX ORDER — EZETIMIBE 10 MG/1
10 TABLET ORAL DAILY
Refills: 0 | Status: DISCONTINUED | OUTPATIENT
Start: 2025-08-06 | End: 2025-08-08

## 2025-08-06 RX ORDER — ASPIRIN 325 MG
81 TABLET ORAL DAILY
Refills: 0 | Status: DISCONTINUED | OUTPATIENT
Start: 2025-08-06 | End: 2025-08-08

## 2025-08-06 RX ORDER — ATORVASTATIN CALCIUM 80 MG/1
20 TABLET, FILM COATED ORAL AT BEDTIME
Refills: 0 | Status: DISCONTINUED | OUTPATIENT
Start: 2025-08-06 | End: 2025-08-06

## 2025-08-06 RX ORDER — TAMSULOSIN HYDROCHLORIDE 0.4 MG/1
0.4 CAPSULE ORAL AT BEDTIME
Refills: 0 | Status: DISCONTINUED | OUTPATIENT
Start: 2025-08-06 | End: 2025-08-08

## 2025-08-06 RX ORDER — ATORVASTATIN CALCIUM 80 MG/1
40 TABLET, FILM COATED ORAL AT BEDTIME
Refills: 0 | Status: DISCONTINUED | OUTPATIENT
Start: 2025-08-06 | End: 2025-08-08

## 2025-08-06 RX ORDER — MAGNESIUM, ALUMINUM HYDROXIDE 200-200 MG
30 TABLET,CHEWABLE ORAL EVERY 4 HOURS
Refills: 0 | Status: DISCONTINUED | OUTPATIENT
Start: 2025-08-06 | End: 2025-08-08

## 2025-08-06 RX ORDER — SODIUM BICARBONATE 1 MEQ/ML
1300 SYRINGE (ML) INTRAVENOUS
Refills: 0 | Status: DISCONTINUED | OUTPATIENT
Start: 2025-08-06 | End: 2025-08-08

## 2025-08-06 RX ORDER — FUROSEMIDE 10 MG/ML
20 INJECTION INTRAMUSCULAR; INTRAVENOUS
Refills: 0 | Status: DISCONTINUED | OUTPATIENT
Start: 2025-08-06 | End: 2025-08-07

## 2025-08-06 RX ORDER — SODIUM CHLORIDE 0.65 %
1 AEROSOL, SPRAY (ML) NASAL EVERY 4 HOURS
Refills: 0 | Status: DISCONTINUED | OUTPATIENT
Start: 2025-08-06 | End: 2025-08-08

## 2025-08-06 RX ORDER — AMLODIPINE BESYLATE 10 MG/1
10 TABLET ORAL DAILY
Refills: 0 | Status: DISCONTINUED | OUTPATIENT
Start: 2025-08-06 | End: 2025-08-08

## 2025-08-06 RX ORDER — METOPROLOL SUCCINATE 50 MG/1
25 TABLET, EXTENDED RELEASE ORAL DAILY
Refills: 0 | Status: DISCONTINUED | OUTPATIENT
Start: 2025-08-06 | End: 2025-08-07

## 2025-08-06 RX ORDER — MELATONIN 5 MG
3 TABLET ORAL AT BEDTIME
Refills: 0 | Status: DISCONTINUED | OUTPATIENT
Start: 2025-08-06 | End: 2025-08-08

## 2025-08-06 RX ORDER — HEPARIN SODIUM 1000 [USP'U]/ML
5000 INJECTION INTRAVENOUS; SUBCUTANEOUS EVERY 8 HOURS
Refills: 0 | Status: DISCONTINUED | OUTPATIENT
Start: 2025-08-06 | End: 2025-08-08

## 2025-08-06 RX ADMIN — EZETIMIBE 10 MILLIGRAM(S): 10 TABLET ORAL at 09:37

## 2025-08-06 RX ADMIN — HEPARIN SODIUM 5000 UNIT(S): 1000 INJECTION INTRAVENOUS; SUBCUTANEOUS at 05:57

## 2025-08-06 RX ADMIN — TAMSULOSIN HYDROCHLORIDE 0.4 MILLIGRAM(S): 0.4 CAPSULE ORAL at 21:33

## 2025-08-06 RX ADMIN — Medication 1300 MILLIGRAM(S): at 09:37

## 2025-08-06 RX ADMIN — METOPROLOL SUCCINATE 25 MILLIGRAM(S): 50 TABLET, EXTENDED RELEASE ORAL at 09:37

## 2025-08-06 RX ADMIN — Medication 100 MILLIGRAM(S): at 09:37

## 2025-08-06 RX ADMIN — ATORVASTATIN CALCIUM 40 MILLIGRAM(S): 80 TABLET, FILM COATED ORAL at 21:33

## 2025-08-06 RX ADMIN — HEPARIN SODIUM 5000 UNIT(S): 1000 INJECTION INTRAVENOUS; SUBCUTANEOUS at 13:09

## 2025-08-06 RX ADMIN — Medication 1300 MILLIGRAM(S): at 21:33

## 2025-08-06 RX ADMIN — Medication 81 MILLIGRAM(S): at 09:37

## 2025-08-06 RX ADMIN — FUROSEMIDE 20 MILLIGRAM(S): 10 INJECTION INTRAMUSCULAR; INTRAVENOUS at 13:09

## 2025-08-06 RX ADMIN — FUROSEMIDE 20 MILLIGRAM(S): 10 INJECTION INTRAMUSCULAR; INTRAVENOUS at 05:57

## 2025-08-06 RX ADMIN — AMLODIPINE BESYLATE 10 MILLIGRAM(S): 10 TABLET ORAL at 09:37

## 2025-08-07 LAB
ANION GAP SERPL CALC-SCNC: 8 MMOL/L — SIGNIFICANT CHANGE UP (ref 5–17)
BUN SERPL-MCNC: 56 MG/DL — HIGH (ref 7–23)
CALCIUM SERPL-MCNC: 9.5 MG/DL — SIGNIFICANT CHANGE UP (ref 8.5–10.1)
CHLORIDE SERPL-SCNC: 111 MMOL/L — HIGH (ref 96–108)
CO2 SERPL-SCNC: 23 MMOL/L — SIGNIFICANT CHANGE UP (ref 22–31)
CREAT SERPL-MCNC: 1.91 MG/DL — HIGH (ref 0.5–1.3)
EGFR: 35 ML/MIN/1.73M2 — LOW
EGFR: 35 ML/MIN/1.73M2 — LOW
GLUCOSE SERPL-MCNC: 112 MG/DL — HIGH (ref 70–99)
HCT VFR BLD CALC: 36.9 % — LOW (ref 39–50)
HGB BLD-MCNC: 11.5 G/DL — LOW (ref 13–17)
MCHC RBC-ENTMCNC: 29.7 PG — SIGNIFICANT CHANGE UP (ref 27–34)
MCHC RBC-ENTMCNC: 31.2 G/DL — LOW (ref 32–36)
MCV RBC AUTO: 95.3 FL — SIGNIFICANT CHANGE UP (ref 80–100)
NRBC # BLD AUTO: 0 K/UL — SIGNIFICANT CHANGE UP (ref 0–0)
NRBC # FLD: 0 K/UL — SIGNIFICANT CHANGE UP (ref 0–0)
NRBC BLD AUTO-RTO: 0 /100 WBCS — SIGNIFICANT CHANGE UP (ref 0–0)
PLATELET # BLD AUTO: 224 K/UL — SIGNIFICANT CHANGE UP (ref 150–400)
PMV BLD: 9.6 FL — SIGNIFICANT CHANGE UP (ref 7–13)
POTASSIUM SERPL-MCNC: 4.7 MMOL/L — SIGNIFICANT CHANGE UP (ref 3.5–5.3)
POTASSIUM SERPL-SCNC: 4.7 MMOL/L — SIGNIFICANT CHANGE UP (ref 3.5–5.3)
RBC # BLD: 3.87 M/UL — LOW (ref 4.2–5.8)
RBC # FLD: 13.3 % — SIGNIFICANT CHANGE UP (ref 10.3–14.5)
SAO2 % BLD: 53 % — LOW (ref 60–90)
SAO2 % BLDA: 97.8 % — SIGNIFICANT CHANGE UP (ref 94–98)
SODIUM SERPL-SCNC: 142 MMOL/L — SIGNIFICANT CHANGE UP (ref 135–145)
WBC # BLD: 7.53 K/UL — SIGNIFICANT CHANGE UP (ref 3.8–10.5)
WBC # FLD AUTO: 7.53 K/UL — SIGNIFICANT CHANGE UP (ref 3.8–10.5)

## 2025-08-07 PROCEDURE — 99233 SBSQ HOSP IP/OBS HIGH 50: CPT | Mod: FS

## 2025-08-07 PROCEDURE — 93285 PRGRMG DEV EVAL SCRMS IP: CPT | Mod: 26

## 2025-08-07 PROCEDURE — 93460 R&L HRT ART/VENTRICLE ANGIO: CPT | Mod: 26

## 2025-08-07 PROCEDURE — 99152 MOD SED SAME PHYS/QHP 5/>YRS: CPT

## 2025-08-07 PROCEDURE — 99222 1ST HOSP IP/OBS MODERATE 55: CPT | Mod: 25

## 2025-08-07 PROCEDURE — 99233 SBSQ HOSP IP/OBS HIGH 50: CPT

## 2025-08-07 RX ADMIN — TAMSULOSIN HYDROCHLORIDE 0.4 MILLIGRAM(S): 0.4 CAPSULE ORAL at 21:28

## 2025-08-07 RX ADMIN — Medication 1300 MILLIGRAM(S): at 09:39

## 2025-08-07 RX ADMIN — Medication 100 MILLIGRAM(S): at 09:38

## 2025-08-07 RX ADMIN — EZETIMIBE 10 MILLIGRAM(S): 10 TABLET ORAL at 09:39

## 2025-08-07 RX ADMIN — Medication 81 MILLIGRAM(S): at 09:39

## 2025-08-07 RX ADMIN — HEPARIN SODIUM 5000 UNIT(S): 1000 INJECTION INTRAVENOUS; SUBCUTANEOUS at 21:27

## 2025-08-07 RX ADMIN — ATORVASTATIN CALCIUM 40 MILLIGRAM(S): 80 TABLET, FILM COATED ORAL at 21:28

## 2025-08-07 RX ADMIN — Medication 50 MILLILITER(S): at 12:31

## 2025-08-07 RX ADMIN — Medication 3 MILLIGRAM(S): at 21:27

## 2025-08-07 RX ADMIN — FUROSEMIDE 20 MILLIGRAM(S): 10 INJECTION INTRAMUSCULAR; INTRAVENOUS at 06:03

## 2025-08-07 RX ADMIN — AMLODIPINE BESYLATE 10 MILLIGRAM(S): 10 TABLET ORAL at 09:40

## 2025-08-07 RX ADMIN — Medication 1300 MILLIGRAM(S): at 21:27

## 2025-08-07 RX ADMIN — METOPROLOL SUCCINATE 25 MILLIGRAM(S): 50 TABLET, EXTENDED RELEASE ORAL at 09:39

## 2025-08-08 ENCOUNTER — TRANSCRIPTION ENCOUNTER (OUTPATIENT)
Age: 80
End: 2025-08-08

## 2025-08-08 ENCOUNTER — APPOINTMENT (OUTPATIENT)
Dept: CARDIOLOGY | Facility: CLINIC | Age: 80
End: 2025-08-08

## 2025-08-08 VITALS
HEART RATE: 76 BPM | SYSTOLIC BLOOD PRESSURE: 160 MMHG | DIASTOLIC BLOOD PRESSURE: 82 MMHG | RESPIRATION RATE: 18 BRPM | OXYGEN SATURATION: 97 % | TEMPERATURE: 98 F

## 2025-08-08 PROCEDURE — 99239 HOSP IP/OBS DSCHRG MGMT >30: CPT

## 2025-08-08 RX ORDER — ATORVASTATIN CALCIUM 80 MG/1
1 TABLET, FILM COATED ORAL
Qty: 0 | Refills: 0 | DISCHARGE
Start: 2025-08-08

## 2025-08-08 RX ORDER — AMLODIPINE BESYLATE 10 MG/1
1 TABLET ORAL
Qty: 0 | Refills: 0 | DISCHARGE
Start: 2025-08-08

## 2025-08-08 RX ORDER — LOSARTAN POTASSIUM 100 MG/1
1 TABLET, FILM COATED ORAL
Refills: 0 | DISCHARGE

## 2025-08-08 RX ORDER — KETOCONAZOLE 20 MG/G
1 CREAM TOPICAL
Refills: 0 | DISCHARGE

## 2025-08-08 RX ORDER — COLCHICINE 0.6 MG/1
1 TABLET, FILM COATED ORAL
Refills: 0 | DISCHARGE

## 2025-08-08 RX ORDER — METOPROLOL SUCCINATE 50 MG/1
1 TABLET, EXTENDED RELEASE ORAL
Refills: 0 | DISCHARGE

## 2025-08-08 RX ORDER — HYDROCORTISONE 10 MG/G
1 CREAM TOPICAL
Refills: 0 | DISCHARGE

## 2025-08-08 RX ORDER — CEPHRADINE 500 MG
1 CAPSULE ORAL
Refills: 0 | DISCHARGE

## 2025-08-08 RX ORDER — ATORVASTATIN CALCIUM 80 MG/1
1 TABLET, FILM COATED ORAL
Refills: 0 | DISCHARGE

## 2025-08-08 RX ADMIN — Medication 1300 MILLIGRAM(S): at 09:11

## 2025-08-08 RX ADMIN — AMLODIPINE BESYLATE 10 MILLIGRAM(S): 10 TABLET ORAL at 09:12

## 2025-08-08 RX ADMIN — Medication 81 MILLIGRAM(S): at 09:12

## 2025-08-08 RX ADMIN — Medication 100 MILLIGRAM(S): at 09:12

## 2025-08-08 RX ADMIN — EZETIMIBE 10 MILLIGRAM(S): 10 TABLET ORAL at 09:12

## 2025-08-09 ENCOUNTER — TRANSCRIPTION ENCOUNTER (OUTPATIENT)
Age: 80
End: 2025-08-09

## 2025-08-11 ENCOUNTER — APPOINTMENT (OUTPATIENT)
Dept: FAMILY MEDICINE | Facility: CLINIC | Age: 80
End: 2025-08-11

## 2025-08-11 ENCOUNTER — APPOINTMENT (OUTPATIENT)
Dept: FAMILY MEDICINE | Facility: CLINIC | Age: 80
End: 2025-08-11
Payer: MEDICARE

## 2025-08-11 ENCOUNTER — TRANSCRIPTION ENCOUNTER (OUTPATIENT)
Age: 80
End: 2025-08-11

## 2025-08-11 VITALS
HEIGHT: 68 IN | OXYGEN SATURATION: 98 % | BODY MASS INDEX: 31.07 KG/M2 | WEIGHT: 205 LBS | HEART RATE: 68 BPM | SYSTOLIC BLOOD PRESSURE: 130 MMHG | DIASTOLIC BLOOD PRESSURE: 60 MMHG | TEMPERATURE: 97.8 F

## 2025-08-11 DIAGNOSIS — E66.01 OBESITY, CLASS 2: ICD-10-CM

## 2025-08-11 DIAGNOSIS — E66.812 OBESITY, CLASS 2: ICD-10-CM

## 2025-08-11 DIAGNOSIS — I47.20 VENTRICULAR TACHYCARDIA, UNSPECIFIED: ICD-10-CM

## 2025-08-11 DIAGNOSIS — R06.02 SHORTNESS OF BREATH: ICD-10-CM

## 2025-08-11 PROCEDURE — 99496 TRANSJ CARE MGMT HIGH F2F 7D: CPT

## 2025-08-13 ENCOUNTER — APPOINTMENT (OUTPATIENT)
Dept: INTERNAL MEDICINE | Facility: CLINIC | Age: 80
End: 2025-08-13
Payer: MEDICARE

## 2025-08-13 ENCOUNTER — APPOINTMENT (OUTPATIENT)
Dept: CARDIOLOGY | Facility: CLINIC | Age: 80
End: 2025-08-13
Payer: MEDICARE

## 2025-08-13 VITALS
HEIGHT: 68 IN | WEIGHT: 206 LBS | BODY MASS INDEX: 31.22 KG/M2 | DIASTOLIC BLOOD PRESSURE: 66 MMHG | OXYGEN SATURATION: 98 % | HEART RATE: 99 BPM | SYSTOLIC BLOOD PRESSURE: 140 MMHG

## 2025-08-13 VITALS
RESPIRATION RATE: 17 BRPM | BODY MASS INDEX: 39.71 KG/M2 | TEMPERATURE: 98 F | WEIGHT: 262 LBS | DIASTOLIC BLOOD PRESSURE: 58 MMHG | SYSTOLIC BLOOD PRESSURE: 148 MMHG | OXYGEN SATURATION: 96 % | HEART RATE: 90 BPM | HEIGHT: 68 IN

## 2025-08-13 DIAGNOSIS — I10 ESSENTIAL (PRIMARY) HYPERTENSION: ICD-10-CM

## 2025-08-13 DIAGNOSIS — S93.401A SPRAIN OF UNSPECIFIED LIGAMENT OF RIGHT ANKLE, INITIAL ENCOUNTER: ICD-10-CM

## 2025-08-13 DIAGNOSIS — Z87.898 PERSONAL HISTORY OF OTHER SPECIFIED CONDITIONS: ICD-10-CM

## 2025-08-13 DIAGNOSIS — G47.33 OBSTRUCTIVE SLEEP APNEA (ADULT) (PEDIATRIC): ICD-10-CM

## 2025-08-13 DIAGNOSIS — R00.1 BRADYCARDIA, UNSPECIFIED: ICD-10-CM

## 2025-08-13 DIAGNOSIS — Z85.51 PERSONAL HISTORY OF MALIGNANT NEOPLASM OF BLADDER: ICD-10-CM

## 2025-08-13 DIAGNOSIS — Z09 ENCOUNTER FOR FOLLOW-UP EXAMINATION AFTER COMPLETED TREATMENT FOR CONDITIONS OTHER THAN MALIGNANT NEOPLASM: ICD-10-CM

## 2025-08-13 DIAGNOSIS — R60.0 LOCALIZED EDEMA: ICD-10-CM

## 2025-08-13 DIAGNOSIS — Z13.29 ENCOUNTER FOR SCREENING FOR OTHER SUSPECTED ENDOCRINE DISORDER: ICD-10-CM

## 2025-08-13 DIAGNOSIS — N18.30 CHRONIC KIDNEY DISEASE, STAGE 3 UNSPECIFIED: ICD-10-CM

## 2025-08-13 DIAGNOSIS — Z86.19 PERSONAL HISTORY OF OTHER INFECTIOUS AND PARASITIC DISEASES: ICD-10-CM

## 2025-08-13 DIAGNOSIS — Z86.79 PERSONAL HISTORY OF OTHER DISEASES OF THE CIRCULATORY SYSTEM: ICD-10-CM

## 2025-08-13 DIAGNOSIS — Z87.39 PERSONAL HISTORY OF OTHER DISEASES OF THE MUSCULOSKELETAL SYSTEM AND CONNECTIVE TISSUE: ICD-10-CM

## 2025-08-13 PROCEDURE — 99215 OFFICE O/P EST HI 40 MIN: CPT

## 2025-08-13 PROCEDURE — 99214 OFFICE O/P EST MOD 30 MIN: CPT

## 2025-08-13 PROCEDURE — G2211 COMPLEX E/M VISIT ADD ON: CPT

## 2025-08-13 PROCEDURE — 93000 ELECTROCARDIOGRAM COMPLETE: CPT

## 2025-08-13 RX ORDER — AMLODIPINE BESYLATE 2.5 MG/1
2.5 TABLET ORAL
Refills: 0 | Status: ACTIVE | COMMUNITY

## 2025-08-13 RX ORDER — PREDNISONE 20 MG/1
20 TABLET ORAL DAILY
Qty: 4 | Refills: 0 | Status: DISCONTINUED | COMMUNITY
Start: 2025-08-05 | End: 2025-08-13

## 2025-08-13 RX ORDER — SACUBITRIL AND VALSARTAN 24; 26 MG/1; MG/1
24-26 TABLET, FILM COATED ORAL
Qty: 60 | Refills: 2 | Status: ACTIVE | COMMUNITY
Start: 2025-08-13 | End: 1900-01-01

## 2025-08-13 RX ORDER — TORSEMIDE 10 MG/1
10 TABLET ORAL
Qty: 30 | Refills: 0 | Status: ACTIVE | COMMUNITY
Start: 2025-08-13 | End: 1900-01-01

## 2025-08-18 ENCOUNTER — OUTPATIENT (OUTPATIENT)
Dept: OUTPATIENT SERVICES | Facility: HOSPITAL | Age: 80
LOS: 1 days | End: 2025-08-18
Payer: MEDICARE

## 2025-08-18 DIAGNOSIS — Z98.890 OTHER SPECIFIED POSTPROCEDURAL STATES: Chronic | ICD-10-CM

## 2025-08-18 DIAGNOSIS — G47.33 OBSTRUCTIVE SLEEP APNEA (ADULT) (PEDIATRIC): ICD-10-CM

## 2025-08-18 PROCEDURE — 95811 POLYSOM 6/>YRS CPAP 4/> PARM: CPT

## 2025-08-18 PROCEDURE — 95811 POLYSOM 6/>YRS CPAP 4/> PARM: CPT | Mod: 26

## 2025-08-20 ENCOUNTER — RX RENEWAL (OUTPATIENT)
Age: 80
End: 2025-08-20

## 2025-08-25 ENCOUNTER — TRANSCRIPTION ENCOUNTER (OUTPATIENT)
Age: 80
End: 2025-08-25

## 2025-08-25 ENCOUNTER — RX RENEWAL (OUTPATIENT)
Age: 80
End: 2025-08-25

## 2025-08-25 ENCOUNTER — NON-APPOINTMENT (OUTPATIENT)
Age: 80
End: 2025-08-25

## 2025-08-27 ENCOUNTER — APPOINTMENT (OUTPATIENT)
Dept: INTERNAL MEDICINE | Facility: CLINIC | Age: 80
End: 2025-08-27
Payer: MEDICARE

## 2025-08-27 ENCOUNTER — APPOINTMENT (OUTPATIENT)
Dept: INTERNAL MEDICINE | Facility: CLINIC | Age: 80
End: 2025-08-27

## 2025-08-27 DIAGNOSIS — G47.33 OBSTRUCTIVE SLEEP APNEA (ADULT) (PEDIATRIC): ICD-10-CM

## 2025-08-27 PROCEDURE — G2211 COMPLEX E/M VISIT ADD ON: CPT | Mod: 2W

## 2025-08-27 PROCEDURE — 99214 OFFICE O/P EST MOD 30 MIN: CPT | Mod: 2W

## 2025-08-27 RX ORDER — TIRZEPATIDE 7.5 MG/.5ML
7.5 INJECTION, SOLUTION SUBCUTANEOUS
Qty: 4 | Refills: 1 | Status: ACTIVE | COMMUNITY
Start: 2025-08-27 | End: 1900-01-01

## 2025-08-28 ENCOUNTER — APPOINTMENT (OUTPATIENT)
Dept: CARDIOLOGY | Facility: CLINIC | Age: 80
End: 2025-08-28

## 2025-08-29 ENCOUNTER — TRANSCRIPTION ENCOUNTER (OUTPATIENT)
Age: 80
End: 2025-08-29

## 2025-08-29 ENCOUNTER — APPOINTMENT (OUTPATIENT)
Dept: CARE COORDINATION | Facility: HOME HEALTH | Age: 80
End: 2025-08-29
Payer: MEDICARE

## 2025-08-29 VITALS
SYSTOLIC BLOOD PRESSURE: 138 MMHG | DIASTOLIC BLOOD PRESSURE: 65 MMHG | HEART RATE: 66 BPM | OXYGEN SATURATION: 98 % | RESPIRATION RATE: 17 BRPM

## 2025-08-29 DIAGNOSIS — I10 ESSENTIAL (PRIMARY) HYPERTENSION: ICD-10-CM

## 2025-08-29 DIAGNOSIS — N18.30 CHRONIC KIDNEY DISEASE, STAGE 3 UNSPECIFIED: ICD-10-CM

## 2025-08-29 DIAGNOSIS — M10.9 GOUT, UNSPECIFIED: ICD-10-CM

## 2025-08-29 DIAGNOSIS — I50.9 HEART FAILURE, UNSPECIFIED: ICD-10-CM

## 2025-08-29 PROCEDURE — 99349 HOME/RES VST EST MOD MDM 40: CPT

## 2025-08-30 PROBLEM — I50.9 CHF (CONGESTIVE HEART FAILURE): Status: ACTIVE | Noted: 2025-08-30

## 2025-08-30 RX ORDER — SODIUM BICARBONATE 650 MG/1
650 TABLET ORAL TWICE DAILY
Refills: 0 | Status: ACTIVE | COMMUNITY
Start: 2025-08-30

## 2025-09-04 ENCOUNTER — APPOINTMENT (OUTPATIENT)
Dept: ELECTROPHYSIOLOGY | Facility: CLINIC | Age: 80
End: 2025-09-04
Payer: MEDICARE

## 2025-09-04 ENCOUNTER — NON-APPOINTMENT (OUTPATIENT)
Age: 80
End: 2025-09-04

## 2025-09-04 PROCEDURE — 93298 REM INTERROG DEV EVAL SCRMS: CPT

## 2025-09-08 ENCOUNTER — RESULT REVIEW (OUTPATIENT)
Age: 80
End: 2025-09-08

## 2025-09-08 ENCOUNTER — LABORATORY RESULT (OUTPATIENT)
Age: 80
End: 2025-09-08

## 2025-09-08 ENCOUNTER — APPOINTMENT (OUTPATIENT)
Dept: HEMATOLOGY ONCOLOGY | Facility: CLINIC | Age: 80
End: 2025-09-08
Payer: MEDICARE

## 2025-09-08 VITALS
OXYGEN SATURATION: 95 % | SYSTOLIC BLOOD PRESSURE: 157 MMHG | WEIGHT: 266 LBS | TEMPERATURE: 98.3 F | DIASTOLIC BLOOD PRESSURE: 63 MMHG | HEART RATE: 93 BPM | BODY MASS INDEX: 40.45 KG/M2

## 2025-09-08 DIAGNOSIS — Z86.19 PERSONAL HISTORY OF OTHER INFECTIOUS AND PARASITIC DISEASES: ICD-10-CM

## 2025-09-08 DIAGNOSIS — R06.02 SHORTNESS OF BREATH: ICD-10-CM

## 2025-09-08 DIAGNOSIS — Z87.440 PERSONAL HISTORY OF URINARY (TRACT) INFECTIONS: ICD-10-CM

## 2025-09-08 DIAGNOSIS — M75.101 UNSPECIFIED ROTATOR CUFF TEAR OR RUPTURE OF RIGHT SHOULDER, NOT SPECIFIED AS TRAUMATIC: ICD-10-CM

## 2025-09-08 DIAGNOSIS — Z87.891 PERSONAL HISTORY OF NICOTINE DEPENDENCE: ICD-10-CM

## 2025-09-08 DIAGNOSIS — R79.89 OTHER SPECIFIED ABNORMAL FINDINGS OF BLOOD CHEMISTRY: ICD-10-CM

## 2025-09-08 DIAGNOSIS — D64.9 ANEMIA, UNSPECIFIED: ICD-10-CM

## 2025-09-08 PROCEDURE — 99204 OFFICE O/P NEW MOD 45 MIN: CPT

## 2025-09-09 LAB
ERYTHROCYTE [SEDIMENTATION RATE] IN BLOOD BY WESTERGREN METHOD: 35 MM/HR
FERRITIN SERPL-MCNC: 188 NG/ML
HAPTOGLOB SERPL-MCNC: 196 MG/DL
IRON SATN MFR SERPL: 20 %
IRON SERPL-MCNC: 61 UG/DL
RBC # BLD: 3.53 M/UL
RETICS # AUTO: 1.3 %
RETICS AGGREG/RBC NFR: 46.9 K/UL
TIBC SERPL-MCNC: 301 UG/DL
UIBC SERPL-MCNC: 241 UG/DL

## 2025-09-10 ENCOUNTER — NON-APPOINTMENT (OUTPATIENT)
Age: 80
End: 2025-09-10

## 2025-09-10 LAB — EPO SERPL-MCNC: 6.4 MIU/ML

## 2025-09-15 ENCOUNTER — APPOINTMENT (OUTPATIENT)
Dept: NEPHROLOGY | Facility: CLINIC | Age: 80
End: 2025-09-15
Payer: MEDICARE

## 2025-09-15 VITALS
TEMPERATURE: 98 F | BODY MASS INDEX: 40.32 KG/M2 | SYSTOLIC BLOOD PRESSURE: 146 MMHG | DIASTOLIC BLOOD PRESSURE: 72 MMHG | WEIGHT: 266 LBS | HEIGHT: 68 IN | OXYGEN SATURATION: 97 % | HEART RATE: 83 BPM

## 2025-09-15 DIAGNOSIS — Z85.51 PERSONAL HISTORY OF MALIGNANT NEOPLASM OF BLADDER: ICD-10-CM

## 2025-09-15 DIAGNOSIS — G89.29 PAIN IN RIGHT SHOULDER: ICD-10-CM

## 2025-09-15 DIAGNOSIS — M25.511 PAIN IN RIGHT SHOULDER: ICD-10-CM

## 2025-09-15 PROCEDURE — G2211 COMPLEX E/M VISIT ADD ON: CPT

## 2025-09-15 PROCEDURE — 99203 OFFICE O/P NEW LOW 30 MIN: CPT

## 2025-09-15 RX ORDER — MULTIVITAMIN,THERAPEUTIC
TABLET ORAL
Refills: 0 | Status: ACTIVE | COMMUNITY

## 2025-09-17 ENCOUNTER — APPOINTMENT (OUTPATIENT)
Dept: PULMONOLOGY | Facility: CLINIC | Age: 80
End: 2025-09-17
Payer: MEDICARE

## 2025-09-17 ENCOUNTER — APPOINTMENT (OUTPATIENT)
Dept: CARDIOLOGY | Facility: CLINIC | Age: 80
End: 2025-09-17
Payer: MEDICARE

## 2025-09-17 VITALS
WEIGHT: 261 LBS | SYSTOLIC BLOOD PRESSURE: 130 MMHG | OXYGEN SATURATION: 97 % | BODY MASS INDEX: 39.56 KG/M2 | HEART RATE: 85 BPM | HEIGHT: 68 IN | DIASTOLIC BLOOD PRESSURE: 70 MMHG

## 2025-09-17 VITALS
SYSTOLIC BLOOD PRESSURE: 157 MMHG | WEIGHT: 263.25 LBS | DIASTOLIC BLOOD PRESSURE: 80 MMHG | HEART RATE: 81 BPM | BODY MASS INDEX: 41.32 KG/M2 | HEIGHT: 67 IN | OXYGEN SATURATION: 96 % | TEMPERATURE: 98 F

## 2025-09-17 DIAGNOSIS — R06.02 SHORTNESS OF BREATH: ICD-10-CM

## 2025-09-17 DIAGNOSIS — I34.0 NONRHEUMATIC MITRAL (VALVE) INSUFFICIENCY: ICD-10-CM

## 2025-09-17 DIAGNOSIS — E66.813 OBESITY, CLASS 3: ICD-10-CM

## 2025-09-17 DIAGNOSIS — I10 ESSENTIAL (PRIMARY) HYPERTENSION: ICD-10-CM

## 2025-09-17 DIAGNOSIS — R00.1 BRADYCARDIA, UNSPECIFIED: ICD-10-CM

## 2025-09-17 DIAGNOSIS — E66.09 OTHER OBESITY DUE TO EXCESS CALORIES: ICD-10-CM

## 2025-09-17 DIAGNOSIS — N18.30 CHRONIC KIDNEY DISEASE, STAGE 3 UNSPECIFIED: ICD-10-CM

## 2025-09-17 DIAGNOSIS — G47.33 OBSTRUCTIVE SLEEP APNEA (ADULT) (PEDIATRIC): ICD-10-CM

## 2025-09-17 DIAGNOSIS — R60.0 LOCALIZED EDEMA: ICD-10-CM

## 2025-09-17 PROCEDURE — 99214 OFFICE O/P EST MOD 30 MIN: CPT

## 2025-09-17 PROCEDURE — G2211 COMPLEX E/M VISIT ADD ON: CPT

## 2025-09-18 LAB
25(OH)D3 SERPL-MCNC: 43.7 NG/ML
ALBUMIN SERPL ELPH-MCNC: 4.2 G/DL
ALP BLD-CCNC: 87 U/L
ALT SERPL-CCNC: 23 U/L
ANION GAP SERPL CALC-SCNC: 15 MMOL/L
AST SERPL-CCNC: 23 U/L
BILIRUB SERPL-MCNC: 0.4 MG/DL
BUN SERPL-MCNC: 43 MG/DL
CALCIUM SERPL-MCNC: 9.6 MG/DL
CALCIUM SERPL-MCNC: 9.6 MG/DL
CHLORIDE SERPL-SCNC: 108 MMOL/L
CK SERPL-CCNC: 77 U/L
CO2 SERPL-SCNC: 19 MMOL/L
CREAT SERPL-MCNC: 1.63 MG/DL
CREAT SPEC-SCNC: 102 MG/DL
CREAT/PROT UR: 0.9 RATIO
EGFRCR SERPLBLD CKD-EPI 2021: 43 ML/MIN/1.73M2
GLUCOSE SERPL-MCNC: 95 MG/DL
MAGNESIUM SERPL-MCNC: 2.1 MG/DL
NT-PROBNP SERPL-MCNC: 603 PG/ML
PARATHYROID HORMONE INTACT: 57 PG/ML
POTASSIUM SERPL-SCNC: 5.4 MMOL/L
PROT SERPL-MCNC: 6.4 G/DL
PROT UR-MCNC: 94 MG/DL
SODIUM ?TM SUB UR QN: 74 MMOL/L
SODIUM SERPL-SCNC: 142 MMOL/L
TSH SERPL-ACNC: 3.19 UIU/ML
URATE SERPL-MCNC: 6.5 MG/DL